# Patient Record
Sex: MALE | Race: WHITE | Employment: OTHER | URBAN - METROPOLITAN AREA
[De-identification: names, ages, dates, MRNs, and addresses within clinical notes are randomized per-mention and may not be internally consistent; named-entity substitution may affect disease eponyms.]

---

## 2017-02-15 ENCOUNTER — ANESTHESIA EVENT (OUTPATIENT)
Dept: ENDOSCOPY | Age: 67
End: 2017-02-15
Payer: COMMERCIAL

## 2017-02-15 RX ORDER — SODIUM CHLORIDE, SODIUM LACTATE, POTASSIUM CHLORIDE, CALCIUM CHLORIDE 600; 310; 30; 20 MG/100ML; MG/100ML; MG/100ML; MG/100ML
100 INJECTION, SOLUTION INTRAVENOUS CONTINUOUS
Status: CANCELLED | OUTPATIENT
Start: 2017-02-15

## 2017-02-15 RX ORDER — SODIUM CHLORIDE 0.9 % (FLUSH) 0.9 %
5-10 SYRINGE (ML) INJECTION AS NEEDED
Status: CANCELLED | OUTPATIENT
Start: 2017-02-15

## 2017-02-16 ENCOUNTER — HOSPITAL ENCOUNTER (OUTPATIENT)
Age: 67
Setting detail: OUTPATIENT SURGERY
Discharge: HOME | End: 2017-02-16
Attending: INTERNAL MEDICINE | Admitting: INTERNAL MEDICINE
Payer: COMMERCIAL

## 2017-02-16 ENCOUNTER — ANESTHESIA (OUTPATIENT)
Dept: ENDOSCOPY | Age: 67
End: 2017-02-16
Payer: COMMERCIAL

## 2017-02-16 ENCOUNTER — SURGERY (OUTPATIENT)
Age: 67
End: 2017-02-16

## 2017-02-16 VITALS
HEART RATE: 68 BPM | WEIGHT: 155 LBS | HEIGHT: 67 IN | SYSTOLIC BLOOD PRESSURE: 140 MMHG | OXYGEN SATURATION: 96 % | RESPIRATION RATE: 14 BRPM | DIASTOLIC BLOOD PRESSURE: 82 MMHG | TEMPERATURE: 97.7 F | BODY MASS INDEX: 24.33 KG/M2

## 2017-02-16 LAB — GLUCOSE BLD STRIP.AUTO-MCNC: 190 MG/DL (ref 65–100)

## 2017-02-16 PROCEDURE — 74011250636 HC RX REV CODE- 250/636: Performed by: ANESTHESIOLOGY

## 2017-02-16 PROCEDURE — 74011000250 HC RX REV CODE- 250: Performed by: ANESTHESIOLOGY

## 2017-02-16 PROCEDURE — 77030014243 HC BND LIG VRCES BSC -D: Performed by: INTERNAL MEDICINE

## 2017-02-16 PROCEDURE — 76040000025: Performed by: INTERNAL MEDICINE

## 2017-02-16 PROCEDURE — 74011000250 HC RX REV CODE- 250

## 2017-02-16 PROCEDURE — 76060000031 HC ANESTHESIA FIRST 0.5 HR: Performed by: INTERNAL MEDICINE

## 2017-02-16 PROCEDURE — 74011250636 HC RX REV CODE- 250/636: Performed by: INTERNAL MEDICINE

## 2017-02-16 PROCEDURE — 82962 GLUCOSE BLOOD TEST: CPT

## 2017-02-16 PROCEDURE — 74011250636 HC RX REV CODE- 250/636

## 2017-02-16 RX ORDER — ONDANSETRON 2 MG/ML
4 INJECTION INTRAMUSCULAR; INTRAVENOUS ONCE
Status: COMPLETED | OUTPATIENT
Start: 2017-02-16 | End: 2017-02-16

## 2017-02-16 RX ORDER — HYDROMORPHONE HYDROCHLORIDE 2 MG/ML
0.5 INJECTION, SOLUTION INTRAMUSCULAR; INTRAVENOUS; SUBCUTANEOUS
Status: CANCELLED | OUTPATIENT
Start: 2017-02-16

## 2017-02-16 RX ORDER — LIDOCAINE HYDROCHLORIDE 20 MG/ML
INJECTION, SOLUTION EPIDURAL; INFILTRATION; INTRACAUDAL; PERINEURAL AS NEEDED
Status: DISCONTINUED | OUTPATIENT
Start: 2017-02-16 | End: 2017-02-16 | Stop reason: HOSPADM

## 2017-02-16 RX ORDER — PROPOFOL 10 MG/ML
INJECTION, EMULSION INTRAVENOUS AS NEEDED
Status: DISCONTINUED | OUTPATIENT
Start: 2017-02-16 | End: 2017-02-16 | Stop reason: HOSPADM

## 2017-02-16 RX ORDER — SODIUM CHLORIDE, SODIUM LACTATE, POTASSIUM CHLORIDE, CALCIUM CHLORIDE 600; 310; 30; 20 MG/100ML; MG/100ML; MG/100ML; MG/100ML
100 INJECTION, SOLUTION INTRAVENOUS CONTINUOUS
Status: DISCONTINUED | OUTPATIENT
Start: 2017-02-16 | End: 2017-02-16 | Stop reason: HOSPADM

## 2017-02-16 RX ORDER — PROPOFOL 10 MG/ML
INJECTION, EMULSION INTRAVENOUS
Status: DISCONTINUED | OUTPATIENT
Start: 2017-02-16 | End: 2017-02-16 | Stop reason: HOSPADM

## 2017-02-16 RX ADMIN — PROPOFOL 200 MCG/KG/MIN: 10 INJECTION, EMULSION INTRAVENOUS at 08:26

## 2017-02-16 RX ADMIN — SODIUM CHLORIDE 6.25 MG: 9 INJECTION INTRAMUSCULAR; INTRAVENOUS; SUBCUTANEOUS at 08:09

## 2017-02-16 RX ADMIN — PROPOFOL 70 MG: 10 INJECTION, EMULSION INTRAVENOUS at 08:26

## 2017-02-16 RX ADMIN — ONDANSETRON 4 MG: 2 INJECTION INTRAMUSCULAR; INTRAVENOUS at 08:54

## 2017-02-16 RX ADMIN — SODIUM CHLORIDE, SODIUM LACTATE, POTASSIUM CHLORIDE, AND CALCIUM CHLORIDE 100 ML/HR: 600; 310; 30; 20 INJECTION, SOLUTION INTRAVENOUS at 07:50

## 2017-02-16 RX ADMIN — LIDOCAINE HYDROCHLORIDE 40 MG: 20 INJECTION, SOLUTION EPIDURAL; INFILTRATION; INTRACAUDAL; PERINEURAL at 08:26

## 2017-02-16 NOTE — IP AVS SNAPSHOT
Malini Kong 
 
 
 [address] [facility] [address] [facility] 
[phone] Patient: Ji Adler 
MRN: [id number] You are allergic to the following Allergen Reactions Metformin Diarrhea Recent Documentation Height Weight BMI Smoking Status 1.702 m 70.3 kg 24.28 kg/m2 Former Smoker Emergency Contacts Name Discharge Info Relation Home Work Mobile [address] CAREGIVER [3] Spouse [3] [phone] About your hospitalization You were admitted on:  February 16, 2017 You last received care in the:  [facility] You were discharged on:  February 16, 2017 Unit phone number:  [phone] Why you were hospitalized Your primary diagnosis was:  Not on File Providers Seen During Your Hospitalizations Provider Role Specialty Primary office phone Sandra Saucedo MD Attending Provider Gastroenterology [phone] Your Primary Care Physician (PCP) Primary Care Physician Office Phone Office Fax Shannon Mosley [phone] Follow-up Information None Your Appointments Tuesday March 28, 2017  8:20 AM EDT  
SAME DAY with Indigo Saavedra MD  
[address] [facility] [address] [place] [id number]  
[phone] Current Discharge Medication List  
  
ASK your doctor about these medications Dose & Instructions Dispensing Information Comments Morning Noon Evening Bedtime  
 aspirin delayed-release 81 mg tablet Your next dose is: Today, Tomorrow Other:  _________ Dose:  81 mg Take 81 mg by mouth nightly. Refills:  0  
     
   
   
   
  
 atorvastatin 80 mg tablet Commonly known as:  LIPITOR Your next dose is: Today, Tomorrow Other:  _________  Dose:  40 mg  
 Take 40 mg by mouth nightly. Indications: HYPERCHOLESTEROLEMIA Refills:  0  
     
   
   
   
  
 ferrous sulfate 325 mg (65 mg iron) EC tablet Commonly known as:  IRON Your next dose is: Today, Tomorrow Other:  _________ Dose:  325 mg Take 1 Tab by mouth two (2) times daily (with meals). Quantity:  60 Tab Refills:  5  
     
   
   
   
  
 isosorbide mononitrate ER 30 mg tablet Commonly known as:  IMDUR Your next dose is: Today, Tomorrow Other:  _________ Dose:  30 mg Take 30 mg by mouth every morning. Refills:  0  
     
   
   
   
  
 LANTUS 100 unit/mL injection Generic drug:  insulin glargine Your next dose is: Today, Tomorrow Other:  _________ Dose:  25 Units 25 Units by SubCUTAneous route nightly. Refills:  0  
     
   
   
   
  
 metoprolol tartrate 25 mg tablet Commonly known as:  LOPRESSOR Your next dose is: Today, Tomorrow Other:  _________ Dose:  25 mg Take 25 mg by mouth two (2) times a day. Indications: HYPERTENSION Refills:  0 NovoLOG Flexpen 100 unit/mL Inpn Generic drug:  insulin aspart Your next dose is: Today, Tomorrow Other:  _________ Dose:  10 Units 10 Units by SubCUTAneous route Daily (before lunch). SS- If 150-199= 1 unit 200-249= 3 units 250-300= 5 units  Indications: TYPE 2 DIABETES MELLITUS, At dinner 10 units Refills:  0 Omeprazole delayed release 20 mg tablet Commonly known as:  PRILOSEC D/R Your next dose is: Today, Tomorrow Other:  _________ Dose:  20 mg Take 20 mg by mouth two (2) times a day. Indications: GASTROESOPHAGEAL REFLUX Refills:  0  
     
   
   
   
  
 ondansetron 4 mg disintegrating tablet Commonly known as:  ZOFRAN ODT Your next dose is: Today, Tomorrow Other:  _________ Dose:  4 mg Take 1 Tab by mouth every eight (8) hours as needed for Nausea. Quantity:  20 Tab Refills:  0  
     
   
   
   
  
 tamsulosin 0.4 mg capsule Commonly known as:  FLOMAX Your next dose is: Today, Tomorrow Other:  _________ Dose:  0.4 mg Take 0.4 mg by mouth every morning. Refills:  0  
     
   
   
   
  
 triamcinolone acetonide 0.1 % ointment Commonly known as:  KENALOG Your next dose is: Today, Tomorrow Other:  _________ Apply  to affected area two (2) times a day. use thin layer Quantity:  30 g Refills:  0 Discharge Instructions Gastrointestinal Esophagogastroduodenoscopy (EGD) - Upper Exam Discharge Instructions 1. Call Dr. Alfonso Nunez for any problems or questions. 2. Contact the doctor's office for follow up appointment as directed. 3. Medication may cause drowsiness for several hours, therefore, do not drive or operate machinery for remainder of the day. 4. No alcohol today. 5. Ordinarily, you may resume regular diet and activity after exam unless otherwise specified by your physician. 6. For mild soreness in your throat you may use Cepacol throat lozenges or warm salt-water gargles as needed. Any additional instructions:  Liquids today; repeat EGD with banding in 6 weeks at hospital 
  
Instructions given to Leonidas and other family members. Instructions given by:  Koko Powell RN Discharge Orders None Introducing [facility]! Dear Brayden Selby: Thank you for requesting a [employer] account. Our records indicate that you already have an active [employer] account. You can access your account anytime at [email or web address] Did you know that you can access your hospital and ER discharge instructions at any time in [employer]? You can also review all of your test results from your hospital stay or ER visit. Additional Information If you have questions, please visit the Frequently Asked Questions section of the [employer]hart website at [email or web address] Remember, MyChart is NOT to be used for urgent needs. For medical emergencies, dial 911. Now available from your iPhone and Android! General Information Please provide this summary of care documentation to your next provider. Patient Signature:  ____________________________________________________________ Date:  ____________________________________________________________  
  
[facility] Provider Signature:  ____________________________________________________________ Date:  ____________________________________________________________

## 2017-02-16 NOTE — ROUTINE PROCESS
Patient discharged via wheelchair. VSS on room air. No complaints of pain/discomfort. Tolerating PO fluids. Spoke with Dr. Anam Bowden at bedside. Discharge instructions given to responsible party, signed copy placed in chart. Escorted to transportation by [employer].

## 2017-02-16 NOTE — H&P
History and Physical      Patient: Joey Lopez II    Physician: Zayra Richardson MD    Referring Physician: No ref. provider found    Chief Complaint: For EGD    History of Present Illness: Pt presents for EGD with possible banding- hx of varices from cirrhosis.      History:  Past Medical History   Diagnosis Date    Adverse effect of anesthesia      delayed awakening-pt denies    Biliary cirrhosis ([address] 8/8/2016    BPH (benign prostatic hyperplasia)     CAD (coronary artery disease) 6/6/16     MI--stent x 1- 1999---- followed by  [facility]--pt is SOB presently due to HGB 7.9; no cath or exac of CAD since 1999    Chronic back pain     Cirrhosis ([address] Dx 7/26/16 6/22/16 CBC and CMP--denies use of alcohol- banding of esophogeal varicies 5/2016- Dr Lesli Woods    Diabetes mellitus type 2, controlled ([address] dx 2007     type2-- avg 200 -- insulin dep since 2014- ([facility] \"They want me to check at least once a week\")- last A1C= 7.4 (6/22/16)-- no hypo    Esophageal varices ([address] 6/6/16     exac 5/23/16- disch 5/26/16- HGB 7.6 on 6/1/16    GERD (gastroesophageal reflux disease)      controlled with med    Hypercholesterolemia     Hyperlipidemia     Hypertension      controlled with med    Iron deficiency anemia      Esophogeal banding    Malignant hyperthermia due to anesthesia      1st cousin- on mom's side--- 30 yrs ago--per pt-- no one was tested in his family-REPORT TO DR Beth Lozano ORDERS    KAREN on CPAP      CPAP used    Varices, esophageal ([address]      banded 5/2016 Dr Lesli Woods     Past Surgical History   Procedure Laterality Date    Hx other surgical Left      gangrene left leg-- [facility]    Pr cardiac surg procedure unlist       stent x1    Hx lap cholecystectomy  ~2010    Hx cervical fusion       ACDF multilevel    Hx lumbar laminectomy      Pr breast surgery procedure unlisted  age 14     muscles removed in chest age 14     Hx shoulder arthroscopy Left      left shoulder    Hx orthopaedic       right ankle screw    Hx heent       esophageal banding    Hx tonsillectomy  as child    Hx heent Left      leg-gangrene in [place]-  I&D      Social History     Social History    Marital status:      Spouse name: N/A    Number of children: N/A    Years of education: N/A     Social History Main Topics    Smoking status: Former Smoker     Packs/day: 1.00     Years: 40.00     Quit date: 11/25/1997    Smokeless tobacco: Never Used    Alcohol use No      Comment: very rarely - 3x per year/ no hx heavy drinking    Drug use: No    Sexual activity: Not Asked     Other Topics Concern    None     Social History Narrative    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 Has 2 sons, 7 grandchildren      Family History   Problem Relation Age of Onset    Other Mother      liver disease secondary to hepatitis- from transfusion    Diabetes Mother     Liver Disease Mother     Heart Disease Father      pace maker    Diabetes Sister     Stroke Sister     No Known Problems Sister     No Known Problems Brother     No Known Problems Brother     No Known Problems Sister     Coronary Artery Disease Other      fam hx       Medications:   Prior to Admission medications    Medication Sig Start Date End Date Taking? Authorizing Provider   ferrous sulfate (IRON) 325 mg (65 mg iron) EC tablet Take 1 Tab by mouth two (2) times daily (with meals). Patient taking differently: Take 325 mg by mouth two (2) times daily (with meals). Stopped 2/7/17 10/31/16  Yes Kiet Vasquez MD   insulin glargine (LANTUS) 100 unit/mL injection 25 Units by SubCUTAneous route nightly. Yes Historical Provider   isosorbide mononitrate ER (IMDUR) 30 mg tablet Take 30 mg by mouth every morning. Yes Historical Provider   metoprolol (LOPRESSOR) 25 mg tablet Take 25 mg by mouth two (2) times a day.  Indications: HYPERTENSION   Yes Historical Provider insulin aspart (NOVOLOG FLEXPEN) 100 unit/mL flexpen 10 Units by SubCUTAneous route Daily (before lunch). SS-  If 150-199= 1 unit  200-249= 3 units  250-300= 5 units  Indications: TYPE 2 DIABETES MELLITUS, At dinner 10 units   Yes Historical Provider   atorvastatin (LIPITOR) 80 mg tablet Take 40 mg by mouth nightly. Indications: HYPERCHOLESTEROLEMIA   Yes Historical Provider   Omeprazole delayed release (PRILOSEC D/R) 20 mg tablet Take 20 mg by mouth two (2) times a day. Indications: GASTROESOPHAGEAL REFLUX   Yes Historical Provider   aspirin delayed-release 81 mg tablet Take 81 mg by mouth nightly. Yes Historical Provider   tamsulosin (FLOMAX) 0.4 mg capsule Take 0.4 mg by mouth every morning. Yes Historical Provider   triamcinolone acetonide (KENALOG) 0.1 % ointment Apply  to affected area two (2) times a day. use thin layer  Patient taking differently: Apply  to affected area two (2) times a day. use thin  PRN 11/8/16   Keara Burnette MD   ondansetron (ZOFRAN ODT) 4 mg disintegrating tablet Take 1 Tab by mouth every eight (8) hours as needed for Nausea. 10/12/16   Nicci Gunter MD       Allergies: Allergies   Allergen Reactions    Metformin Diarrhea       Physical Exam:     Vital Signs:   Visit Vitals    /69    Pulse 75    Temp 98.1 °F (36.7 °C)    Resp 14    Ht 5' 7\" (1.702 m)    Wt 70.3 kg (155 lb)    SpO2 96%    BMI 24.28 kg/m2     . General: no distress      Heart: regular   Lungs: unlabored   Abdominal: soft   Neurological: Grossly normal        Findings/Diagnosis: Esophageal varices    Plan of Care/Planned Procedure: EGD with banding. Pt/designee agree to proceed.       Signed:  Alcira Little MD   2/16/2017

## 2017-02-16 NOTE — ANESTHESIA PREPROCEDURE EVALUATION
Anesthetic History     Malignant hyperthermia (cousin on maternal side)          Review of Systems / Medical History  Patient summary reviewed, nursing notes reviewed and pertinent labs reviewed    Pulmonary        Sleep apnea: CPAP           Neuro/Psych              Cardiovascular    Hypertension: well controlled          CAD and cardiac stents (1999)         GI/Hepatic/Renal     GERD      Liver disease    Comments: Esophageal varicies Endo/Other    Diabetes: well controlled, type 2    Anemia     Other Findings              Physical Exam    Airway  Mallampati: II      Mouth opening: Normal     Cardiovascular  Regular rate and rhythm,  S1 and S2 normal,  no murmur, click, rub, or gallop  Rhythm: regular  Rate: normal         Dental    Dentition: Full upper dentures     Pulmonary  Breath sounds clear to auscultation               Abdominal         Other Findings            Anesthetic Plan    ASA: 3  Anesthesia type: total IV anesthesia          Induction: Intravenous  Anesthetic plan and risks discussed with: Patient

## 2017-02-16 NOTE — PROCEDURES
DATE OF PROCEDURE: 2/16/17    REQUESTING PHYSICIAN: Dr Gillian Briceño, Dr Melissa Manuel: Esophagogastroduodenoscopy. ENDOSCOPIST: Sundar Ribeiro M.D. PREOPERATIVE DIAGNOSIS: Esophageal varices, Cirrhosis    POSTOPERATIVE DIAGNOSIS: Esophageal varices, Portal Hypertensive Gastropathy, GAVE    INSTRUMENTS: GIF H190    SEDATION: MAC    DESCRIPTION: After informed consent was obtained, the patient was taken to the endoscopy suite and placed in the left lateral decubitus position. Intravenous sedation was administered as above and posterior pharynx was anesthetized with local anesthetic spray. After adequate sedation had been achieved the endoscope was inserted over the tongue and through the posterior pharynx under direct visualization down the esophagus to the stomach and into the second portion of the duodenum. The endoscopic was withdrawn from that point, performing a careful survey of the mucosa. Retroflexion was performed in the gastric fundus. FINDINGS:  Esophagus: 2 columns of grade II/III distal varices, one with small red nely sign. 4 bands were placed successfully. Stomach: Portal hypertensive gastropathy. GAVE noted. Several areas of mildly oozing inflammatory lesions. Duodenum: Normal duodenal mucosa.      Estimated blood loss:  0 cc-minimal    IMPRESSION:   Esophageal varices  GAVE  Portal Hypertensive Gastropathy    PLAN:  - Liquids today  - EGD with banding in 6wks    P Emely Thorpe MD

## 2017-02-16 NOTE — PROGRESS NOTES
Pt complains of nausea. Dr. Helena Gleason called; 6.25 mg phenergan ordered for patient. 6.25 mg iv phenergan given.

## 2017-02-16 NOTE — ANESTHESIA POSTPROCEDURE EVALUATION
Post-Anesthesia Evaluation and Assessment    Patient: Bhumika Polanco MRN: [id number]  SSN: [id number]    YOB: 1950  Age: 78 y.o. Sex: male       Cardiovascular Function/Vital Signs  Visit Vitals    /82    Pulse 68    Temp 36.5 °C (97.7 °F)    Resp 14    Ht 5' 7\" (1.702 m)    Wt 70.3 kg (155 lb)    SpO2 96%    BMI 24.28 kg/m2       Patient is status post total IV anesthesia anesthesia for Procedure(s):  ESOPHAGOGASTRODUODENOSCOPY (EGD) 26  ENDOSCOPIC BANDING OR LIGATION. Nausea/Vomiting: None    Postoperative hydration reviewed and adequate. Pain:  Pain Scale 1: Numeric (0 - 10) (02/16/17 0907)  Pain Intensity 1: 0 (02/16/17 8904)   Managed    Neurological Status: At baseline    Mental Status and Level of Consciousness: Arousable    Pulmonary Status:   O2 Device: Room air (02/16/17 0907)   Adequate oxygenation and airway patent    Complications related to anesthesia: None    Post-anesthesia assessment completed.  No concerns    Signed By: Dianne Healy MD     February 16, 2017

## 2017-02-16 NOTE — DISCHARGE INSTRUCTIONS
Gastrointestinal Esophagogastroduodenoscopy (EGD) - Upper Exam Discharge Instructions    1. Call Dr. Netta Traylor for any problems or questions. 2. Contact the doctor's office for follow up appointment as directed. 3. Medication may cause drowsiness for several hours, therefore, do not drive or operate machinery for remainder of the day. 4. No alcohol today. 5. Ordinarily, you may resume regular diet and activity after exam unless otherwise specified by your physician. 6. For mild soreness in your throat you may use Cepacol throat lozenges or warm salt-water gargles as needed. Any additional instructions:  Liquids today; repeat EGD with banding in 6 weeks at hospital     Instructions given to Leonidas and other family members.   Instructions given by:  Cynthia San RN

## 2017-02-20 ENCOUNTER — HOSPITAL ENCOUNTER (EMERGENCY)
Age: 67
Discharge: HOME | End: 2017-02-20
Attending: EMERGENCY MEDICINE
Payer: COMMERCIAL

## 2017-02-20 VITALS
RESPIRATION RATE: 16 BRPM | TEMPERATURE: 98 F | BODY MASS INDEX: 24.91 KG/M2 | SYSTOLIC BLOOD PRESSURE: 103 MMHG | WEIGHT: 155 LBS | HEIGHT: 66 IN | HEART RATE: 62 BPM | DIASTOLIC BLOOD PRESSURE: 65 MMHG | OXYGEN SATURATION: 94 %

## 2017-02-20 DIAGNOSIS — K92.1 BLACK TARRY STOOLS: Primary | ICD-10-CM

## 2017-02-20 LAB
ALBUMIN SERPL BCP-MCNC: 3.6 G/DL (ref 3.2–4.6)
ALBUMIN/GLOB SERPL: 0.9 {RATIO} (ref 1.2–3.5)
ALP SERPL-CCNC: 79 U/L (ref 50–136)
ALT SERPL-CCNC: 33 U/L (ref 12–65)
ANION GAP BLD CALC-SCNC: 9 MMOL/L (ref 7–16)
AST SERPL W P-5'-P-CCNC: 32 U/L (ref 15–37)
BASOPHILS # BLD AUTO: 0 K/UL (ref 0–0.2)
BASOPHILS # BLD: 1 % (ref 0–2)
BILIRUB SERPL-MCNC: 0.5 MG/DL (ref 0.2–1.1)
BUN SERPL-MCNC: 9 MG/DL (ref 8–23)
CALCIUM SERPL-MCNC: 8.9 MG/DL (ref 8.3–10.4)
CHLORIDE SERPL-SCNC: 101 MMOL/L (ref 98–107)
CO2 SERPL-SCNC: 28 MMOL/L (ref 21–32)
CREAT SERPL-MCNC: 0.83 MG/DL (ref 0.8–1.5)
DIFFERENTIAL METHOD BLD: ABNORMAL
EOSINOPHIL # BLD: 0.1 K/UL (ref 0–0.8)
EOSINOPHIL NFR BLD: 1 % (ref 0.5–7.8)
ERYTHROCYTE [DISTWIDTH] IN BLOOD BY AUTOMATED COUNT: 14.3 % (ref 11.9–14.6)
GLOBULIN SER CALC-MCNC: 4 G/DL (ref 2.3–3.5)
GLUCOSE SERPL-MCNC: 186 MG/DL (ref 65–100)
HCT VFR BLD AUTO: 42.6 % (ref 41.1–50.3)
HGB BLD-MCNC: 14 G/DL (ref 13.6–17.2)
IMM GRANULOCYTES # BLD: 0 K/UL (ref 0–0.5)
IMM GRANULOCYTES NFR BLD AUTO: 0.2 % (ref 0–5)
INR PPP: 1.1 (ref 0.9–1.2)
LIPASE SERPL-CCNC: 171 U/L (ref 73–393)
LYMPHOCYTES # BLD AUTO: 13 % (ref 13–44)
LYMPHOCYTES # BLD: 0.7 K/UL (ref 0.5–4.6)
MCH RBC QN AUTO: 29.5 PG (ref 26.1–32.9)
MCHC RBC AUTO-ENTMCNC: 32.9 G/DL (ref 31.4–35)
MCV RBC AUTO: 89.9 FL (ref 79.6–97.8)
MONOCYTES # BLD: 0.7 K/UL (ref 0.1–1.3)
MONOCYTES NFR BLD AUTO: 13 % (ref 4–12)
NEUTS SEG # BLD: 3.6 K/UL (ref 1.7–8.2)
NEUTS SEG NFR BLD AUTO: 72 % (ref 43–78)
PLATELET # BLD AUTO: 99 K/UL (ref 150–450)
PMV BLD AUTO: 11.8 FL (ref 10.8–14.1)
POTASSIUM SERPL-SCNC: 4.2 MMOL/L (ref 3.5–5.1)
PROT SERPL-MCNC: 7.6 G/DL (ref 6.3–8.2)
PROTHROMBIN TIME: 12.3 SEC (ref 9.6–12)
RBC # BLD AUTO: 4.74 M/UL (ref 4.23–5.67)
SODIUM SERPL-SCNC: 138 MMOL/L (ref 136–145)
WBC # BLD AUTO: 5 K/UL (ref 4.3–11.1)

## 2017-02-20 PROCEDURE — 96374 THER/PROPH/DIAG INJ IV PUSH: CPT | Performed by: EMERGENCY MEDICINE

## 2017-02-20 PROCEDURE — 74011250636 HC RX REV CODE- 250/636: Performed by: EMERGENCY MEDICINE

## 2017-02-20 PROCEDURE — 85610 PROTHROMBIN TIME: CPT | Performed by: EMERGENCY MEDICINE

## 2017-02-20 PROCEDURE — 85025 COMPLETE CBC W/AUTO DIFF WBC: CPT | Performed by: EMERGENCY MEDICINE

## 2017-02-20 PROCEDURE — 80053 COMPREHEN METABOLIC PANEL: CPT | Performed by: EMERGENCY MEDICINE

## 2017-02-20 PROCEDURE — 83690 ASSAY OF LIPASE: CPT | Performed by: EMERGENCY MEDICINE

## 2017-02-20 PROCEDURE — 99283 EMERGENCY DEPT VISIT LOW MDM: CPT | Performed by: EMERGENCY MEDICINE

## 2017-02-20 PROCEDURE — 96375 TX/PRO/DX INJ NEW DRUG ADDON: CPT | Performed by: EMERGENCY MEDICINE

## 2017-02-20 PROCEDURE — 74011000250 HC RX REV CODE- 250: Performed by: EMERGENCY MEDICINE

## 2017-02-20 RX ORDER — ONDANSETRON 2 MG/ML
4 INJECTION INTRAMUSCULAR; INTRAVENOUS
Status: COMPLETED | OUTPATIENT
Start: 2017-02-20 | End: 2017-02-20

## 2017-02-20 RX ORDER — FAMOTIDINE 10 MG/ML
20 INJECTION INTRAVENOUS
Status: COMPLETED | OUTPATIENT
Start: 2017-02-20 | End: 2017-02-20

## 2017-02-20 RX ORDER — SODIUM CHLORIDE 0.9 % (FLUSH) 0.9 %
5-10 SYRINGE (ML) INJECTION EVERY 8 HOURS
Status: DISCONTINUED | OUTPATIENT
Start: 2017-02-20 | End: 2017-02-20 | Stop reason: HOSPADM

## 2017-02-20 RX ORDER — SODIUM CHLORIDE 0.9 % (FLUSH) 0.9 %
5-10 SYRINGE (ML) INJECTION AS NEEDED
Status: DISCONTINUED | OUTPATIENT
Start: 2017-02-20 | End: 2017-02-20 | Stop reason: HOSPADM

## 2017-02-20 RX ADMIN — ONDANSETRON 4 MG: 2 INJECTION INTRAMUSCULAR; INTRAVENOUS at 13:42

## 2017-02-20 RX ADMIN — FAMOTIDINE 20 MG: 10 INJECTION, SOLUTION INTRAVENOUS at 13:42

## 2017-02-20 NOTE — ED NOTES
Pt states he had banding done last Thursday and is experiencing blood in the stool. Complains of nausea but no vomiting.

## 2017-02-20 NOTE — ED NOTES
I have reviewed discharge instructions with the patient. The patient verbalized understanding. Pt denies any further needs, questions, or concerns at this time. No adverse reactions to any medications, treatments, or procedures noted. Pt ambulatory with steady gait out of department.

## 2017-02-20 NOTE — ED PROVIDER NOTES
HPI Comments: Patient had an esophageal varices banding procedure done 4 days ago, complains of intermittent nausea and black tarry stools this morning. Patient describes slight burning in his epigastric region, denies any change in medications. Patient is a 78 y.o. male presenting with melena. The history is provided by the patient and the spouse. Melena    The current episode started today. The problem occurs rarely. The problem has been unchanged. The pain is mild. The stool is described as soft. There was no prior successful therapy. There was no prior unsuccessful therapy. Associated symptoms include abdominal pain (mild epigastric burning), hemorrhoids and nausea. Pertinent negatives include no anorexia, no fever, no diarrhea, no hematemesis, no rectal pain, no vomiting, no hematuria, no chest pain, no headaches, no coughing, no difficulty breathing and no rash. There were no sick contacts.         Past Medical History:   Diagnosis Date    Adverse effect of anesthesia      delayed awakening-pt denies    Biliary cirrhosis ([facility] [address] 8/8/2016    BPH (benign prostatic hyperplasia)     CAD (coronary artery disease) 6/6/16     MI--stent x 1- 1999---- [facility] by  [facility]--pt is SOB presently due to HGB 7.9; no cath or exac of CAD since 1999    Chronic back pain     Cirrhosis ([facility] [address] Dx 7/26/16 6/22/16 CBC and CMP--denies use of alcohol- banding of esophogeal varicies 5/2016- Dr Augustus Golden    Diabetes mellitus type 2, controlled ([facility] [address] dx 2007     type2-- avg 200 -- insulin dep since 2014- ([facility] \"They want me to check at least once a week\")- last A1C= 7.4 (6/22/16)-- no hypo    Esophageal varices ([address] 6/6/16     exac 5/23/16- disch 5/26/16- HGB 7.6 on 6/1/16    GERD (gastroesophageal reflux disease)      controlled with med    Hypercholesterolemia     Hyperlipidemia     Hypertension      controlled with med    Iron deficiency anemia      Esophogeal banding    Malignant hyperthermia due to anesthesia      1st cousin- on mom's side--- 30 yrs ago--per pt-- no one was tested in his family-REPORT TO DR Serafin Palencia ORDERS    KAREN on CPAP      CPAP used    Varices, esophageal ([address]      banded 5/2016 Dr Andrea Rogel       Past Surgical History:   Procedure Laterality Date    Hx other surgical Left      gangrene left leg-- [facility] in [facility]    Pr cardiac surg procedure unlist       stent x1    Hx lap cholecystectomy  ~2010    Hx cervical fusion       ACDF multilevel    Hx lumbar laminectomy      Pr breast surgery procedure unlisted  age 14     muscles removed in chest age 14     Hx shoulder arthroscopy Left      left shoulder    Hx orthopaedic       right ankle screw    Hx heent       esophageal banding    Hx tonsillectomy  as child    Hx heent Left      leg-gangrene in [place]-  I&D         Family History:   Problem Relation Age of Onset    Other Mother      liver disease secondary to hepatitis- from transfusion    Diabetes Mother     Liver Disease Mother     Heart Disease Father      pace maker    Diabetes Sister     Stroke Sister     No Known Problems Sister     No Known Problems Brother     No Known Problems Brother     No Known Problems Sister     Coronary Artery Disease Other      fam hx       Social History     Social History    Marital status:      Spouse name: N/A    Number of children: N/A    Years of education: N/A     Occupational History    Not on file. Social History Main Topics    Smoking status: Former Smoker     Packs/day: 1.00     Years: 40.00     Quit date: 11/25/1997    Smokeless tobacco: Never Used    Alcohol use No      Comment: very rarely - 3x per year/ no hx heavy drinking    Drug use: No    Sexual activity: Not on file     Other Topics Concern    Not on file     Social History Narrative    Retired from working in [employer] , 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  Has 2 sons, 7 grandchildren         ALLERGIES: Metformin    Review of Systems   Constitutional: Negative for activity change, appetite change, chills and fever. Respiratory: Negative for cough. Cardiovascular: Negative for chest pain. Gastrointestinal: Positive for abdominal pain (mild epigastric burning), blood in stool, hemorrhoids, melena and nausea. Negative for anorexia, constipation, diarrhea, hematemesis, rectal pain and vomiting. Genitourinary: Negative for hematuria. Skin: Negative for rash. Neurological: Negative for headaches. All other systems reviewed and are negative. Vitals:    02/20/17 1042   BP: 118/73   Pulse: 70   Resp: 16   Temp: 98.3 °F (36.8 °C)   SpO2: 96%   Weight: 70.3 kg (155 lb)   Height: 5' 6\" (1.676 m)            Physical Exam   Constitutional: He is oriented to person, place, and time. He appears well-developed and well-nourished. No distress. HENT:   Head: Normocephalic and atraumatic. Right Ear: Tympanic membrane and external ear normal.   Left Ear: Tympanic membrane and external ear normal.   Mouth/Throat: Oropharynx is clear and moist.   Eyes: Conjunctivae and EOM are normal. Pupils are equal, round, and reactive to light. Neck: Normal range of motion. Neck supple. No tracheal deviation present. Cardiovascular: Normal rate, regular rhythm, normal heart sounds and intact distal pulses. Exam reveals no gallop and no friction rub. No murmur heard. Pulmonary/Chest: Effort normal and breath sounds normal. No respiratory distress. He has no wheezes. Abdominal: Soft. Bowel sounds are normal. He exhibits no shifting dullness, no distension, no pulsatile liver, no fluid wave, no abdominal bruit, no pulsatile midline mass and no mass. There is no hepatosplenomegaly. There is tenderness (mild) in the epigastric area. There is no rigidity, no rebound, no guarding, no CVA tenderness, no tenderness at McBurney's point and negative Fritz's sign. No hernia. Musculoskeletal: Normal range of motion.  He exhibits no edema. Lymphadenopathy:     He has no cervical adenopathy. Neurological: He is alert and oriented to person, place, and time. He has normal strength. He displays normal reflexes. No cranial nerve deficit or sensory deficit. Coordination normal.   Skin: Skin is warm and dry. No rash noted. He is not diaphoretic. No erythema. Psychiatric: He has a normal mood and affect. His speech is normal and behavior is normal. Cognition and memory are normal.   Nursing note and vitals reviewed. MDM  Number of Diagnoses or Management Options  Black tarry stools: new and requires workup     Amount and/or Complexity of Data Reviewed  Clinical lab tests: ordered and reviewed  Decide to obtain previous medical records or to obtain history from someone other than the patient: yes  Review and summarize past medical records: yes    Risk of Complications, Morbidity, and/or Mortality  Presenting problems: high  Diagnostic procedures: low  Management options: moderate    Patient Progress  Patient progress: improved    ED Course       Procedures    The patient was observed in the ED. Normal BUN/creatinine and patient restarted iron supplements 3 days ago. Results Reviewed:      Recent Results (from the past 24 hour(s))   CBC WITH AUTOMATED DIFF    Collection Time: 02/20/17 10:45 AM   Result Value Ref Range    WBC 5.0 4.3 - 11.1 K/uL    RBC 4.74 4.23 - 5.67 M/uL    HGB 14.0 13.6 - 17.2 g/dL    HCT 42.6 41.1 - 50.3 %    MCV 89.9 79.6 - 97.8 FL    MCH 29.5 26.1 - 32.9 PG    MCHC 32.9 31.4 - 35.0 g/dL    RDW 14.3 11.9 - 14.6 %    PLATELET 99 (L) 848 - 450 K/uL    MPV 11.8 10.8 - 14.1 FL    DF AUTOMATED      NEUTROPHILS 72 43 - 78 %    LYMPHOCYTES 13 13 - 44 %    MONOCYTES 13 (H) 4.0 - 12.0 %    EOSINOPHILS 1 0.5 - 7.8 %    BASOPHILS 1 0.0 - 2.0 %    IMMATURE GRANULOCYTES 0.2 0.0 - 5.0 %    ABS. NEUTROPHILS 3.6 1.7 - 8.2 K/UL    ABS. LYMPHOCYTES 0.7 0.5 - 4.6 K/UL    ABS. MONOCYTES 0.7 0.1 - 1.3 K/UL    ABS. EOSINOPHILS 0.1 0.0 - 0.8 K/UL    ABS. BASOPHILS 0.0 0.0 - 0.2 K/UL    ABS. IMM. GRANS. 0.0 0.0 - 0.5 K/UL   METABOLIC PANEL, COMPREHENSIVE    Collection Time: 02/20/17 10:45 AM   Result Value Ref Range    Sodium 138 136 - 145 mmol/L    Potassium 4.2 3.5 - 5.1 mmol/L    Chloride 101 98 - 107 mmol/L    CO2 28 21 - 32 mmol/L    Anion gap 9 7 - 16 mmol/L    Glucose 186 (H) 65 - 100 mg/dL    BUN 9 8 - 23 MG/DL    Creatinine 0.83 0.8 - 1.5 MG/DL    GFR est AA >60 >60 ml/min/1.73m2    GFR est non-AA >60 >60 ml/min/1.73m2    Calcium 8.9 8.3 - 10.4 MG/DL    Bilirubin, total 0.5 0.2 - 1.1 MG/DL    ALT (SGPT) 33 12 - 65 U/L    AST (SGOT) 32 15 - 37 U/L    Alk. phosphatase 79 50 - 136 U/L    Protein, total 7.6 6.3 - 8.2 g/dL    Albumin 3.6 3.2 - 4.6 g/dL    Globulin 4.0 (H) 2.3 - 3.5 g/dL    A-G Ratio 0.9 (L) 1.2 - 3.5     LIPASE    Collection Time: 02/20/17 10:45 AM   Result Value Ref Range    Lipase 171 73 - 393 U/L   PROTHROMBIN TIME + INR    Collection Time: 02/20/17  1:10 PM   Result Value Ref Range    Prothrombin time 12.3 (H) 9.6 - 12.0 sec    INR 1.1 0.9 - 1.2         I discussed the results of all labs, procedures, radiographs, and treatments with the patient and available family. Treatment plan is agreed upon and the patient is ready for discharge. All voiced understanding of the discharge plan and medication instructions or changes as appropriate. Questions about treatment in the ED were answered. All were encouraged to return should symptoms worsen or new problems develop.

## 2017-02-20 NOTE — DISCHARGE INSTRUCTIONS
Rectal Bleeding: Care Instructions  Your Care Instructions  Rectal bleeding in small amounts is common. You may see red spotting on toilet paper or drops of blood in the toilet. Rectal bleeding has many possible causes, from something as minor as hemorrhoids to something as serious as colon cancer. You may need more tests to find the cause of your bleeding. Follow-up care is a key part of your treatment and safety. Be sure to make and go to all appointments, and call your doctor if you are having problems. Its also a good idea to know your test results and keep a list of the medicines you take. How can you care for yourself at home? · Avoid aspirin and other nonsteroidal anti-inflammatory drugs (NSAIDs), such as ibuprofen (Advil, Motrin) and naproxen (Aleve). They can cause you to bleed more. Ask your doctor if you can take acetaminophen (Tylenol). Read and follow all instructions on the label. · Use a stool softener that contains bran or psyllium. You can save money by buying bran or psyllium (available in bulk at most health food stores) and sprinkling it on foods or stirring it into fruit juice. You can also use a product such as Metamucil or Citrucel. · Take your medicines exactly as directed. Call your doctor if you think you are having a problem with your medicine. When should you call for help? Call 911 anytime you think you may need emergency care. For example, call if:  · You passed out (lost consciousness). · You pass maroon or very bloody stools. · You vomit blood or what looks like coffee grounds. Call your doctor now or seek immediate medical care if:  · You have severe belly pain. · You have increased bleeding. · You are dizzy or lightheaded, or you feel like you may faint. · Your stools are black and tarlike. Watch closely for changes in your health, and be sure to contact your doctor if:  · You lose weight and do not know why. · You feel tired all the time.   · Your bleeding does not decrease after 2 days. Where can you learn more? Go to http://harinder-filippo.info/. Enter E969 in the search box to learn more about \"Rectal Bleeding: Care Instructions. \"  Current as of: August 9, 2016  Content Version: 11.1  © 0880-2046 [employer]. Care instructions adapted under license by [employer] (which disclaims liability or warranty for this information). If you have questions about a medical condition or this instruction, always ask your healthcare professional. [address] any warranty or liability for your use of this information. Abdominal Pain: Care Instructions  Your Care Instructions    Abdominal pain has many possible causes. Some aren't serious and get better on their own in a few days. Others need more testing and treatment. If your pain continues or gets worse, you need to be rechecked and may need more tests to find out what is wrong. You may need surgery to correct the problem. Don't ignore new symptoms, such as fever, nausea and vomiting, urination problems, pain that gets worse, and dizziness. These may be signs of a more serious problem. Your doctor may have recommended a follow-up visit in the next 8 to 12 hours. If you are not getting better, you may need more tests or treatment. The doctor has checked you carefully, but problems can develop later. If you notice any problems or new symptoms, get medical treatment right away. Follow-up care is a key part of your treatment and safety. Be sure to make and go to all appointments, and call your doctor if you are having problems. It's also a good idea to know your test results and keep a list of the medicines you take. How can you care for yourself at home? · Rest until you feel better. · To prevent dehydration, drink plenty of fluids, enough so that your urine is light yellow or clear like water.  Choose water and other caffeine-free clear liquids until you feel better. If you have kidney, heart, or liver disease and have to limit fluids, talk with your doctor before you increase the amount of fluids you drink. · If your stomach is upset, eat mild foods, such as rice, dry toast or crackers, bananas, and applesauce. Try eating several small meals instead of two or three large ones. · Wait until 48 hours after all symptoms have gone away before you have spicy foods, alcohol, and drinks that contain caffeine. · Do not eat foods that are high in fat. · Avoid anti-inflammatory medicines such as aspirin, ibuprofen (Advil, Motrin), and naproxen (Aleve). These can cause stomach upset. Talk to your doctor if you take daily aspirin for another health problem. When should you call for help? Call 911 anytime you think you may need emergency care. For example, call if:  · You passed out (lost consciousness). · You pass maroon or very bloody stools. · You vomit blood or what looks like coffee grounds. · You have new, severe belly pain. Call your doctor now or seek immediate medical care if:  · Your pain gets worse, especially if it becomes focused in one area of your belly. · You have a new or higher fever. · Your stools are black and look like tar, or they have streaks of blood. · You have unexpected vaginal bleeding. · You have symptoms of a urinary tract infection. These may include:  ¨ Pain when you urinate. ¨ Urinating more often than usual.  ¨ Blood in your urine. · You are dizzy or lightheaded, or you feel like you may faint. Watch closely for changes in your health, and be sure to contact your doctor if:  · You are not getting better after 1 day (24 hours). Where can you learn more? Go to http://harinder-filippo.info/. Enter I600 in the search box to learn more about \"Abdominal Pain: Care Instructions. \"  Current as of: May 27, 2016  Content Version: 11.1  © 1405-8542 [employer].  Care instructions adapted under license by [employer] (which disclaims liability or warranty for this information). If you have questions about a medical condition or this instruction, always ask your healthcare professional. [address] any warranty or liability for your use of this information.

## 2017-02-20 NOTE — ED TRIAGE NOTES
Pt states having a banding for esophageal varices on last Thursday and states that he has been passing some tarry stools the past couple of days.

## 2017-03-28 PROBLEM — I25.83 CORONARY ARTERY DISEASE DUE TO LIPID RICH PLAQUE: Status: ACTIVE | Noted: 2017-03-28

## 2017-03-28 PROBLEM — I25.10 CORONARY ARTERY DISEASE DUE TO LIPID RICH PLAQUE: Status: ACTIVE | Noted: 2017-03-28

## 2017-04-02 ENCOUNTER — ANESTHESIA EVENT (OUTPATIENT)
Dept: ENDOSCOPY | Age: 67
End: 2017-04-02
Payer: COMMERCIAL

## 2017-04-05 ENCOUNTER — SURGERY (OUTPATIENT)
Age: 67
End: 2017-04-05

## 2017-04-05 ENCOUNTER — HOSPITAL ENCOUNTER (OUTPATIENT)
Age: 67
Setting detail: OUTPATIENT SURGERY
Discharge: HOME | End: 2017-04-05
Attending: INTERNAL MEDICINE | Admitting: INTERNAL MEDICINE
Payer: COMMERCIAL

## 2017-04-05 ENCOUNTER — ANESTHESIA (OUTPATIENT)
Dept: ENDOSCOPY | Age: 67
End: 2017-04-05
Payer: COMMERCIAL

## 2017-04-05 VITALS
WEIGHT: 155 LBS | RESPIRATION RATE: 18 BRPM | BODY MASS INDEX: 24.91 KG/M2 | SYSTOLIC BLOOD PRESSURE: 132 MMHG | TEMPERATURE: 97.9 F | OXYGEN SATURATION: 98 % | HEART RATE: 69 BPM | DIASTOLIC BLOOD PRESSURE: 84 MMHG | HEIGHT: 66 IN

## 2017-04-05 LAB — GLUCOSE BLD STRIP.AUTO-MCNC: 154 MG/DL (ref 65–100)

## 2017-04-05 PROCEDURE — 88312 SPECIAL STAINS GROUP 1: CPT | Performed by: INTERNAL MEDICINE

## 2017-04-05 PROCEDURE — 77030029929: Performed by: INTERNAL MEDICINE

## 2017-04-05 PROCEDURE — 74011250636 HC RX REV CODE- 250/636: Performed by: INTERNAL MEDICINE

## 2017-04-05 PROCEDURE — 88305 TISSUE EXAM BY PATHOLOGIST: CPT | Performed by: INTERNAL MEDICINE

## 2017-04-05 PROCEDURE — 74011250636 HC RX REV CODE- 250/636

## 2017-04-05 PROCEDURE — 76040000025: Performed by: INTERNAL MEDICINE

## 2017-04-05 PROCEDURE — 77030011640 HC PAD GRND REM COVD -A: Performed by: INTERNAL MEDICINE

## 2017-04-05 PROCEDURE — 77030013991 HC SNR POLYP ENDOSC BSC -A: Performed by: INTERNAL MEDICINE

## 2017-04-05 PROCEDURE — 82962 GLUCOSE BLOOD TEST: CPT

## 2017-04-05 PROCEDURE — 76060000031 HC ANESTHESIA FIRST 0.5 HR: Performed by: INTERNAL MEDICINE

## 2017-04-05 RX ORDER — SODIUM CHLORIDE, SODIUM LACTATE, POTASSIUM CHLORIDE, CALCIUM CHLORIDE 600; 310; 30; 20 MG/100ML; MG/100ML; MG/100ML; MG/100ML
1000 INJECTION, SOLUTION INTRAVENOUS CONTINUOUS
Status: DISCONTINUED | OUTPATIENT
Start: 2017-04-05 | End: 2017-04-05 | Stop reason: HOSPADM

## 2017-04-05 RX ORDER — EPINEPHRINE 0.1 MG/ML
INJECTION INTRACARDIAC; INTRAVENOUS
Status: COMPLETED
Start: 2017-04-05 | End: 2017-04-05

## 2017-04-05 RX ORDER — EPINEPHRINE 0.1 MG/ML
1 INJECTION INTRACARDIAC; INTRAVENOUS ONCE
Status: DISCONTINUED | OUTPATIENT
Start: 2017-04-05 | End: 2017-04-05 | Stop reason: HOSPADM

## 2017-04-05 RX ORDER — PROPOFOL 10 MG/ML
INJECTION, EMULSION INTRAVENOUS AS NEEDED
Status: DISCONTINUED | OUTPATIENT
Start: 2017-04-05 | End: 2017-04-05 | Stop reason: HOSPADM

## 2017-04-05 RX ADMIN — PROPOFOL 50 MG: 10 INJECTION, EMULSION INTRAVENOUS at 09:12

## 2017-04-05 RX ADMIN — PROPOFOL 50 MG: 10 INJECTION, EMULSION INTRAVENOUS at 09:20

## 2017-04-05 RX ADMIN — EPINEPHRINE 1 MG: 0.1 INJECTION, SOLUTION ENDOTRACHEAL; INTRACARDIAC; INTRAVENOUS at 09:24

## 2017-04-05 RX ADMIN — PROPOFOL 50 MG: 10 INJECTION, EMULSION INTRAVENOUS at 09:10

## 2017-04-05 RX ADMIN — SODIUM CHLORIDE, SODIUM LACTATE, POTASSIUM CHLORIDE, AND CALCIUM CHLORIDE 1000 ML: 600; 310; 30; 20 INJECTION, SOLUTION INTRAVENOUS at 07:58

## 2017-04-05 NOTE — DISCHARGE INSTRUCTIONS
Gastrointestinal Esophagogastroduodenoscopy (EGD) - Upper Exam Discharge Instructions    1. Call Dr. Haylee Guzmán for any problems or questions. 2. Contact the doctor's office for follow up appointment as directed. 3. Medication may cause drowsiness for several hours, therefore, do not drive or  operate machinery for remainder of the day. 4. No alcohol today. 5. Ordinarily, you may resume regular diet and activity after exam unless otherwise specified by your physician. 6. For mild soreness in your throat you may use Cepacol throat lozenges or warm salt-water gargles as needed. Any additional instructions:  Plan to repeat EGD in 6 months. Office will notify you. No ibuprofen, aleive, motrin or anything that says NSAIDS on the bottle for at least 2 weeks. New prescription for Nadalol. Instructions given to Orlando Johnson and wife.   Instructions given by Hunter Dumont

## 2017-04-05 NOTE — ANESTHESIA PREPROCEDURE EVALUATION
Anesthetic History     Malignant hyperthermia (Family History)          Review of Systems / Medical History  Patient summary reviewed    Pulmonary        Sleep apnea: CPAP           Neuro/Psych              Cardiovascular  Within defined limits  Hypertension          CAD and cardiac stents (1999)  Pertinent negatives: No past MI and angina       GI/Hepatic/Renal     GERD      Liver disease (cirrhosis)     Endo/Other    Diabetes: type 1         Other Findings              Physical Exam    Airway  Mallampati: II  TM Distance: > 6 cm  Neck ROM: normal range of motion   Mouth opening: Normal     Cardiovascular  Regular rate and rhythm,  S1 and S2 normal,  no murmur, click, rub, or gallop             Dental  No notable dental hx       Pulmonary  Breath sounds clear to auscultation               Abdominal         Other Findings            Anesthetic Plan    ASA: 3  Anesthesia type: total IV anesthesia            Anesthetic plan and risks discussed with: Patient

## 2017-04-05 NOTE — ANESTHESIA POSTPROCEDURE EVALUATION
Post-Anesthesia Evaluation and Assessment    Patient: Flori Deluca MRN: [id number]  SSN: [id number]    YOB: 1950  Age: 78 y.o. Sex: male       Cardiovascular Function/Vital Signs  Visit Vitals    /83    Pulse 69    Temp 36.6 °C (97.9 °F)    Resp 18    Ht 5' 6\" (1.676 m)    Wt 70.3 kg (155 lb)    SpO2 96%    BMI 25.02 kg/m2       Patient is status post total IV anesthesia anesthesia for Procedure(s):  ESOPHAGOGASTRODUODENOSCOPY (EGD) 26/ **FAMILY HX - YUMIKO OKD**  ENDOSCOPIC POLYPECTOMY  INJECTION. Nausea/Vomiting: None    Postoperative hydration reviewed and adequate. Pain:  Pain Scale 1: Numeric (0 - 10) (04/05/17 0951)  Pain Intensity 1: 0 (04/05/17 0951)   Managed    Neurological Status: At baseline    Mental Status and Level of Consciousness: Arousable    Pulmonary Status:   O2 Device: Room air (04/05/17 0951)   Adequate oxygenation and airway patent    Complications related to anesthesia: None    Post-anesthesia assessment completed.  No concerns    Signed By: Junior Cornell MD     April 5, 2017

## 2017-04-05 NOTE — IP AVS SNAPSHOT
[address] 
[phone] Patient: Paula Nurse 
MRN: [id number] You are allergic to the following Allergen Reactions Metformin Diarrhea Recent Documentation Height Weight BMI Smoking Status 1.676 m 70.3 kg 25.02 kg/m2 Former Smoker Emergency Contacts Name Discharge Info Relation Home Work Mobile [address] CAREGIVER [3] Spouse [3] [phone] About your hospitalization You were admitted on:  April 5, 2017 You last received care in the:  [facility] You were discharged on:  April 5, 2017 Unit phone number:  [phone] Why you were hospitalized Your primary diagnosis was:  Not on File Providers Seen During Your Hospitalizations Provider Role Specialty Primary office phone Diya Head MD Attending Provider Gastroenterology [phone] Your Primary Care Physician (PCP) Primary Care Physician Office Phone Office Fax Pamula Schlatter [phone] Follow-up Information None Your Appointments Tuesday May 09, 2017  9:00 AM EDT  
LAB with [address]  
[address] [facility] OUTREACH INSURANCE ([address]) [place] [address]  
[phone] Tuesday May 16, 2017  9:00 AM EDT Follow Up with Cassy Vinson MD  
[facility] Hematology and Oncology [facility]) JANIYA/ Rick Cox [address] [place] [id number]  
[phone] Current Discharge Medication List  
  
ASK your doctor about these medications Dose & Instructions Dispensing Information Comments Morning Noon Evening Bedtime  
 aspirin delayed-release 81 mg tablet Your last dose was: Your next dose is:    
   
   
 Dose:  81 mg Take 81 mg by mouth nightly. Refills:  0 atorvastatin 80 mg tablet Commonly known as:  LIPITOR Your last dose was: Your next dose is:    
   
   
 Dose:  40 mg Take 40 mg by mouth nightly. Indications: HYPERCHOLESTEROLEMIA Refills:  0  
     
   
   
   
  
 ferrous sulfate 325 mg (65 mg iron) EC tablet Commonly known as:  IRON Your last dose was: Your next dose is:    
   
   
 Dose:  325 mg Take 1 Tab by mouth two (2) times daily (with meals). Quantity:  60 Tab Refills:  11  
     
   
   
   
  
 isosorbide mononitrate ER 30 mg tablet Commonly known as:  IMDUR Your last dose was: Your next dose is:    
   
   
 Dose:  30 mg Take 30 mg by mouth every morning. Refills:  0  
     
   
   
   
  
 LANTUS 100 unit/mL injection Generic drug:  insulin glargine Your last dose was: Your next dose is:    
   
   
 Dose:  25 Units 25 Units by SubCUTAneous route nightly. Refills:  0  
     
   
   
   
  
 metoprolol tartrate 25 mg tablet Commonly known as:  LOPRESSOR Your last dose was: Your next dose is:    
   
   
 Dose:  25 mg Take 25 mg by mouth two (2) times a day. Indications: HYPERTENSION Refills:  0 NovoLOG Flexpen 100 unit/mL Inpn Generic drug:  insulin aspart Your last dose was: Your next dose is:    
   
   
 Dose:  10 Units 10 Units by SubCUTAneous route Daily (before lunch). SS- If 150-199= 1 unit 200-249= 3 units 250-300= 5 units  Indications: TYPE 2 DIABETES MELLITUS, At dinner 10 units Refills:  0 Omeprazole delayed release 20 mg tablet Commonly known as:  PRILOSEC D/R Your last dose was: Your next dose is:    
   
   
 Dose:  20 mg Take 20 mg by mouth two (2) times a day. Indications: GASTROESOPHAGEAL REFLUX Refills:  0  
     
   
   
   
  
 ondansetron 4 mg disintegrating tablet Commonly known as:  ZOFRAN ODT Your last dose was: Your next dose is:    
   
   
 Dose:  4 mg Take 1 Tab by mouth every eight (8) hours as needed for Nausea. Quantity:  20 Tab Refills:  0  
     
   
   
   
  
 tamsulosin 0.4 mg capsule Commonly known as:  FLOMAX Your last dose was: Your next dose is:    
   
   
 Dose:  0.4 mg Take 0.4 mg by mouth every morning. Refills:  0 Discharge Instructions Gastrointestinal Esophagogastroduodenoscopy (EGD) - Upper Exam Discharge Instructions 1. Call Dr. Mare Peguero for any problems or questions. 2. Contact the doctor's office for follow up appointment as directed. 3. Medication may cause drowsiness for several hours, therefore, do not drive or  operate machinery for remainder of the day. 4. No alcohol today. 5. Ordinarily, you may resume regular diet and activity after exam unless otherwise specified by your physician. 6. For mild soreness in your throat you may use Cepacol throat lozenges or warm salt-water gargles as needed. Any additional instructions:  Plan to repeat EGD in 6 months. Office will notify you. No ibuprofen, aleive, motrin or anything that says NSAIDS on the bottle for at least 2 weeks. New prescription for Nadalol. Instructions given to Kat Zamorano and wife. Instructions given by Lonzie People Discharge Orders None Introducing [facility]! Dear Warren Christopher: Thank you for requesting a [employer] account. Our records indicate that you already have an active [employer] account. You can access your account anytime at [email or web address] "[email or web address] Did you know that you can access your hospital and ER discharge instructions at any time in [employer]? You can also review all of your test results from your hospital stay or ER visit. Additional Information If you have questions, please visit the Frequently Asked Questions section of the [employer] website at [email or web address] "[email or web address]/. Remember, MyChart is NOT to be used for urgent needs. For medical emergencies, dial 911. Now available from your iPhone and Android! General Information Please provide this summary of care documentation to your next provider. Patient Signature:  ____________________________________________________________ Date:  ____________________________________________________________  
  
Gatha Cyphers Provider Signature:  ____________________________________________________________ Date:  ____________________________________________________________

## 2017-04-05 NOTE — PROGRESS NOTES
Patient passing flatus, tolerating po fluids well. Patient escorted to car via wheelchair  by Mariel Oh  for discharge home with wife. Discharge instructions reviewed. Dr. Orlin Rodríguez spoke with pt and family.

## 2017-04-05 NOTE — H&P
Gastroenterology Outpatient History and Physical    Patient: Jose Guadalupela Darian VÁSQUEZ    Physician: Akash Red MD    Vital Signs: Blood pressure 103/71, pulse 68, temperature 97.9 °F (36.6 °C), resp. rate 18, height 5' 6\" (1.676 m), weight 70.3 kg (155 lb), SpO2 95 %. Allergies:    Allergies   Allergen Reactions    Metformin Diarrhea       Chief Complaint: Esoph varices    History of Present Illness: Cirrhosis    Justification for Procedure: As above    History:  Past Medical History:   Diagnosis Date    Biliary cirrhosis ([address] 8/8/2016    BPH (benign prostatic hyperplasia)     CAD (coronary artery disease) 06/06/2016    MI--stent x 1- 1999---- [facility]    Chronic back pain     Cirrhosis ([facility] Dx 7/26/16 6/22/16 CBC and CMP--denies use of alcohol- banding of esophogeal varicies 5/2016- Dr Patsy Grullon    Diabetes mellitus type 2, controlled ([facility] dx 2007    type2-- avg 200 ------ no hypo    GERD (gastroesophageal reflux disease)     controlled with med    Hypercholesterolemia     Hyperlipidemia     Hypertension     controlled with med    Iron deficiency anemia     Esophogeal banding    Malignant hyperthermia due to anesthesia     1st cousin- on mom's side--- 30 yrs ago--per pt-- no one was tested in his family-REPORT TO DR Khadijah Isabel ORDERS    KAREN on CPAP     CPAP used    Varices, esophageal ([facility]     banded 5/2016 Dr Patsy Grullon      Past Surgical History:   Procedure Laterality Date    BREAST SURGERY PROCEDURE UNLISTED  age 14    muscles removed in chest age 14    [place]    stent x1    HX CERVICAL FUSION      ACDF multilevel    HX HEENT      esophageal banding    HX HEENT Left     [place] in [place]  I&D    HX LAP CHOLECYSTECTOMY  ~2010    HX LUMBAR LAMINECTOMY      HX ORTHOPAEDIC      right ankle screw    HX OTHER SURGICAL Left     gangrene left leg-- [facility] in [facility]    HX OTHER SURGICAL      egd    HX SHOULDER ARTHROSCOPY Left     left shoulder    HX TONSILLECTOMY  as child      Social History     Social History    Marital status:      Spouse name: N/A    Number of children: N/A    Years of education: N/A     Social History Main Topics    Smoking status: Former Smoker     Packs/day: 1.00     Years: 40.00     Quit date: 11/25/1997    Smokeless tobacco: Never Used    Alcohol use No      Comment: none x 1 yr-- only occ x 10 yrs    Drug use: No    Sexual activity: Not Asked     Other Topics Concern    None     Social History Narrative    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 Has 2 sons, 7 grandchildren      Family History   Problem Relation Age of Onset    Other Mother      liver disease secondary to hepatitis- from transfusion    Diabetes Mother     Liver Disease Mother     Heart Disease Father      pace maker    Diabetes Sister     Stroke Sister     No Known Problems Sister     No Known Problems Brother     No Known Problems Brother     No Known Problems Sister     Coronary Artery Disease Other      fam hx       Medications:   Prior to Admission medications    Medication Sig Start Date End Date Taking? Authorizing Provider   ferrous sulfate (IRON) 325 mg (65 mg iron) EC tablet Take 1 Tab by mouth two (2) times daily (with meals). 3/28/17  Yes Koby Bowser MD   insulin glargine (LANTUS) 100 unit/mL injection 25 Units by SubCUTAneous route nightly. Yes Historical Provider   isosorbide mononitrate ER (IMDUR) 30 mg tablet Take 30 mg by mouth every morning. Yes Historical Provider   metoprolol (LOPRESSOR) 25 mg tablet Take 25 mg by mouth two (2) times a day. Indications: HYPERTENSION   Yes Historical Provider   atorvastatin (LIPITOR) 80 mg tablet Take 40 mg by mouth nightly. Indications: HYPERCHOLESTEROLEMIA   Yes Historical Provider   Omeprazole delayed release (PRILOSEC D/R) 20 mg tablet Take 20 mg by mouth two (2) times a day.  Indications: GASTROESOPHAGEAL REFLUX   Yes Historical Provider   aspirin delayed-release 81 mg tablet Take 81 mg by mouth nightly. Yes Historical Provider   tamsulosin (FLOMAX) 0.4 mg capsule Take 0.4 mg by mouth every morning. Yes Historical Provider   ondansetron (ZOFRAN ODT) 4 mg disintegrating tablet Take 1 Tab by mouth every eight (8) hours as needed for Nausea. 10/12/16   Nan Turner MD   insulin aspart (NOVOLOG FLEXPEN) 100 unit/mL flexpen 10 Units by SubCUTAneous route Daily (before lunch).  SS-  If 150-199= 1 unit  200-249= 3 units  250-300= 5 units  Indications: TYPE 2 DIABETES MELLITUS, At dinner 10 units    Historical Provider       Physical Exam:   General: alert      Heart: regular rate and rhythm   Lungs: no tachypnea, retractions or cyanosis, Heart exam - S1, S2 normal, no murmur, no gallop, rate regular   Abdominal: Bowel sounds are normal, soft, non distended             Plan of Care/Planned Procedure: EGD    Signed:  Ascencion Romero MD 4/5/2017

## 2017-04-05 NOTE — PROCEDURES
EGD Procedure Note    PreOp Diagnosis:   Esophageal varices  Cirrhosis    PostOp Diagnosis:  Esophageal varices- no intervention  Gastric polyps  Portal hypertensive gastropathy    Medications:  Monitored Anesthesia    Procedure:  EGD   Instrument:   After informed consent was obtained, the patient was sedated and the endoscope was inserted  in the mouth and advanced into the duodenum without difficulty. The scope was slowly withdrawn while the mucosa was carefully inspected, including a retroflexed view of the cardia and fundus. Findings:   Esophagus: The proximal and mid esophagus appeared normal.  In the distal esophagus, There was extensive scarring from previous variceal banding. There were 3 columns of grade 1 esophageal varices. There were no bleeding stigmata. No intervention was required. Stomach: Moderate portal hypertensive gastropathy in the proximal stomach. Mild GAVE in the gastric antrum. At least 4 polyps R. 10 mm or greater and have mild erosions or ulcerations present. One polyp along the incisura has active oozing. These have a hyperplastic appearance, but with ulceration there is a risk of bleeding. The largest polyp with active bleeding was injected with 5 cc of epinephrine at the base. Polypectomy with hot snare. Duodenum:   Mild duodenitis    Recommendations:   Follow up pathology results  Avoid NSAIDs  Consider B-B  Rep EGD 6 months at hosp- Any MD  RV Dr. Albert Gosselin, MD

## 2017-05-09 ENCOUNTER — HOSPITAL ENCOUNTER (OUTPATIENT)
Dept: LAB | Age: 67
Discharge: HOME | End: 2017-05-09

## 2017-05-09 DIAGNOSIS — D50.0 IRON DEFICIENCY ANEMIA DUE TO CHRONIC BLOOD LOSS: Chronic | ICD-10-CM

## 2017-11-04 ENCOUNTER — HOSPITAL ENCOUNTER (EMERGENCY)
Age: 67
Discharge: HOME | End: 2017-11-04
Attending: EMERGENCY MEDICINE
Payer: COMMERCIAL

## 2017-11-04 ENCOUNTER — APPOINTMENT (OUTPATIENT)
Dept: CT IMAGING | Age: 67
End: 2017-11-04
Attending: EMERGENCY MEDICINE
Payer: COMMERCIAL

## 2017-11-04 VITALS
RESPIRATION RATE: 22 BRPM | WEIGHT: 161 LBS | BODY MASS INDEX: 25.88 KG/M2 | TEMPERATURE: 97.5 F | DIASTOLIC BLOOD PRESSURE: 78 MMHG | SYSTOLIC BLOOD PRESSURE: 128 MMHG | HEIGHT: 66 IN | OXYGEN SATURATION: 96 % | HEART RATE: 73 BPM

## 2017-11-04 DIAGNOSIS — K92.2 ACUTE LOWER GI BLEEDING: ICD-10-CM

## 2017-11-04 DIAGNOSIS — R42 DIZZINESS: Primary | ICD-10-CM

## 2017-11-04 LAB
ALBUMIN SERPL-MCNC: 3.5 G/DL (ref 3.2–4.6)
ALBUMIN/GLOB SERPL: 0.9 {RATIO} (ref 1.2–3.5)
ALP SERPL-CCNC: 89 U/L (ref 50–136)
ALT SERPL-CCNC: 32 U/L (ref 12–65)
ANION GAP SERPL CALC-SCNC: 7 MMOL/L (ref 7–16)
AST SERPL-CCNC: 28 U/L (ref 15–37)
ATRIAL RATE: 73 BPM
BASOPHILS # BLD: 0 K/UL (ref 0–0.2)
BASOPHILS NFR BLD: 1 % (ref 0–2)
BILIRUB SERPL-MCNC: 0.4 MG/DL (ref 0.2–1.1)
BUN SERPL-MCNC: 14 MG/DL (ref 8–23)
CALCIUM SERPL-MCNC: 9.2 MG/DL (ref 8.3–10.4)
CALCULATED P AXIS, ECG09: 16 DEGREES
CALCULATED R AXIS, ECG10: 6 DEGREES
CALCULATED T AXIS, ECG11: 12 DEGREES
CHLORIDE SERPL-SCNC: 105 MMOL/L (ref 98–107)
CO2 SERPL-SCNC: 28 MMOL/L (ref 21–32)
CREAT SERPL-MCNC: 0.75 MG/DL (ref 0.8–1.5)
DIAGNOSIS, 93000: NORMAL
DIFFERENTIAL METHOD BLD: ABNORMAL
EOSINOPHIL # BLD: 0.1 K/UL (ref 0–0.8)
EOSINOPHIL NFR BLD: 2 % (ref 0.5–7.8)
ERYTHROCYTE [DISTWIDTH] IN BLOOD BY AUTOMATED COUNT: 14.9 % (ref 11.9–14.6)
GLOBULIN SER CALC-MCNC: 4 G/DL (ref 2.3–3.5)
GLUCOSE SERPL-MCNC: 173 MG/DL (ref 65–100)
HCT VFR BLD AUTO: 39.8 % (ref 41.1–50.3)
HGB BLD-MCNC: 13.5 G/DL (ref 13.6–17.2)
IMM GRANULOCYTES # BLD: 0 K/UL (ref 0–0.5)
IMM GRANULOCYTES NFR BLD: 0 % (ref 0–5)
LIPASE SERPL-CCNC: 206 U/L (ref 73–393)
LYMPHOCYTES # BLD: 0.7 K/UL (ref 0.5–4.6)
LYMPHOCYTES NFR BLD: 16 % (ref 13–44)
MCH RBC QN AUTO: 29.8 PG (ref 26.1–32.9)
MCHC RBC AUTO-ENTMCNC: 33.9 G/DL (ref 31.4–35)
MCV RBC AUTO: 87.9 FL (ref 79.6–97.8)
MONOCYTES # BLD: 0.5 K/UL (ref 0.1–1.3)
MONOCYTES NFR BLD: 12 % (ref 4–12)
NEUTS SEG # BLD: 2.8 K/UL (ref 1.7–8.2)
NEUTS SEG NFR BLD: 69 % (ref 43–78)
P-R INTERVAL, ECG05: 136 MS
PLATELET # BLD AUTO: 96 K/UL (ref 150–450)
PMV BLD AUTO: 11.3 FL (ref 10.8–14.1)
POTASSIUM SERPL-SCNC: 4.2 MMOL/L (ref 3.5–5.1)
PROT SERPL-MCNC: 7.5 G/DL (ref 6.3–8.2)
Q-T INTERVAL, ECG07: 368 MS
QRS DURATION, ECG06: 80 MS
QTC CALCULATION (BEZET), ECG08: 405 MS
RBC # BLD AUTO: 4.53 M/UL (ref 4.23–5.67)
SODIUM SERPL-SCNC: 140 MMOL/L (ref 136–145)
VENTRICULAR RATE, ECG03: 73 BPM
WBC # BLD AUTO: 4.1 K/UL (ref 4.3–11.1)

## 2017-11-04 PROCEDURE — 99284 EMERGENCY DEPT VISIT MOD MDM: CPT | Performed by: EMERGENCY MEDICINE

## 2017-11-04 PROCEDURE — 85025 COMPLETE CBC W/AUTO DIFF WBC: CPT | Performed by: EMERGENCY MEDICINE

## 2017-11-04 PROCEDURE — 80053 COMPREHEN METABOLIC PANEL: CPT | Performed by: EMERGENCY MEDICINE

## 2017-11-04 PROCEDURE — 70450 CT HEAD/BRAIN W/O DYE: CPT

## 2017-11-04 PROCEDURE — 83690 ASSAY OF LIPASE: CPT | Performed by: EMERGENCY MEDICINE

## 2017-11-04 PROCEDURE — 93005 ELECTROCARDIOGRAM TRACING: CPT | Performed by: EMERGENCY MEDICINE

## 2017-11-04 RX ORDER — MECLIZINE HYDROCHLORIDE 25 MG/1
25 TABLET ORAL
Qty: 20 TAB | Refills: 0 | Status: SHIPPED | OUTPATIENT
Start: 2017-11-04 | End: 2017-11-14

## 2017-11-04 RX ORDER — HYDROCORTISONE ACETATE 25 MG/1
25 SUPPOSITORY RECTAL EVERY 12 HOURS
Qty: 24 EACH | Refills: 0 | Status: SHIPPED | OUTPATIENT
Start: 2017-11-04 | End: 2017-11-29

## 2017-11-04 RX ORDER — ONDANSETRON 8 MG/1
8 TABLET, ORALLY DISINTEGRATING ORAL
Status: DISCONTINUED | OUTPATIENT
Start: 2017-11-04 | End: 2017-11-04

## 2017-11-04 RX ORDER — HYDROMORPHONE HYDROCHLORIDE 1 MG/ML
1 INJECTION, SOLUTION INTRAMUSCULAR; INTRAVENOUS; SUBCUTANEOUS
Status: DISCONTINUED | OUTPATIENT
Start: 2017-11-04 | End: 2017-11-04

## 2017-11-04 NOTE — ED NOTES
I have reviewed discharge instructions with the patient and spouse. The patient and spouse verbalized understanding. Patient left ED via Discharge Method: ambulatory to Home  Opportunity for questions and clarification provided. Patient given 2 scripts.

## 2017-11-04 NOTE — ED PROVIDER NOTES
HPI Comments: Presents with bright red blood per rectum this morning. Patient has been having dizziness/lightheadedness in the morning for the past week. He reports nausea with it. It goes away after an hour of being awake. He took a medication he received from a primary care provider for the nausea and that is now gone. He reports a history of esophageal varices and denies vomiting or melena. Patient is a 75 y.o. male presenting with dizziness. The history is provided by the patient. Dizziness   This is a new problem. The current episode started more than 1 week ago. The problem has been gradually worsening. There was no focality noted. Pertinent negatives include no focal weakness, no loss of balance and no mental status change. There has been no fever. Pertinent negatives include no vomiting, no confusion, no headaches and no nausea.         Past Medical History:   Diagnosis Date    Biliary cirrhosis ([address] 8/8/2016    BPH (benign prostatic hyperplasia)     CAD (coronary artery disease) 06/06/2016    MI--stent x 1- 1999---- [facility] by [facility]    Chronic back pain     Cirrhosis ([facility] Dx 7/26/16 6/22/16 CBC and CMP--denies use of alcohol- banding of esophogeal varicies 5/2016- Dr David Mulligan    Diabetes mellitus type 2, controlled ([address] dx 2007    type2-- avg 200 ------ no hypo    GERD (gastroesophageal reflux disease)     controlled with med    Hearing reduced     Hypercholesterolemia     Hyperlipidemia     Hypertension     controlled with med    Iron deficiency anemia     Esophogeal banding    Malignant hyperthermia due to anesthesia     1st cousin- on mom's side--- 30 yrs ago--per pt-- no one was tested in his family-REPORT TO DR Kenia Cramer ORDERS    KAREN on CPAP     CPAP used    Tinnitus     Varices, esophageal ([facility]     banded 5/2016 Dr David Mulligan       Past Surgical History:   Procedure Laterality Date    BREAST SURGERY PROCEDURE UNLISTED  age 14    muscles removed in chest age 14    [place]    stent x1    HX CERVICAL FUSION      ACDF multilevel    HX HEENT      esophageal banding    HX HEENT Left     leg[place]gangrene in [place]-  I&D    HX LAP CHOLECYSTECTOMY  ~2010    HX LUMBAR LAMINECTOMY      HX ORTHOPAEDIC      right ankle screw    HX OTHER SURGICAL Left     gangrene left leg-- wounded in [facility]    HX OTHER SURGICAL      egd    HX SHOULDER ARTHROSCOPY Left     left shoulder    HX TONSILLECTOMY  as child         Family History:   Problem Relation Age of Onset    Other Mother      liver disease secondary to hepatitis- from transfusion    Diabetes Mother     Liver Disease Mother     Heart Disease Father      pace maker    Diabetes Sister     Stroke Sister     No Known Problems Sister     No Known Problems Brother     No Known Problems Brother     No Known Problems Sister     Coronary Artery Disease Other      fam hx       Social History     Social History    Marital status:      Spouse name: N/A    Number of children: N/A    Years of education: N/A     Occupational History    Not on file. 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 Has 2 sons, 7 grandchildren         ALLERGIES: Metformin    Review of Systems   Constitutional: Negative for chills and fever. Gastrointestinal: Negative for nausea and vomiting. Skin: Negative for rash and wound. Neurological: Positive for dizziness. Negative for focal weakness, headaches and loss of balance. Psychiatric/Behavioral: Negative for confusion.    All other systems reviewed and are negative. Vitals:    11/04/17 1109   BP: 120/61   Pulse: 80   Resp: 18   Temp: 97.6 °F (36.4 °C)   SpO2: 97%   Weight: 73 kg (161 lb)   Height: 5' 6\" (1.676 m)            Physical Exam   Constitutional: He is oriented to person, place, and time. He appears well-developed and well-nourished. No distress. HENT:   Head: Normocephalic and atraumatic. Eyes:   Nystagmus to the left   Neck: Normal range of motion. Neck supple. Cardiovascular: Normal rate and regular rhythm. Pulmonary/Chest: Effort normal and breath sounds normal. No respiratory distress. He has no wheezes. He has no rales. Abdominal: Soft. He exhibits no distension. There is no tenderness. There is no rebound and no guarding. Genitourinary: Rectal exam shows guaiac positive stool (bright red blood, active bleeding). Musculoskeletal: Normal range of motion. He exhibits no edema or tenderness. Neurological: He is alert and oriented to person, place, and time. No cranial nerve deficit. Skin: Skin is warm and dry. No rash noted. He is not diaphoretic. No erythema. Psychiatric: He has a normal mood and affect. His behavior is normal.   Nursing note and vitals reviewed. MDM  Number of Diagnoses or Management Options  Diagnosis management comments: D/w GI and will see next week. Will call pt on Monday. Hgb stable. No melena. Has had this dizziness for some time. It resolves so not c/w cva. Will attempt antivert and anusol suppositories.          Amount and/or Complexity of Data Reviewed  Clinical lab tests: ordered and reviewed  Review and summarize past medical records: yes  Discuss the patient with other providers: yes  Independent visualization of images, tracings, or specimens: yes    Risk of Complications, Morbidity, and/or Mortality  Presenting problems: high  Diagnostic procedures: moderate  Management options: high    Patient Progress  Patient progress: improved    ED Course       Procedures

## 2017-11-04 NOTE — ED NOTES
Patient returned from CT and orthostatics have been completed. The patient tolerated procedure well and is in no acute distress. Cardiac monitoring and cycling vitals in place. Provider notified.

## 2017-11-04 NOTE — DISCHARGE INSTRUCTIONS
Dizziness: Care Instructions  Your Care Instructions  Dizziness is the feeling of unsteadiness or fuzziness in your head. It is different than having vertigo, which is a feeling that the room is spinning or that you are moving or falling. It is also different from lightheadedness, which is the feeling that you are about to faint. It can be hard to know what causes dizziness. Some people feel dizzy when they have migraine headaches. Sometimes bouts of flu can make you feel dizzy. Some medical conditions, such as heart problems or high blood pressure, can make you feel dizzy. Many medicines can cause dizziness, including medicines for high blood pressure, pain, or anxiety. If a medicine causes your symptoms, your doctor may recommend that you stop or change the medicine. If it is a problem with your heart, you may need medicine to help your heart work better. If there is no clear reason for your symptoms, your doctor may suggest watching and waiting for a while to see if the dizziness goes away on its own. Follow-up care is a key part of your treatment and safety. Be sure to make and go to all appointments, and call your doctor if you are having problems. It's also a good idea to know your test results and keep a list of the medicines you take. How can you care for yourself at home? · If your doctor recommends or prescribes medicine, take it exactly as directed. Call your doctor if you think you are having a problem with your medicine. · Do not drive while you feel dizzy. · Try to prevent falls. Steps you can take include:  ¨ Using nonskid mats, adding grab bars near the tub, and using night-lights. ¨ Clearing your home so that walkways are free of anything you might trip on. ¨ Letting family and friends know that you have been feeling dizzy. This will help them know how to help you. When should you call for help? Call 911 anytime you think you may need emergency care.  For example, call if:  ? · You passed out (lost consciousness). ? · You have dizziness along with symptoms of a heart attack. These may include:  ¨ Chest pain or pressure, or a strange feeling in the chest.  ¨ Sweating. ¨ Shortness of breath. ¨ Nausea or vomiting. ¨ Pain, pressure, or a strange feeling in the back, neck, jaw, or upper belly or in one or both shoulders or arms. ¨ Lightheadedness or sudden weakness. ¨ A fast or irregular heartbeat. ? · You have symptoms of a stroke. These may include:  ¨ Sudden numbness, tingling, weakness, or loss of movement in your face, arm, or leg, especially on only one side of your body. ¨ Sudden vision changes. ¨ Sudden trouble speaking. ¨ Sudden confusion or trouble understanding simple statements. ¨ Sudden problems with walking or balance. ¨ A sudden, severe headache that is different from past headaches. ?Call your doctor now or seek immediate medical care if:  ? · You feel dizzy and have a fever, headache, or ringing in your ears. ? · You have new or increased nausea and vomiting. ? · Your dizziness does not go away or comes back. ? Watch closely for changes in your health, and be sure to contact your doctor if:  ? · You do not get better as expected. Where can you learn more? Go to http://harinder-filippo.info/. Enter B752 in the search box to learn more about \"Dizziness: Care Instructions. \"  Current as of: March 20, 2017  Content Version: 11.4  © 1316-8309 [employer]. Care instructions adapted under license by [employer] (which disclaims liability or warranty for this information). If you have questions about a medical condition or this instruction, always ask your healthcare professional. [place] [address] any warranty or liability for your use of this information. Vertigo: Exercises  Your Care Instructions  Here are some examples of typical rehabilitation exercises for your condition.  Start each exercise slowly. Ease off the exercise if you start to have pain. Your doctor or physical therapist will tell you when you can start these exercises and which ones will work best for you. How to do the exercises  Exercise 1    1. Stand with a chair in front of you and a wall behind you. If you begin to fall, you may use them for support. 2. Stand with your feet together and your arms at your sides. 3. Move your head up and down 10 times. Exercise 2    1. Move your head side to side 10 times. Exercise 3    1. Move your head diagonally up and down 10 times. Exercise 4    1. Move your head diagonally up and down 10 times on the other side. Follow-up care is a key part of your treatment and safety. Be sure to make and go to all appointments, and call your doctor if you are having problems. It's also a good idea to know your test results and keep a list of the medicines you take. Where can you learn more? Go to http://harinder-filippo.info/. Enter F349 in the search box to learn more about \"Vertigo: Exercises. \"  Current as of: May 4, 2017  Content Version: 11.4  © 4561-6363 [employer], PO[employer]. Care instructions adapted under license by [employer] (which disclaims liability or warranty for this information). If you have questions about a medical condition or this instruction, always ask your healthcare professional. [address] any warranty or liability for your use of this information.

## 2017-11-04 NOTE — ED TRIAGE NOTES
Pt reports being dizzy in the morning for the past week, nausea, and problems with hemorrhoids for the past week. Pt reports after being up for an hour or so the dizziness goes away.

## 2017-11-13 ENCOUNTER — HOSPITAL ENCOUNTER (OUTPATIENT)
Dept: LAB | Age: 67
Discharge: HOME | End: 2017-11-13

## 2017-11-13 DIAGNOSIS — D50.0 IRON DEFICIENCY ANEMIA DUE TO CHRONIC BLOOD LOSS: Chronic | ICD-10-CM

## 2017-11-29 ENCOUNTER — ANESTHESIA EVENT (OUTPATIENT)
Dept: ENDOSCOPY | Age: 67
End: 2017-11-29
Payer: COMMERCIAL

## 2017-11-29 RX ORDER — SODIUM CHLORIDE, SODIUM LACTATE, POTASSIUM CHLORIDE, CALCIUM CHLORIDE 600; 310; 30; 20 MG/100ML; MG/100ML; MG/100ML; MG/100ML
100 INJECTION, SOLUTION INTRAVENOUS CONTINUOUS
Status: CANCELLED | OUTPATIENT
Start: 2017-11-29

## 2017-11-29 RX ORDER — SODIUM CHLORIDE 0.9 % (FLUSH) 0.9 %
5-10 SYRINGE (ML) INJECTION AS NEEDED
Status: CANCELLED | OUTPATIENT
Start: 2017-11-29

## 2017-11-30 ENCOUNTER — HOSPITAL ENCOUNTER (OUTPATIENT)
Age: 67
Setting detail: OUTPATIENT SURGERY
Discharge: HOME | End: 2017-11-30
Attending: INTERNAL MEDICINE | Admitting: INTERNAL MEDICINE
Payer: COMMERCIAL

## 2017-11-30 ENCOUNTER — ANESTHESIA (OUTPATIENT)
Dept: ENDOSCOPY | Age: 67
End: 2017-11-30
Payer: COMMERCIAL

## 2017-11-30 VITALS
DIASTOLIC BLOOD PRESSURE: 77 MMHG | TEMPERATURE: 98.6 F | SYSTOLIC BLOOD PRESSURE: 127 MMHG | WEIGHT: 159 LBS | HEART RATE: 67 BPM | BODY MASS INDEX: 25.55 KG/M2 | HEIGHT: 66 IN | RESPIRATION RATE: 18 BRPM | OXYGEN SATURATION: 98 %

## 2017-11-30 LAB — GLUCOSE BLD STRIP.AUTO-MCNC: 141 MG/DL (ref 65–100)

## 2017-11-30 PROCEDURE — 82962 GLUCOSE BLOOD TEST: CPT

## 2017-11-30 PROCEDURE — 74011250636 HC RX REV CODE- 250/636

## 2017-11-30 PROCEDURE — 74011000250 HC RX REV CODE- 250: Performed by: ANESTHESIOLOGY

## 2017-11-30 PROCEDURE — 74011250636 HC RX REV CODE- 250/636: Performed by: ANESTHESIOLOGY

## 2017-11-30 PROCEDURE — 76060000031 HC ANESTHESIA FIRST 0.5 HR: Performed by: INTERNAL MEDICINE

## 2017-11-30 PROCEDURE — 76040000025: Performed by: INTERNAL MEDICINE

## 2017-11-30 RX ORDER — PROPOFOL 10 MG/ML
INJECTION, EMULSION INTRAVENOUS AS NEEDED
Status: DISCONTINUED | OUTPATIENT
Start: 2017-11-30 | End: 2017-11-30 | Stop reason: HOSPADM

## 2017-11-30 RX ORDER — SODIUM CHLORIDE, SODIUM LACTATE, POTASSIUM CHLORIDE, CALCIUM CHLORIDE 600; 310; 30; 20 MG/100ML; MG/100ML; MG/100ML; MG/100ML
100 INJECTION, SOLUTION INTRAVENOUS CONTINUOUS
Status: DISCONTINUED | OUTPATIENT
Start: 2017-11-30 | End: 2017-11-30 | Stop reason: HOSPADM

## 2017-11-30 RX ORDER — PROPOFOL 10 MG/ML
INJECTION, EMULSION INTRAVENOUS
Status: DISCONTINUED | OUTPATIENT
Start: 2017-11-30 | End: 2017-11-30 | Stop reason: HOSPADM

## 2017-11-30 RX ADMIN — SODIUM CHLORIDE, SODIUM LACTATE, POTASSIUM CHLORIDE, AND CALCIUM CHLORIDE: 600; 310; 30; 20 INJECTION, SOLUTION INTRAVENOUS at 08:45

## 2017-11-30 RX ADMIN — FAMOTIDINE 20 MG: 10 INJECTION, SOLUTION INTRAVENOUS at 07:57

## 2017-11-30 RX ADMIN — SODIUM CHLORIDE, SODIUM LACTATE, POTASSIUM CHLORIDE, AND CALCIUM CHLORIDE 100 ML/HR: 600; 310; 30; 20 INJECTION, SOLUTION INTRAVENOUS at 07:57

## 2017-11-30 RX ADMIN — PROPOFOL 70 MG: 10 INJECTION, EMULSION INTRAVENOUS at 08:49

## 2017-11-30 RX ADMIN — PROPOFOL 160 MCG/KG/MIN: 10 INJECTION, EMULSION INTRAVENOUS at 08:49

## 2017-11-30 NOTE — PROCEDURES
Esophagogastroduodenoscopy    DATE of PROCEDURE: 11/30/2017    MEDICATIONS ADMINISTERED: MAC    INSTRUMENT: GIF    PROCEDURE:  After obtaining informed consent, the patient was placed in the left lateral position and sedated. The endoscope was advanced under direct vision without difficulty. The esophagus, stomach (including retroflexed views) and duodenum were evaluated. The patient was taken to the recovery area in stable condition. FINDINGS:  ESOPHAGUS:   The proximal and mid esophagus are normal.  The distal esophagus has evidence of scarring from prior banding and close observation did not reveal any chains of varices at present. STOMACH:  Immediately upon entry into the stomach there was evidence of moderately severe PHG but without active bleeding/oozing. 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 DUODENUM:  Normal    Estimated blood loss: 0-minimal     PLAN:  1. Continue beta blocker - Nadolol  2. Continue PPI 40mg daily  3. Need to see labs from [facility] - he could have chronically low iron as a result of GAVE and intermittent bleeding. He appears well today without signs of being anemic.   4. Needs OV with MD - 2-3 months    Manan Yeh MD  [facility], [place]

## 2017-11-30 NOTE — DISCHARGE INSTRUCTIONS
Gastrointestinal Esophagogastroduodenoscopy (EGD) - Upper Exam Discharge Instructions    1. Call Dr. Ramu Spencer at [phone] for any problems or questions. 2. Contact the doctor's office for follow up appointment as directed. 3. Medication may cause drowsiness for several hours, therefore, do not drive or                  operate machinery for remainder of the day. 4. No alcohol today. 5. Ordinarily, you may resume regular diet and activity after exam unless otherwise              specified by your physician. 6. For mild soreness in your throat you may use Cepacol throat lozenges or warm               salt-water gargles as needed. Any additional instructions: Follow up with the [facility] and the GI doctors. Your GI appointment with Dr. Karsten Caballero is Thursday Feb 22nd at 9:30 at the [facility] office. Continue current medications. Continue to try to get your [facility] records sent to [facility]. Instructions given to Regine Solorzano and wife.   Instructions given by:  Tosha Barcenas RN

## 2017-11-30 NOTE — IP AVS SNAPSHOT
Stanley Coon 
 
 
 [address] [facility] [address] [facility] 
[phone] Patient: Raquel Mcleod 
MRN: [id number]:[id number] About your hospitalization You were admitted on:  November 30, 2017 You last received care in the:  [facility] You were discharged on:  November 30, 2017 Why you were hospitalized Your primary diagnosis was:  Not on File Things You Need To Do (next 8 weeks) Tuesday Dec 05, 2017 Office Visit with Toi Moore MD at  9:00 AM  
Where:  [facility] ([address] [place] [address]) Discharge Orders None A check francia indicates which time of day the medication should be taken. My Medications ASK your physician about these medications Instructions Each Dose to Equal  
 Morning Noon Evening Bedtime  
 aspirin delayed-release 81 mg tablet Your last dose was: Your next dose is: Take 81 mg by mouth nightly. 81 mg  
    
   
   
   
  
 atorvastatin 80 mg tablet Commonly known as:  LIPITOR Your last dose was: Your next dose is: Take 40 mg by mouth nightly. Indications: HYPERCHOLESTEROLEMIA 40 mg  
    
   
   
   
  
 cholecalciferol (VITAMIN D3) 5,000 unit Tab tablet Commonly known as:  VITAMIN D3 Your last dose was: Your next dose is: Take 5,000 Units by mouth daily. 5000 Units  
    
   
   
   
  
 ferrous sulfate 325 mg (65 mg iron) EC tablet Commonly known as:  IRON Your last dose was: Your next dose is: Take 1 Tab by mouth two (2) times daily (with meals). 325 mg  
    
   
   
   
  
 insulin glargine 100 unit/mL injection Commonly known as:  LANTUS Your last dose was: Your next dose is:    
   
   
 25 Units by SubCUTAneous route nightly. 25 Units  
    
   
   
   
  
 isosorbide mononitrate ER 30 mg tablet Commonly known as:  IMDUR Your last dose was: Your next dose is: Take 60 mg by mouth daily. 60 mg  
    
   
   
   
  
 meclizine 25 mg tablet Commonly known as:  ANTIVERT Your last dose was: Your next dose is: Take 25 mg by mouth as needed. 25 mg NovoLOG Flexpen 100 unit/mL Inpn Generic drug:  insulin aspart Your last dose was: Your next dose is:    
   
   
 10 Units by SubCUTAneous route Daily (before lunch). SS- If 150-199= 1 unit 200-249= 3 units 250-300= 5 units  Indications: TYPE 2 DIABETES MELLITUS, At dinner 10 units 10 Units Omeprazole delayed release 20 mg tablet Commonly known as:  PRILOSEC D/R Your last dose was: Your next dose is: Take 20 mg by mouth two (2) times a day. Indications: GASTROESOPHAGEAL REFLUX  
 20 mg  
    
   
   
   
  
 tamsulosin 0.4 mg capsule Commonly known as:  FLOMAX Your last dose was: Your next dose is: Take 0.4 mg by mouth daily. 0.4 mg  
    
   
   
   
  
 VITAMIN C 250 mg tablet Generic drug:  ascorbic acid (vitamin C) Your last dose was: Your next dose is: Take 250 mg by mouth daily. 250 mg Discharge Instructions Gastrointestinal Esophagogastroduodenoscopy (EGD) - Upper Exam Discharge Instructions 1. Call Dr. Candy Aburto at [phone] for any problems or questions. 2. Contact the doctor's office for follow up appointment as directed. 3. Medication may cause drowsiness for several hours, therefore, do not drive or                  operate machinery for remainder of the day. 4. No alcohol today. 5. Ordinarily, you may resume regular diet and activity after exam unless otherwise              specified by your physician.  
6. For mild soreness in your throat you may use Cepacol throat lozenges or warm               salt-water gargles as needed. Any additional instructions: Follow up with the [place] [facility] and the GI doctors. Your GI appointment with Dr. Santana Alonzo is Thursday Feb 22nd at 9:30 at the [facility] office. Continue current medications. Continue to try to get your [facility] records sent to [place] [address]. Instructions given to Gonzalo Iverson and wife. Instructions given by:  Torey Rodriguez RN Introducing [facility]! Dear Marni Ray: Thank you for requesting a [employer] account. Our records indicate that you already have an active [employer] account. You can access your account anytime at [email or web address] Did you know that you can access your hospital and ER discharge instructions at any time in [employer]? You can also review all of your test results from your hospital stay or ER visit. Additional Information If you have questions, please visit the Frequently Asked Questions section of the [employer] website at [email or web address] Remember, [employer] is NOT to be used for urgent needs. For medical emergencies, dial 911. Now available from your iPhone and Android! Providers Seen During Your Hospitalization Provider Specialty Primary office phone Philippe Dakins, MD Gastroenterology [phone] Your Primary Care Physician (PCP) Primary Care Physician Office Phone Office Fax Celsa Nearing [phone] You are allergic to the following Allergen Reactions Metformin Diarrhea Recent Documentation Height Weight BMI Smoking Status 1.676 m 72.1 kg 25.66 kg/m2 Former Smoker Emergency Contacts Name Discharge Info Relation Home Work Mobile [address] CAREGIVER [3] Spouse [3] [phone] Patient Belongings The following personal items are in your possession at time of discharge: Dental Appliances: Uppers  Visual Aid: Glasses Please provide this summary of care documentation to your next provider. Signatures-by signing, you are acknowledging that this After Visit Summary has been reviewed with you and you have received a copy. Patient Signature:  ____________________________________________________________ Date:  ____________________________________________________________  
  
Citrus Rajani Provider Signature:  ____________________________________________________________ Date:  ____________________________________________________________

## 2017-11-30 NOTE — PROGRESS NOTES
Patient passing flatus, tolerating po fluids well. Patient escorted to car via wheelchair  by Jordyn Smith  for discharge home with wife. Discharge instructions reviewed. Dr. Robert Carbajal spoke with pt and family.

## 2017-11-30 NOTE — ANESTHESIA PREPROCEDURE EVALUATION
Anesthetic History     Malignant hyperthermia (Family History)          Review of Systems / Medical History  Patient summary reviewed    Pulmonary        Sleep apnea: CPAP           Neuro/Psych              Cardiovascular    Hypertension          CAD and cardiac stents (1999)  Pertinent negatives: No past MI and angina       GI/Hepatic/Renal     GERD      Liver disease (cirrhosis)     Endo/Other    Diabetes: type 1         Other Findings              Physical Exam    Airway  Mallampati: II  TM Distance: > 6 cm  Neck ROM: normal range of motion   Mouth opening: Normal     Cardiovascular  Regular rate and rhythm,  S1 and S2 normal,  no murmur, click, rub, or gallop             Dental  No notable dental hx       Pulmonary  Breath sounds clear to auscultation               Abdominal         Other Findings            Anesthetic Plan    ASA: 3  Anesthesia type: total IV anesthesia            Anesthetic plan and risks discussed with: Patient

## 2017-11-30 NOTE — H&P
Gastroenterology Outpatient History and Physical    Patient: Aurelio Vogt II    Physician: Boston Torres MD    Vital Signs: Blood pressure 118/75, pulse 75, temperature 98 °F (36.7 °C), resp. rate 16, height 5' 6\" (1.676 m), weight 72.1 kg (159 lb), SpO2 95 %. Allergies: Allergies   Allergen Reactions    Metformin Diarrhea       Chief Complaint: hx of cryptogenic cirrhosis here for esophageal varices surveillance    History of Present Illness: 75 yr old male patient on Dr Yvonne Leiva with cryptogenic cirrhosis who last had EGD on 4/17 here for surveillance EGD.   He is on nadolol for primary prophylaxis of bleeding    Justification for Procedure: eval varices and gastric polyps    History:  Past Medical History:   Diagnosis Date    Biliary cirrhosis ([facility] 8/8/2016    BPH (benign prostatic hyperplasia)     on the med flomax    CAD (coronary artery disease) 06/06/2016    MI--stent x 1- 1999---- [facility]    Chronic back pain     Cirrhosis ([facility] Dx 7/26/16 6/22/16 CBC and CMP--denies use of alcohol- banding of esophogeal varicies 5/2016- Dr David Mulligan    Diabetes mellitus type 2, controlled ([facility] dx 2007    type2-- avg 200 ------ no hypo---last hgbalc 7.1    GERD (gastroesophageal reflux disease)     controlled with med    Hearing reduced     Hypercholesterolemia     Hyperlipidemia     managed by  meds    Hypertension     controlled with med    Iron deficiency anemia     Esophogeal banding    Malignant hyperthermia due to anesthesia     1st cousin- on mom's side--- 30 yrs ago--per pt-- no one was tested in his family-REPORT TO DR Kenia Cramer ORDERS    KAREN on CPAP     CPAP used    Tinnitus     Varices, esophageal ([facility]     banded 5/2016 Dr David Mulligan      Past Surgical History:   Procedure Laterality Date    BREAST SURGERY PROCEDURE UNLISTED  age 14    muscles removed in chest age 14    [place]    stent x1    HX ANKLE FRACTURE TX Right     pt has screws in r ankle    HX CERVICAL FUSION      ACDF multilevel    HX ENDOSCOPY      x2    HX HEENT      esophageal banding    HX LAP CHOLECYSTECTOMY  ~2010    HX LUMBAR LAMINECTOMY      HX OTHER SURGICAL Left     gangrene left leg-- [facility] in [facility] ARTHROSCOPY Left     HX TONSILLECTOMY  as child      Social History     Social History    Marital status:      Spouse name: N/A    Number of children: N/A    Years of education: N/A     Social History Main Topics    Smoking status: Former Smoker     Packs/day: 1.00     Years: 40.00     Quit date: 11/25/1997    Smokeless tobacco: Never Used    Alcohol use No      Comment: none    Drug use: No    Sexual activity: Not Asked     Other Topics Concern    None     Social History Narrative    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 Has 2 sons, 7 grandchildren      Family History   Problem Relation Age of Onset    Other Mother      liver disease secondary to hepatitis- from transfusion    Diabetes Mother     Liver Disease Mother     Heart Disease Father      pace maker    Diabetes Sister     Stroke Sister     No Known Problems Sister     No Known Problems Brother     No Known Problems Brother     No Known Problems Sister     Coronary Artery Disease Other      fam hx       Medications:   Prior to Admission medications    Medication Sig Start Date End Date Taking? Authorizing Provider   ascorbic acid, vitamin C, (VITAMIN C) 250 mg tablet Take 250 mg by mouth daily. Yes Historical Provider   meclizine (ANTIVERT) 25 mg tablet Take 25 mg by mouth as needed. Yes Historical Provider   insulin glargine (LANTUS) 100 unit/mL injection 25 Units by SubCUTAneous route nightly. Patient taking differently: 35 Units by SubCUTAneous route nightly.  7/28/17  Yes General Aubrey MD   ferrous sulfate (IRON) 325 mg (65 mg iron) EC tablet Take 1 Tab by mouth two (2) times daily (with meals). 7/28/17  Yes Tonya Boyle MD   cholecalciferol, VITAMIN D3, (VITAMIN D3) 5,000 unit tab tablet Take 5,000 Units by mouth daily. Yes Historical Provider   isosorbide mononitrate ER (IMDUR) 30 mg tablet Take 60 mg by mouth daily. Yes Historical Provider   insulin aspart (NOVOLOG FLEXPEN) 100 unit/mL flexpen 10 Units by SubCUTAneous route Daily (before lunch). SS-  If 150-199= 1 unit  200-249= 3 units  250-300= 5 units  Indications: TYPE 2 DIABETES MELLITUS, At dinner 10 units   Yes Historical Provider   atorvastatin (LIPITOR) 80 mg tablet Take 40 mg by mouth nightly. Indications: HYPERCHOLESTEROLEMIA   Yes Historical Provider   Omeprazole delayed release (PRILOSEC D/R) 20 mg tablet Take 20 mg by mouth two (2) times a day. Indications: GASTROESOPHAGEAL REFLUX   Yes Historical Provider   aspirin delayed-release 81 mg tablet Take 81 mg by mouth nightly. Yes Historical Provider   tamsulosin (FLOMAX) 0.4 mg capsule Take 0.4 mg by mouth daily. Yes Historical Provider       Physical Exam:   General: no distress   HEENT: Head: Normocephalic, no lesions, without obvious abnormality. Heart: regular rate and rhythm, S1, S2 normal, no murmur, click, rub or gallop   Lungs: chest clear, no wheezing, rales, normal symmetric air entry   Abdominal: Bowel sounds are normal, liver is not enlarged, spleen is not enlarged. Soft ascites   Neurological: Alert and oriented X 3, normal strength and tone. Normal symmetric reflexes.  Normal coordination and gait   Extremities: extremities normal, atraumatic, no cyanosis or edema     Findings/Diagnosis: cryptogenic cirrhosis and EV as well as PHG and bleeding gastric polyps in the past here for f/up egd    Plan of Care/Planned Procedure: egd    Signed By: Troy Bowden MD     November 30, 2017

## 2017-11-30 NOTE — ANESTHESIA POSTPROCEDURE EVALUATION
Post-Anesthesia Evaluation and Assessment    Patient: Raquel Mcleod MRN: [id number]  SSN: [id number]    YOB: 1950  Age: 75 y.o. Sex: male       Cardiovascular Function/Vital Signs  Visit Vitals    /71    Pulse 67    Temp 37 °C (98.6 °F)    Resp 16    Ht 5' 6\" (1.676 m)    Wt 72.1 kg (159 lb)    SpO2 97%    BMI 25.66 kg/m2       Patient is status post total IV anesthesia anesthesia for Procedure(s):  ESOPHAGOGASTRODUODENOSCOPY (EGD)   BMI 25. Nausea/Vomiting: None    Postoperative hydration reviewed and adequate. Pain:  Pain Scale 1: Numeric (0 - 10) (11/30/17 0920)  Pain Intensity 1: 0 (11/30/17 0920)   Managed    Neurological Status: At baseline    Mental Status and Level of Consciousness: Arousable    Pulmonary Status:   O2 Device: Room air (11/30/17 0920)   Adequate oxygenation and airway patent    Complications related to anesthesia: None    Post-anesthesia assessment completed.  No concerns    Signed By: Diaz Mccarty MD     November 30, 2017

## 2018-05-16 ENCOUNTER — HOSPITAL ENCOUNTER (OUTPATIENT)
Dept: LAB | Age: 68
Discharge: HOME | End: 2018-05-16

## 2018-05-16 DIAGNOSIS — D50.0 IRON DEFICIENCY ANEMIA DUE TO CHRONIC BLOOD LOSS: Chronic | ICD-10-CM

## 2018-05-21 ENCOUNTER — ANESTHESIA (OUTPATIENT)
Dept: ENDOSCOPY | Age: 68
End: 2018-05-21
Payer: COMMERCIAL

## 2018-05-21 ENCOUNTER — HOSPITAL ENCOUNTER (OUTPATIENT)
Age: 68
Setting detail: OUTPATIENT SURGERY
Discharge: HOME | End: 2018-05-21
Attending: INTERNAL MEDICINE | Admitting: INTERNAL MEDICINE
Payer: COMMERCIAL

## 2018-05-21 ENCOUNTER — ANESTHESIA EVENT (OUTPATIENT)
Dept: ENDOSCOPY | Age: 68
End: 2018-05-21
Payer: COMMERCIAL

## 2018-05-21 VITALS
TEMPERATURE: 97.9 F | OXYGEN SATURATION: 98 % | RESPIRATION RATE: 16 BRPM | DIASTOLIC BLOOD PRESSURE: 70 MMHG | WEIGHT: 154 LBS | HEIGHT: 66 IN | SYSTOLIC BLOOD PRESSURE: 105 MMHG | HEART RATE: 57 BPM | BODY MASS INDEX: 24.75 KG/M2

## 2018-05-21 LAB — GLUCOSE BLD STRIP.AUTO-MCNC: 153 MG/DL (ref 65–100)

## 2018-05-21 PROCEDURE — 74011250636 HC RX REV CODE- 250/636: Performed by: ANESTHESIOLOGY

## 2018-05-21 PROCEDURE — 76060000031 HC ANESTHESIA FIRST 0.5 HR: Performed by: INTERNAL MEDICINE

## 2018-05-21 PROCEDURE — 82962 GLUCOSE BLOOD TEST: CPT

## 2018-05-21 PROCEDURE — 74011250636 HC RX REV CODE- 250/636

## 2018-05-21 PROCEDURE — 74011000250 HC RX REV CODE- 250

## 2018-05-21 PROCEDURE — 76040000025: Performed by: INTERNAL MEDICINE

## 2018-05-21 RX ORDER — PROPOFOL 10 MG/ML
INJECTION, EMULSION INTRAVENOUS AS NEEDED
Status: DISCONTINUED | OUTPATIENT
Start: 2018-05-21 | End: 2018-05-21 | Stop reason: HOSPADM

## 2018-05-21 RX ORDER — SODIUM CHLORIDE, SODIUM LACTATE, POTASSIUM CHLORIDE, CALCIUM CHLORIDE 600; 310; 30; 20 MG/100ML; MG/100ML; MG/100ML; MG/100ML
100 INJECTION, SOLUTION INTRAVENOUS CONTINUOUS
Status: DISCONTINUED | OUTPATIENT
Start: 2018-05-21 | End: 2018-05-21 | Stop reason: HOSPADM

## 2018-05-21 RX ORDER — EPHEDRINE SULFATE 50 MG/ML
INJECTION, SOLUTION INTRAVENOUS AS NEEDED
Status: DISCONTINUED | OUTPATIENT
Start: 2018-05-21 | End: 2018-05-21 | Stop reason: HOSPADM

## 2018-05-21 RX ORDER — SODIUM CHLORIDE 0.9 % (FLUSH) 0.9 %
5-10 SYRINGE (ML) INJECTION AS NEEDED
Status: DISCONTINUED | OUTPATIENT
Start: 2018-05-21 | End: 2018-05-21 | Stop reason: HOSPADM

## 2018-05-21 RX ADMIN — PROPOFOL 30 MG: 10 INJECTION, EMULSION INTRAVENOUS at 09:20

## 2018-05-21 RX ADMIN — EPHEDRINE SULFATE 5 MG: 50 INJECTION, SOLUTION INTRAVENOUS at 09:24

## 2018-05-21 RX ADMIN — SODIUM CHLORIDE, SODIUM LACTATE, POTASSIUM CHLORIDE, AND CALCIUM CHLORIDE 100 ML/HR: 600; 310; 30; 20 INJECTION, SOLUTION INTRAVENOUS at 08:23

## 2018-05-21 RX ADMIN — PROPOFOL 50 MG: 10 INJECTION, EMULSION INTRAVENOUS at 09:19

## 2018-05-21 NOTE — PROCEDURES
Esophagogastroduodenoscopy    DATE of PROCEDURE: 5/21/2018    MEDICATIONS ADMINISTERED: MAC    INSTRUMENT: GIF    PROCEDURE:  After obtaining informed consent, the patient was placed in the left lateral position and sedated. The endoscope was advanced under direct vision without difficulty. The esophagus, stomach (including retroflexed views) and duodenum were evaluated. The patient was taken to the recovery area in stable condition. FINDINGS:  ESOPHAGUS:   The proximal and mid esophagus are normal.  The distal esophagus has evidence of scarring from prior banding and close observation did not reveal any chains of varices at present. STOMACH:  Immediately upon entry into the stomach there was evidence of moderately severe PHG but without active bleeding/oozing. 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 DUODENUM:  Normal       Estimated blood loss: 0-minimal     PLAN:  1. Resume Nadolol 40mg once daily  2. Continue iron therapy  3. CBC in 4 weeks and OV in 2-3 months with me  4. No NSAIDs, evaluate aspirin use need  5. Omeprazole 40mg twice daily.       Marni Barrios MD  [facility] [place]

## 2018-05-21 NOTE — ROUTINE PROCESS
Pt's IV discontinued and discharge instructions reviewed with pt and pt's wife. Pt. Discharged home with wife.

## 2018-05-21 NOTE — ANESTHESIA PREPROCEDURE EVALUATION
Anesthetic History     Malignant hyperthermia (Family History, cousin)          Review of Systems / Medical History  Patient summary reviewed    Pulmonary        Sleep apnea: CPAP           Neuro/Psych              Cardiovascular    Hypertension          CAD and cardiac stents (1999)  Pertinent negatives: No past MI and angina       GI/Hepatic/Renal     GERD      Liver disease (cirrhosis)     Endo/Other    Diabetes: type 1         Other Findings              Physical Exam    Airway  Mallampati: II  TM Distance: > 6 cm  Neck ROM: normal range of motion   Mouth opening: Normal     Cardiovascular    Rhythm: regular  Rate: normal         Dental    Dentition: Full upper dentures and Lower dentition intact     Pulmonary  Breath sounds clear to auscultation               Abdominal         Other Findings            Anesthetic Plan    ASA: 3  Anesthesia type: total IV anesthesia          Induction: Intravenous  Anesthetic plan and risks discussed with: Patient

## 2018-05-21 NOTE — IP AVS SNAPSHOT
[address] 
[phone] Patient: Dejuan Coto MRN: [id number] VDY:[id number] About your hospitalization You were admitted on:  May 21, 2018 You last received care in the:  [facility] You were discharged on:  May 21, 2018 Why you were hospitalized Your primary diagnosis was:  Not on File Follow-up Information None Your Scheduled Appointments Wednesday June 27, 2018  8:00 AM EDT Follow Up with Moni Calhoun MD  
[address] [facility] [facility] [address] [place] [id number]  
[phone] Discharge Orders None A check francia indicates which time of day the medication should be taken. My Medications ASK your doctor about these medications Instructions Each Dose to Equal  
 Morning Noon Evening Bedtime  
 aspirin delayed-release 81 mg tablet Your last dose was: Your next dose is: Take 81 mg by mouth nightly. 81 mg  
    
   
   
   
  
 atorvastatin 80 mg tablet Commonly known as:  LIPITOR Your last dose was: Your next dose is: Take 40 mg by mouth nightly. Indications: HYPERCHOLESTEROLEMIA 40 mg  
    
   
   
   
  
 cholecalciferol (VITAMIN D3) 5,000 unit Tab tablet Commonly known as:  VITAMIN D3 Your last dose was: Your next dose is: Take 5,000 Units by mouth daily. 5000 Units  
    
   
   
   
  
 ferrous sulfate 325 mg (65 mg iron) EC tablet Commonly known as:  IRON Your last dose was: Your next dose is: Take 1 Tab by mouth two (2) times daily (with meals). 325 mg  
    
   
   
   
  
 glucose 4 gram chewable tablet Your last dose was: Your next dose is: Take 15 g by mouth as needed. 15 g insulin glargine 100 unit/mL injection Commonly known as:  LANTUS U-100 INSULIN Your last dose was: Your next dose is:    
   
   
 25 Units by SubCUTAneous route nightly. 25 Units  
    
   
   
   
  
 isosorbide mononitrate ER 30 mg tablet Commonly known as:  IMDUR Your last dose was: Your next dose is: Take 60 mg by mouth daily. 60 mg  
    
   
   
   
  
 nadolol 20 mg tablet Commonly known as:  CORGARD Your last dose was: Your next dose is: Take  by mouth daily. NITROSTAT 0.4 mg SL tablet Generic drug:  nitroglycerin Your last dose was: Your next dose is:    
   
   
 by SubLINGual route every five (5) minutes as needed for Chest Pain. NovoLOG Flexpen U-100 Insulin 100 unit/mL Inpn Generic drug:  insulin aspart U-100 Your last dose was: Your next dose is:    
   
   
 10 Units by SubCUTAneous route Daily (before dinner). SS- If 150-199= 1 unit 200-249= 3 units 250-300= 5 units  Indications: type 2 diabetes mellitus, At dinner 13 units 10 Units NUTRITIONAL SUPPLEMENT - FIBER PO Your last dose was: Your next dose is: Take  by mouth. Omeprazole delayed release 20 mg tablet Commonly known as:  PRILOSEC D/R Your last dose was: Your next dose is: Take 20 mg by mouth two (2) times a day. Indications: GASTROESOPHAGEAL REFLUX  
 20 mg  
    
   
   
   
  
 ondansetron 4 mg disintegrating tablet Commonly known as:  ZOFRAN ODT Your last dose was: Your next dose is: Take 4 mg by mouth every eight (8) hours as needed for Nausea. 4 mg  
    
   
   
   
  
 tamsulosin 0.4 mg capsule Commonly known as:  FLOMAX Your last dose was: Your next dose is: Take 0.4 mg by mouth nightly.   
 0.4 mg  
    
   
 VITAMIN C 500 mg tablet Generic drug:  ascorbic acid (vitamin C) Your last dose was: Your next dose is: Take  by mouth. Discharge Instructions Gastrointestinal Esophagogastroduodenoscopy (EGD) - Upper Exam Discharge Instructions 1. Call Analilia Ramirez for any problems or questions. 2. Contact the doctor's office for follow up appointment as directed. 3. Medication may cause drowsiness for several hours, therefore, do not drive or                  operate machinery for remainder of the day. 4. No alcohol today. 5. Ordinarily, you may resume regular diet and activity after exam unless otherwise              specified by your physician. 6. For mild soreness in your throat you may use Cepacol throat lozenges or warm               salt-water gargles as needed. Any additional instructions: resume Nadol 40mg once daily. Continue Iron therapy. CBC in 4 weeks and office visit with Dr. Brady Egan in 2-3 months. No NSAIDS; evaluate use of Aspirin. Omeperazole 40mg twice daily. Instructions given to Prince Gold and other family members. Instructions given by:  Dr. Brady Egan Introducing [facility]! Dear Lee Alaniz: Thank you for requesting a RADEUM account. Our records indicate that you already have an active RADEUM account. You can access your account anytime at [email or web address] Did you know that you can access your hospital and ER discharge instructions at any time in RADEUM? You can also review all of your test results from your hospital stay or ER visit. Additional Information If you have questions, please visit the Frequently Asked Questions section of the RADEUM website at [email or web address] Remember, RADEUM is NOT to be used for urgent needs. For medical emergencies, dial 911. Now available from your iPhone and Android! Introducing Sagar Tena As a Tejinder Ulloa patient, I wanted to make you aware of our electronic visit tool called Sagar Tena. Blue Security 24/7 allows you to connect within minutes with a medical provider 24 hours a day, seven days a week via a mobile device or tablet or logging into a secure website from your computer. You can access Sagar Mckenziefin from anywhere in the [place]. A virtual visit might be right for you when you have a simple condition and feel like you just dont want to get out of bed, or cant get away from work for an appointment, when your regular Marrnegrita Ulloa provider is not available (evenings, weekends or holidays), or when youre out of town and need minor care. Electronic visits cost only $49 and if the Tejinder Ulloa 24/7 provider determines a prescription is needed to treat your condition, one can be electronically transmitted to a nearby pharmacy*. Please take a moment to enroll today if you have not already done so. The enrollment process is free and takes just a few minutes. To enroll, please download the Blue Security 24/7 moisés to your tablet or phone, or visit [email or web address] to enroll on your computer. And, as an [address] [place] [address] patient with a Amuso account, the results of your visits will be scanned into your electronic medical record and your primary care provider will be able to view the scanned results. We urge you to continue to see your regular Marrnegrita Ulloa provider for your ongoing medical care. And while your primary care provider may not be the one available when you seek a Sagar Tena virtual visit, the peace of mind you get from getting a real diagnosis real time can be priceless. For more information on Sagar Da Silvadomingafin, view our Frequently Asked Questions (FAQs) at [email or web address]. Sincerely, 
 
Delio Shaikh MD 
Chief Medical Officer [employer] Siomara Perez *:  certain medications cannot be prescribed via Sagar Tena Providers Seen During Your Hospitalization Provider Specialty Primary office phone Alan Ponce MD Gastroenterology [phone] Your Primary Care Physician (PCP) Primary Care Physician Office Phone Office Fax Tess Jansen [phone] You are allergic to the following Allergen Reactions Metformin Diarrhea Recent Documentation Height Weight BMI Smoking Status 1.676 m 69.9 kg 24.86 kg/m2 Former Smoker Emergency Contacts Name Discharge Info Relation Home Work Mobile [address] CAREGIVER [3] Spouse [3] [phone] Patient Belongings The following personal items are in your possession at time of discharge: 
  Dental Appliances: Uppers  Visual Aid: Glasses   Hearing Aids/Status:  (hearing aids at home) Please provide this summary of care documentation to your next provider. Signatures-by signing, you are acknowledging that this After Visit Summary has been reviewed with you and you have received a copy. Patient Signature:  ____________________________________________________________ Date:  ____________________________________________________________  
  
[facility] Signature:  ____________________________________________________________ Date:  ____________________________________________________________

## 2018-05-21 NOTE — DISCHARGE INSTRUCTIONS
Gastrointestinal Esophagogastroduodenoscopy (EGD) - Upper Exam Discharge Instructions    1. Call Michael Torres for any problems or questions. 2. Contact the doctor's office for follow up appointment as directed. 3. Medication may cause drowsiness for several hours, therefore, do not drive or                  operate machinery for remainder of the day. 4. No alcohol today. 5. Ordinarily, you may resume regular diet and activity after exam unless otherwise              specified by your physician. 6. For mild soreness in your throat you may use Cepacol throat lozenges or warm               salt-water gargles as needed. Any additional instructions: resume Nadol 40mg once daily. Continue Iron therapy. CBC in 4 weeks and office visit with Dr. Stan Arndt in 2-3 months. No NSAIDS; evaluate use of Aspirin. Omeperazole 40mg twice daily. Instructions given to Zo Shen and other family members.   Instructions given by:  Dr. Stan Arndt

## 2018-05-21 NOTE — H&P
Gastroenterology Outpatient History and Physical    Patient: Maryuri Evangelista    Physician: Andrzej Lam MD    Vital Signs: Blood pressure 121/71, pulse 68, temperature 97.9 °F (36.6 °C), resp. rate 16, height 5' 6\" (1.676 m), weight 69.9 kg (154 lb), SpO2 98 %. Allergies:    Allergies   Allergen Reactions    Metformin Diarrhea       Chief Complaint: cirrhosis, hx of PHG and varices     History of Present Illness: 70 yr old male with prior endoscopy on 11/17 showing scarring from prior varices and PHG and GAVE with polyps here for follow-up    Justification for Procedure: eval polyps, GAVE, GI bleeding    History:  Past Medical History:   Diagnosis Date    Biliary cirrhosis ([facility] 8/8/2016    BPH (benign prostatic hyperplasia)     on the med flomax    CAD (coronary artery disease) 06/06/2016    MI--stent x 1- 1999---- [facility]- clean    Chronic back pain     Cirrhosis ([facility] Dx 7/26/16 6/22/16 CBC and CMP--denies use of alcohol- banding of esophogeal varicies 5/2016- Dr Devendra Head    Diabetes mellitus type 2, controlled ([facility] dx 2007    type2-- avg fasting 138--- hypo @ 100-last A1C= 6.7 - on 2/27/18-     GERD (gastroesophageal reflux disease)     controlled with med    Hearing reduced     Hypercholesterolemia     Hyperlipidemia     managed by  meds    Hypertension     controlled with med    Iron deficiency anemia     Esophogeal banding    Malignant hyperthermia due to anesthesia     1st cousin- on mom's side--- 30 yrs ago--per pt-- no one was tested in his family-REPORT TO DR Lisbeth Burch oldest sister has never had it with several surgeries\"    KAREN on CPAP     CPAP used    Tinnitus     Varices, esophageal ([facility]     banded 5/2016 Dr Devendra Head      Past Surgical History:   Procedure Laterality Date    BREAST SURGERY PROCEDURE UNLISTED  age 14    muscles [place] in chest age 14    [place]    st[place]    HX ANKLE FRACTURE TX Right     pt has screws in r ankle    HX CERVICAL FUSION      ACDF multilevel    HX ENDOSCOPY      x2    HX HEENT      esophageal banding    HX LAP CHOLECYSTECTOMY  ~2010    HX LUMBAR LAMINECTOMY      HX OTHER SURGICAL Left     gangrene left leg-- [facility] in [facility] ARTHROSCOPY Left     HX TONSILLECTOMY  as child      Social History     Social History    Marital status:      Spouse name: N/A    Number of children: N/A    Years of education: N/A     Social History Main Topics    Smoking status: Former Smoker     Packs/day: 1.00     Years: 40.00     Quit date: 11/25/1997    Smokeless tobacco: Never Used    Alcohol use No      Comment: none    Drug use: No    Sexual activity: Not Asked     Other Topics Concern    None     Social History Narrative    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 Has 2 sons, 7 grandchildren      Family History   Problem Relation Age of Onset    Other Mother      liver disease secondary to hepatitis- from transfusion    Diabetes Mother     Liver Disease Mother     Heart Disease Father      pace maker    Diabetes Sister     Stroke Sister     No Known Problems Sister     No Known Problems Brother     No Known Problems Brother     No Known Problems Sister     Coronary Artery Disease Other      fam hx       Medications:   Prior to Admission medications    Medication Sig Start Date End Date Taking? Authorizing Provider   NUTRITIONAL SUPPLEMENT - FIBER PO Take  by mouth. Yes Historical Provider   nadolol (CORGARD) 20 mg tablet Take  by mouth daily. Yes Historical Provider   ascorbic acid, vitamin C, (VITAMIN C) 500 mg tablet Take  by mouth. Yes Historical Provider   insulin glargine (LANTUS) 100 unit/mL injection 25 Units by SubCUTAneous route nightly. Patient taking differently: 40 Units by SubCUTAneous route nightly.  7/28/17  Yes Shanta Seymour MD   ferrous sulfate (IRON) 325 mg (65 mg iron) EC tablet Take 1 Tab by mouth two (2) times daily (with meals). Patient taking differently: Take 325 mg by mouth Daily (before breakfast). Indications: Iron Deficiency Anemia 7/28/17  Yes Herbert Arevalo MD   cholecalciferol, VITAMIN D3, (VITAMIN D3) 5,000 unit tab tablet Take 5,000 Units by mouth daily. Yes Historical Provider   isosorbide mononitrate ER (IMDUR) 30 mg tablet Take 60 mg by mouth daily. Yes Historical Provider   insulin aspart (NOVOLOG FLEXPEN) 100 unit/mL flexpen 10 Units by SubCUTAneous route Daily (before dinner). SS-  If 150-199= 1 unit  200-249= 3 units  250-300= 5 units  Indications: type 2 diabetes mellitus, At dinner 13 units   Yes Historical Provider   atorvastatin (LIPITOR) 80 mg tablet Take 40 mg by mouth nightly. Indications: HYPERCHOLESTEROLEMIA   Yes Historical Provider   Omeprazole delayed release (PRILOSEC D/R) 20 mg tablet Take 20 mg by mouth two (2) times a day. Indications: GASTROESOPHAGEAL REFLUX   Yes Historical Provider   aspirin delayed-release 81 mg tablet Take 81 mg by mouth nightly. Yes Historical Provider   tamsulosin (FLOMAX) 0.4 mg capsule Take 0.4 mg by mouth nightly. Yes Historical Provider   glucose 4 gram chewable tablet Take 15 g by mouth as needed. Historical Provider   nitroglycerin (NITROSTAT) 0.4 mg SL tablet by SubLINGual route every five (5) minutes as needed for Chest Pain. Historical Provider   ondansetron (ZOFRAN ODT) 4 mg disintegrating tablet Take 4 mg by mouth every eight (8) hours as needed for Nausea. Historical Provider       Physical Exam:   General: no distress   HEENT: Head: Normocephalic, no lesions, without obvious abnormality.    Heart: regular rate and rhythm, S1, S2 normal, no murmur, click, rub or gallop   Lungs: chest clear, no wheezing, rales, normal symmetric air entry   Abdominal: Bowel sounds are normal, liver is not enlarged, spleen is not enlarged   Neurological: Grossly normal   Extremities: extremities normal, atraumatic, no cyanosis or edema     Findings/Diagnosis: hx of cirrhosis, GAVE, and PHG    Plan of Care/Planned Procedure: egd    Signed By: Alberto Cosme MD     May 21, 2018

## 2018-05-21 NOTE — ANESTHESIA POSTPROCEDURE EVALUATION
Post-Anesthesia Evaluation and Assessment    Patient: Forest Merlin MRN: [id number]  SSN: [id number]    YOB: 1950  Age: 70 y.o. Sex: male       Cardiovascular Function/Vital Signs  Visit Vitals    BP (!) 86/50    Pulse (!) 58    Temp 36.6 °C (97.9 °F)    Resp 16    Ht 5' 6\" (1.676 m)    Wt 69.9 kg (154 lb)    SpO2 98%    BMI 24.86 kg/m2       Patient is status post total IV anesthesia anesthesia for Procedure(s):  ESOPHAGOGASTRODUODENOSCOPY (EGD)  BMI 26. Nausea/Vomiting: None    Postoperative hydration reviewed and adequate. Pain:  Pain Scale 1: Numeric (0 - 10) (05/21/18 0933)  Pain Intensity 1: 0 (05/21/18 0933)   Managed    Neurological Status: At baseline    Mental Status and Level of Consciousness: Alert and oriented     Pulmonary Status:   O2 Device: Nasal cannula (05/21/18 0934)   Adequate oxygenation and airway patent    Complications related to anesthesia: None    Post-anesthesia assessment completed.  No concerns    Signed By: Fatoumata Mahajan MD     May 21, 2018

## 2018-11-09 ENCOUNTER — HOSPITAL ENCOUNTER (OUTPATIENT)
Dept: LAB | Age: 68
Discharge: HOME | End: 2018-11-09

## 2018-11-09 DIAGNOSIS — D50.0 IRON DEFICIENCY ANEMIA DUE TO CHRONIC BLOOD LOSS: Chronic | ICD-10-CM

## 2019-07-17 ENCOUNTER — HOSPITAL ENCOUNTER (OUTPATIENT)
Dept: GENERAL RADIOLOGY | Age: 69
Discharge: HOME | End: 2019-07-17
Payer: COMMERCIAL

## 2019-07-17 ENCOUNTER — HOSPITAL ENCOUNTER (OUTPATIENT)
Dept: LAB | Age: 69
Discharge: HOME | End: 2019-07-17
Payer: COMMERCIAL

## 2019-07-17 DIAGNOSIS — R05.9 COUGH: ICD-10-CM

## 2019-07-17 DIAGNOSIS — Z79.4 CONTROLLED TYPE 2 DIABETES MELLITUS WITHOUT COMPLICATION, WITH LONG-TERM CURRENT USE OF INSULIN (HCC): ICD-10-CM

## 2019-07-17 DIAGNOSIS — E11.9 CONTROLLED TYPE 2 DIABETES MELLITUS WITHOUT COMPLICATION, WITH LONG-TERM CURRENT USE OF INSULIN (HCC): ICD-10-CM

## 2019-07-17 DIAGNOSIS — I10 ESSENTIAL HYPERTENSION: ICD-10-CM

## 2019-07-17 DIAGNOSIS — J20.9 ACUTE BRONCHITIS, UNSPECIFIED ORGANISM: ICD-10-CM

## 2019-07-17 LAB
ALBUMIN SERPL-MCNC: 3.6 G/DL (ref 3.2–4.6)
ALBUMIN/GLOB SERPL: 0.9 {RATIO} (ref 1.2–3.5)
ALP SERPL-CCNC: 88 U/L (ref 50–136)
ALT SERPL-CCNC: 31 U/L (ref 12–65)
ANION GAP SERPL CALC-SCNC: 2 MMOL/L (ref 7–16)
AST SERPL-CCNC: 33 U/L (ref 15–37)
BASOPHILS # BLD: 0.1 K/UL (ref 0–0.2)
BASOPHILS NFR BLD: 1 % (ref 0–2)
BILIRUB SERPL-MCNC: 0.4 MG/DL (ref 0.2–1.1)
BUN SERPL-MCNC: 10 MG/DL (ref 8–23)
CALCIUM SERPL-MCNC: 9 MG/DL (ref 8.3–10.4)
CHLORIDE SERPL-SCNC: 101 MMOL/L (ref 98–107)
CO2 SERPL-SCNC: 34 MMOL/L (ref 21–32)
CREAT SERPL-MCNC: 0.84 MG/DL (ref 0.8–1.5)
DIFFERENTIAL METHOD BLD: ABNORMAL
EOSINOPHIL # BLD: 0.2 K/UL (ref 0–0.8)
EOSINOPHIL NFR BLD: 4 % (ref 0.5–7.8)
ERYTHROCYTE [DISTWIDTH] IN BLOOD BY AUTOMATED COUNT: 14.6 % (ref 11.9–14.6)
GLOBULIN SER CALC-MCNC: 3.9 G/DL (ref 2.3–3.5)
GLUCOSE SERPL-MCNC: 117 MG/DL (ref 65–100)
HCT VFR BLD AUTO: 42.9 % (ref 41.1–50.3)
HGB BLD-MCNC: 14.2 G/DL (ref 13.6–17.2)
IMM GRANULOCYTES # BLD AUTO: 0 K/UL (ref 0–0.5)
IMM GRANULOCYTES NFR BLD AUTO: 0 % (ref 0–5)
LYMPHOCYTES # BLD: 0.5 K/UL (ref 0.5–4.6)
LYMPHOCYTES NFR BLD: 10 % (ref 13–44)
MCH RBC QN AUTO: 29.9 PG (ref 26.1–32.9)
MCHC RBC AUTO-ENTMCNC: 33.1 G/DL (ref 31.4–35)
MCV RBC AUTO: 90.3 FL (ref 79.6–97.8)
MONOCYTES # BLD: 0.8 K/UL (ref 0.1–1.3)
MONOCYTES NFR BLD: 16 % (ref 4–12)
NEUTS SEG # BLD: 3.5 K/UL (ref 1.7–8.2)
NEUTS SEG NFR BLD: 69 % (ref 43–78)
NRBC # BLD: 0 K/UL (ref 0–0.2)
PLATELET # BLD AUTO: 91 K/UL (ref 150–450)
PMV BLD AUTO: 11.7 FL (ref 9.4–12.3)
POTASSIUM SERPL-SCNC: 3.9 MMOL/L (ref 3.5–5.1)
PROT SERPL-MCNC: 7.5 G/DL (ref 6.3–8.2)
RBC # BLD AUTO: 4.75 M/UL (ref 4.23–5.6)
SODIUM SERPL-SCNC: 137 MMOL/L (ref 136–145)
WBC # BLD AUTO: 5.1 K/UL (ref 4.3–11.1)

## 2019-07-17 PROCEDURE — 36415 COLL VENOUS BLD VENIPUNCTURE: CPT

## 2019-07-17 PROCEDURE — 71046 X-RAY EXAM CHEST 2 VIEWS: CPT

## 2019-07-17 PROCEDURE — 80053 COMPREHEN METABOLIC PANEL: CPT

## 2019-07-17 PROCEDURE — 85025 COMPLETE CBC W/AUTO DIFF WBC: CPT

## 2019-07-17 NOTE — PROGRESS NOTES
Spoke with patient today regarding his low platelet count; also conveyed the message about his chest x-ray results from the radiologist; I need to know if the patient's cough is persisting or worsening this morning on the antibiotic; if so, I do recommend he go to the nearest emergency department for further evaluation at this time; thank you, JF

## 2019-07-18 NOTE — PROGRESS NOTES
See message/result note below; please send this result note back to me; I have generated a referral to oncology;

## 2019-08-02 ENCOUNTER — HOSPITAL ENCOUNTER (OUTPATIENT)
Dept: LAB | Age: 69
Discharge: HOME | End: 2019-08-02
Payer: COMMERCIAL

## 2019-08-02 LAB
BASOPHILS # BLD: 0.1 K/UL (ref 0–0.2)
BASOPHILS NFR BLD: 1 % (ref 0–2)
CRP SERPL-MCNC: 2 MG/DL (ref 0–0.9)
DIFFERENTIAL METHOD BLD: ABNORMAL
EOSINOPHIL # BLD: 0.2 K/UL (ref 0–0.8)
EOSINOPHIL NFR BLD: 3 % (ref 0.5–7.8)
ERYTHROCYTE [DISTWIDTH] IN BLOOD BY AUTOMATED COUNT: 14.5 % (ref 11.9–14.6)
ERYTHROCYTE [SEDIMENTATION RATE] IN BLOOD: 15 MM/HR (ref 0–20)
HCT VFR BLD AUTO: 44 % (ref 41.1–50.3)
HGB BLD-MCNC: 14.4 G/DL (ref 13.6–17.2)
IMM GRANULOCYTES # BLD AUTO: 0 K/UL (ref 0–0.5)
IMM GRANULOCYTES NFR BLD AUTO: 0 % (ref 0–5)
LYMPHOCYTES # BLD: 0.7 K/UL (ref 0.5–4.6)
LYMPHOCYTES NFR BLD: 11 % (ref 13–44)
MCH RBC QN AUTO: 29.5 PG (ref 26.1–32.9)
MCHC RBC AUTO-ENTMCNC: 32.7 G/DL (ref 31.4–35)
MCV RBC AUTO: 90.2 FL (ref 79.6–97.8)
MONOCYTES # BLD: 0.8 K/UL (ref 0.1–1.3)
MONOCYTES NFR BLD: 13 % (ref 4–12)
NEUTS SEG # BLD: 4.2 K/UL (ref 1.7–8.2)
NEUTS SEG NFR BLD: 72 % (ref 43–78)
NRBC # BLD: 0 K/UL (ref 0–0.2)
PLATELET # BLD AUTO: 125 K/UL (ref 150–450)
PMV BLD AUTO: 10.8 FL (ref 9.4–12.3)
RBC # BLD AUTO: 4.88 M/UL (ref 4.23–5.6)
URATE SERPL-MCNC: 4 MG/DL (ref 2.6–6)
WBC # BLD AUTO: 5.9 K/UL (ref 4.3–11.1)

## 2019-08-02 PROCEDURE — 84550 ASSAY OF BLOOD/URIC ACID: CPT

## 2019-08-02 PROCEDURE — 85652 RBC SED RATE AUTOMATED: CPT

## 2019-08-02 PROCEDURE — 85025 COMPLETE CBC W/AUTO DIFF WBC: CPT

## 2019-08-02 PROCEDURE — 86140 C-REACTIVE PROTEIN: CPT

## 2019-08-02 PROCEDURE — 86200 CCP ANTIBODY: CPT

## 2019-08-02 PROCEDURE — 86430 RHEUMATOID FACTOR TEST QUAL: CPT

## 2019-08-02 PROCEDURE — 36415 COLL VENOUS BLD VENIPUNCTURE: CPT

## 2019-08-02 PROCEDURE — 86038 ANTINUCLEAR ANTIBODIES: CPT

## 2019-08-04 LAB
ANA TITR SER IF: NEGATIVE {TITER}
CCP IGA+IGG SERPL IA-ACNC: 6 UNITS (ref 0–19)

## 2019-08-05 LAB — RHEUMATOID FACT SER QL LA: NEGATIVE

## 2019-08-08 LAB — HLA-B27 QL NAA+PROBE: NEGATIVE

## 2019-11-07 ENCOUNTER — ANESTHESIA (OUTPATIENT)
Dept: ENDOSCOPY | Age: 69
End: 2019-11-07
Payer: COMMERCIAL

## 2019-11-07 ENCOUNTER — HOSPITAL ENCOUNTER (OUTPATIENT)
Age: 69
Setting detail: OUTPATIENT SURGERY
Discharge: HOME | End: 2019-11-07
Attending: INTERNAL MEDICINE | Admitting: INTERNAL MEDICINE
Payer: COMMERCIAL

## 2019-11-07 ENCOUNTER — ANESTHESIA EVENT (OUTPATIENT)
Dept: ENDOSCOPY | Age: 69
End: 2019-11-07
Payer: COMMERCIAL

## 2019-11-07 VITALS
HEIGHT: 66 IN | RESPIRATION RATE: 14 BRPM | BODY MASS INDEX: 24.59 KG/M2 | SYSTOLIC BLOOD PRESSURE: 155 MMHG | HEART RATE: 64 BPM | OXYGEN SATURATION: 95 % | WEIGHT: 153 LBS | DIASTOLIC BLOOD PRESSURE: 90 MMHG | TEMPERATURE: 98 F

## 2019-11-07 LAB
GLUCOSE BLD STRIP.AUTO-MCNC: 107 MG/DL (ref 65–100)
GLUCOSE BLD STRIP.AUTO-MCNC: 98 MG/DL (ref 65–100)

## 2019-11-07 PROCEDURE — 77030021593 HC FCPS BIOP ENDOSC BSC -A: Performed by: INTERNAL MEDICINE

## 2019-11-07 PROCEDURE — 88312 SPECIAL STAINS GROUP 1: CPT

## 2019-11-07 PROCEDURE — 77030029929: Performed by: INTERNAL MEDICINE

## 2019-11-07 PROCEDURE — 74011250636 HC RX REV CODE- 250/636

## 2019-11-07 PROCEDURE — 88305 TISSUE EXAM BY PATHOLOGIST: CPT

## 2019-11-07 PROCEDURE — 74011250636 HC RX REV CODE- 250/636: Performed by: NURSE ANESTHETIST, CERTIFIED REGISTERED

## 2019-11-07 PROCEDURE — 76040000027: Performed by: INTERNAL MEDICINE

## 2019-11-07 PROCEDURE — 76060000033 HC ANESTHESIA 1 TO 1.5 HR: Performed by: INTERNAL MEDICINE

## 2019-11-07 PROCEDURE — 74011000250 HC RX REV CODE- 250: Performed by: NURSE ANESTHETIST, CERTIFIED REGISTERED

## 2019-11-07 PROCEDURE — 74011250636 HC RX REV CODE- 250/636: Performed by: ANESTHESIOLOGY

## 2019-11-07 PROCEDURE — 77030010936 HC CLP LIG BSC -C: Performed by: INTERNAL MEDICINE

## 2019-11-07 PROCEDURE — 77030013991 HC SNR POLYP ENDOSC BSC -A: Performed by: INTERNAL MEDICINE

## 2019-11-07 PROCEDURE — 82962 GLUCOSE BLOOD TEST: CPT

## 2019-11-07 RX ORDER — EPINEPHRINE 0.1 MG/ML
1 INJECTION INTRACARDIAC; INTRAVENOUS
Status: COMPLETED | OUTPATIENT
Start: 2019-11-07 | End: 2019-11-07

## 2019-11-07 RX ORDER — SODIUM CHLORIDE, SODIUM LACTATE, POTASSIUM CHLORIDE, CALCIUM CHLORIDE 600; 310; 30; 20 MG/100ML; MG/100ML; MG/100ML; MG/100ML
100 INJECTION, SOLUTION INTRAVENOUS CONTINUOUS
Status: DISCONTINUED | OUTPATIENT
Start: 2019-11-07 | End: 2019-11-07 | Stop reason: HOSPADM

## 2019-11-07 RX ORDER — EPINEPHRINE 0.1 MG/ML
INJECTION INTRACARDIAC; INTRAVENOUS
Status: COMPLETED
Start: 2019-11-07 | End: 2019-11-07

## 2019-11-07 RX ORDER — PROPOFOL 10 MG/ML
INJECTION, EMULSION INTRAVENOUS
Status: DISCONTINUED | OUTPATIENT
Start: 2019-11-07 | End: 2019-11-07 | Stop reason: HOSPADM

## 2019-11-07 RX ORDER — SODIUM CHLORIDE, SODIUM LACTATE, POTASSIUM CHLORIDE, CALCIUM CHLORIDE 600; 310; 30; 20 MG/100ML; MG/100ML; MG/100ML; MG/100ML
100 INJECTION, SOLUTION INTRAVENOUS CONTINUOUS
Status: CANCELLED | OUTPATIENT
Start: 2019-11-07

## 2019-11-07 RX ORDER — PROPOFOL 10 MG/ML
INJECTION, EMULSION INTRAVENOUS AS NEEDED
Status: DISCONTINUED | OUTPATIENT
Start: 2019-11-07 | End: 2019-11-07 | Stop reason: HOSPADM

## 2019-11-07 RX ORDER — SODIUM CHLORIDE 0.9 % (FLUSH) 0.9 %
5-40 SYRINGE (ML) INJECTION EVERY 8 HOURS
Status: CANCELLED | OUTPATIENT
Start: 2019-11-07

## 2019-11-07 RX ORDER — EPHEDRINE SULFATE/0.9% NACL/PF 50 MG/5 ML
SYRINGE (ML) INTRAVENOUS AS NEEDED
Status: DISCONTINUED | OUTPATIENT
Start: 2019-11-07 | End: 2019-11-07 | Stop reason: HOSPADM

## 2019-11-07 RX ORDER — SODIUM CHLORIDE 0.9 % (FLUSH) 0.9 %
5-40 SYRINGE (ML) INJECTION AS NEEDED
Status: CANCELLED | OUTPATIENT
Start: 2019-11-07

## 2019-11-07 RX ADMIN — SODIUM CHLORIDE, SODIUM LACTATE, POTASSIUM CHLORIDE, AND CALCIUM CHLORIDE: 600; 310; 30; 20 INJECTION, SOLUTION INTRAVENOUS at 12:57

## 2019-11-07 RX ADMIN — Medication 0.7 MG: at 13:44

## 2019-11-07 RX ADMIN — Medication 10 MG: at 13:32

## 2019-11-07 RX ADMIN — PROPOFOL 50 MG: 10 INJECTION, EMULSION INTRAVENOUS at 13:03

## 2019-11-07 RX ADMIN — Medication 10 MG: at 13:09

## 2019-11-07 RX ADMIN — PROPOFOL 200 MCG/KG/MIN: 10 INJECTION, EMULSION INTRAVENOUS at 13:03

## 2019-11-07 RX ADMIN — EPINEPHRINE 0.7 MG: 0.1 INJECTION INTRACARDIAC; INTRAVENOUS at 13:44

## 2019-11-07 NOTE — ROUTINE PROCESS
VSS. No complaints noted. Education given and reviewed with wife who voiced understanding. Pt wheeled out via wheelchair by Allyssa Crum RN.

## 2019-11-07 NOTE — PROGRESS NOTES
11/07/19 1338   Consult Information   Reason for Consult Initial/Spiritual assessment, patient floor; Initial visit; Other (comment); Request by patient;Pre surgery consult  (Pre-surgery Prayer)   Time In 1300   Time Out 1315   Total Time (in minutes) 15   Pastoral Interventions   Patient Interventions Iconic (affirming the presence of God/Higher Power); Prayer (actual); Initial/Spiritual assessment, patient floor; Initial visit   Follow up Date 11/07/19

## 2019-11-07 NOTE — PROCEDURES
Endoscopic Gastroduodenoscopy and Colonoscopy Procedure Note      Anesthesia/Sedation: MAC IV       Procedure Details:  Informed consent was obtained for the procedure, including sedation risk. Risks of pancreatitis, infection, perforation, hemorrhage, adverse drug reaction and aspiration were discussed. The EGD gastroscope was inserted into the mouth and advanced under direct vision to the second portion of the duodenum. A careful inspection was made as the gastroscope was withdrawn, including a retroflexed view of the proximal stomach; findings and interventions are described below. The patient was placed in the left lateral decubitus position. A rectal examination was performed. The adult colonoscope was inserted into the rectum and advanced under direct vision to the cecum. The quality of the colonic preparation was good. A careful inspection was made as the colonoscope was withdrawn, including a retroflexed view of the rectum; findings and interventions are described below. Findings:       EGD:  OROPHARYNX: Cords and pyriform recesses normal.  ESOPHAGUS: Varices. These were not banded due to the stomach findings as detailed below. Otherwise unremarkable. STOMACH:  Multiple actively bleeding gastric polyps were noted. These were large (all >5mm) These were all removed with snare cautery with injection of 1cc of epinephrine into the base of the polyp prior to removal.  A total of 4 resolution clips were placed to close mucosal defects as well as to prevent post polypectomy bleed  DUODENUM: The bulb and second portions are normal.       Colonoscopy:  ANUS:  Anal exam reveals no masses or hemorrhoids, sphincter tone is normal.  RECTUM: Rectal exam reveals no masses or hemorrhoids. Single ~2mm polyp removed with cold biopsy forceps. SIGMOID COLON: The mucosa is normal with good vascular pattern and without ulcers, diverticula or polyps.   DESCENDING COLON: The mucosa is normal with good vascular pattern and without ulcers or diverticula. ~6 mm polyp removed with hot snare. Resection and retrieval were complete. SPLENIC FLEXURE: The splenic flexure is normal.  TRANSVERSE COLON: The mucosa is normal with good vascular pattern and without ulcers, polyps  or diverticula. HEPATIC FLEXURE: The hepatic flexure is normal.  ASCENDING COLON: The mucosa is normal with good vascular pattern and without ulcers, diverticula or polyps. CECUM: The appendiceal orifice appears normal. The ileocecal valve appears normal.  TERMINAL ILEUM: Not examined. Specimens:   1. Gastric polyps  2. Colon polyps    Estimated Blood Loss: 0-min cc           Complications:   None; patient tolerated the procedure well. Attending Attestation:  I performed the procedure. Impression:    1. Actively bleeding gastric polyp  2. Varices  3. Colon polyps    Recommendations:  1. Repeat in 2-4 weeks for banding and removal of additional gastric polyps  2. No NSAIDS  3. Follow up in the office.       Leandra Monk MD  [facility] [place]

## 2019-11-07 NOTE — DISCHARGE INSTRUCTIONS
Gastrointestinal Esophagogastroduodenoscopy (EGD) - Upper Exam Discharge Instructions    1. Call Dr. Javid Hernandez at [phone] for any problems or questions. 2. Contact the doctor's office for follow up appointment as directed. 3. Medication may cause drowsiness for several hours, therefore:  · Do not drive or operate machinery for remainder of the day. · No alcohol today. · Do not make any important or legal decisions for 24 hours. · Do not sign any legal documents for 24 hours. 5. Ordinarily, you may resume regular diet and activity after exam unless otherwise specified by your physician. 6. For mild soreness in your throat you may use Cepacol throat lozenges or warm salt-water gargles as needed. Any additional instructions: repeat EGD in 2 weeks; office will be in contact. Gastrointestinal Colonoscopy/Flexible Sigmoidoscopy - Lower Exam Discharge Instructions  1. Because of air put into your colon during exam, you may experience some abdominal distension, relieved by the passage of gas, for several hours. 2. Contact your physician if you have any of the following:  · Excessive amount of bleeding - large amount when having a bowel movement. Occasional specks of blood with bowel movement would not be unusual.  · Severe abdominal pain  · Fever or Chills  3. Polyp Removal - follow these additional instructions  · Take Metamucil - 1 tablespoon in juice every morning for 3 days if needed; avoid constipation. · No Aspirin, Advil, Aleve, Nuprin, Ibuprofen, or medications that contain these drugs for 2 weeks. Any additional instructions: follow up pathology      Instructions given to Pj Sanchez and other family members.

## 2019-11-07 NOTE — H&P
History and Physical for Outpatient Procedures             Date: 11/7/2019     History of Present Illness:  Patient presents to undergo EGD and colonoscopy. Patient has cirrhosis with portal hypertension as well as a history of colon polyps.      Past Medical History:   Diagnosis Date    Biliary cirrhosis ([facility] 8/8/2016    BPH (benign prostatic hyperplasia)     on the med flomax    CAD (coronary artery disease) 06/06/2016    MI--stent x 1- 1999---- followed by [facility]-last cath 2004- clean    Chronic back pain     Cirrhosis ([facility] Dx 7/26/16 6/22/16 CBC and CMP--denies use of alcohol- banding of esophogeal varicies 5/2016- Dr Veronia Moritz    Diabetes mellitus type 2, controlled ([facility] [address] [facility] dx 2007    type2-- avg fasting 138--- hypo @ 100-last A1C= 7.1- on 10/2019    GERD (gastroesophageal reflux disease)     controlled with med    Hearing reduced     Hypercholesterolemia     Hyperlipidemia     managed by  meds    Hypertension     controlled with med    Iron deficiency anemia     Esophogeal banding    Malignant hyperthermia due to anesthesia     1st cousin- on mom's side--- 30 yrs ago--per pt-- no one was tested in his family-REPORT TO DR Tobias Umana oldest sister has never had it with several surgeries\"    KAREN on CPAP     CPAP used    Tinnitus     Varices, esophageal ([facility] [address] [facility]     banded 5/2016 Dr Veronia Moritz     Past Surgical History:   Procedure Laterality Date    BREAST SURGERY PROCEDURE UNLISTED  age 14    muscles removed in chest age 14    [place]    stent x1    HX ANKLE FRACTURE TX Right     pt has screws in r ankle    HX CERVICAL FUSION      ACDF multilevel    HX ENDOSCOPY      x2    HX HEENT      esophageal banding    HX LAP CHOLECYSTECTOMY  ~2010    HX LUMBAR LAMINECTOMY      HX OTHER SURGICAL Left     gangrene left leg-- wounded in [place] SHOULDER ARTHROSCOPY Left     HX TONSILLECTOMY  as child      Family History   Problem Relation Age of Onset    Other Mother         liver disease secondary to hepatitis- from transfusion    Diabetes Mother     Liver Disease Mother     Heart Disease Father         pace maker    Diabetes Sister     Stroke Sister     No Known Problems Sister     No Known Problems Brother     No Known Problems Brother     No Known Problems Sister     Coronary Artery Disease Other         fam hx     Social History     Tobacco Use    Smoking status: Former Smoker     Packs/day: 1.00     Years: 40.00     Pack years: 40.00     Last attempt to quit: 1997     Years since quittin.9    Smokeless tobacco: Never Used   Substance Use Topics    Alcohol use: No     Alcohol/week: 0.0 standard drinks     Comment: none        Allergies   Allergen Reactions    Metformin Diarrhea     No current facility-administered medications for this encounter. Review of Systems:  A detailed 10 organ review of systems is obtained with pertinent positives as listed in the History of Present Illness. All others are negative. Objective:     Physical Exam:  There were no vitals taken for this visit. General:  Alert and oriented. Heart: Regular rate and rhythm  Lungs:  Clear to auscultation bilaterally  Abdomen: Soft, nontender, nondistended    Impression/Plan:     Proceed with EGD and colonoscopy as planned. I have discussed with the patient the technique, benefits, alternatives, and risks of these procedures, including medication reaction, immediate or delayed bleeding, or perforation of the gastrointestinal tract.      Signed By: Edward Deng MD     2019

## 2019-11-07 NOTE — ANESTHESIA POSTPROCEDURE EVALUATION
Procedure(s):  ESOPHAGOGASTRODUODENOSCOPY (EGD)  COLONOSCOPY/BMI 25  ESOPHAGOGASTRODUODENAL (EGD) BIOPSY  INJECTION  ENDOSCOPIC BANDING OR LIGATION  ENDOSCOPIC POLYPECTOMY. total IV anesthesia    Anesthesia Post Evaluation        Patient location during evaluation: PACU  Patient participation: complete - patient participated  Level of consciousness: awake  Pain management: adequate  Airway patency: patent  Anesthetic complications: no  Cardiovascular status: acceptable  Respiratory status: acceptable  Hydration status: acceptable  Post anesthesia nausea and vomiting:  none      Vitals Value Taken Time   /85 11/7/2019  2:34 PM   Temp 36.7 °C (98 °F) 11/7/2019  2:11 PM   Pulse 57 11/7/2019  2:40 PM   Resp 14 11/7/2019  2:34 PM   SpO2 95 % 11/7/2019  2:40 PM   Vitals shown include unvalidated device data.

## 2019-12-12 ENCOUNTER — ANESTHESIA EVENT (OUTPATIENT)
Dept: ENDOSCOPY | Age: 69
End: 2019-12-12
Payer: COMMERCIAL

## 2019-12-12 ENCOUNTER — ANESTHESIA (OUTPATIENT)
Dept: ENDOSCOPY | Age: 69
End: 2019-12-12
Payer: COMMERCIAL

## 2019-12-12 ENCOUNTER — HOSPITAL ENCOUNTER (OUTPATIENT)
Age: 69
Setting detail: OUTPATIENT SURGERY
Discharge: HOME | End: 2019-12-12
Attending: INTERNAL MEDICINE | Admitting: INTERNAL MEDICINE
Payer: COMMERCIAL

## 2019-12-12 VITALS
DIASTOLIC BLOOD PRESSURE: 97 MMHG | TEMPERATURE: 98.2 F | SYSTOLIC BLOOD PRESSURE: 141 MMHG | RESPIRATION RATE: 18 BRPM | OXYGEN SATURATION: 98 % | HEART RATE: 76 BPM

## 2019-12-12 LAB — GLUCOSE BLD STRIP.AUTO-MCNC: 127 MG/DL (ref 65–100)

## 2019-12-12 PROCEDURE — 76060000032 HC ANESTHESIA 0.5 TO 1 HR: Performed by: INTERNAL MEDICINE

## 2019-12-12 PROCEDURE — 76040000025: Performed by: INTERNAL MEDICINE

## 2019-12-12 PROCEDURE — 74011000250 HC RX REV CODE- 250: Performed by: REGISTERED NURSE

## 2019-12-12 PROCEDURE — 77030013991 HC SNR POLYP ENDOSC BSC -A: Performed by: INTERNAL MEDICINE

## 2019-12-12 PROCEDURE — 74011250636 HC RX REV CODE- 250/636: Performed by: REGISTERED NURSE

## 2019-12-12 PROCEDURE — 74011250636 HC RX REV CODE- 250/636: Performed by: INTERNAL MEDICINE

## 2019-12-12 PROCEDURE — 77030004927 HC CATH ELECHEMSTAS BSC -C: Performed by: INTERNAL MEDICINE

## 2019-12-12 PROCEDURE — 82962 GLUCOSE BLOOD TEST: CPT

## 2019-12-12 PROCEDURE — 77030010937 HC CLP LIG BSC -I: Performed by: INTERNAL MEDICINE

## 2019-12-12 PROCEDURE — 88312 SPECIAL STAINS GROUP 1: CPT

## 2019-12-12 PROCEDURE — 77030029929: Performed by: INTERNAL MEDICINE

## 2019-12-12 PROCEDURE — 88305 TISSUE EXAM BY PATHOLOGIST: CPT

## 2019-12-12 RX ORDER — PROPOFOL 10 MG/ML
INJECTION, EMULSION INTRAVENOUS
Status: DISCONTINUED | OUTPATIENT
Start: 2019-12-12 | End: 2019-12-12 | Stop reason: HOSPADM

## 2019-12-12 RX ORDER — EPINEPHRINE 1 MG/ML
1 INJECTION, SOLUTION, CONCENTRATE INTRAVENOUS ONCE
Status: COMPLETED | OUTPATIENT
Start: 2019-12-12 | End: 2019-12-12

## 2019-12-12 RX ORDER — LIDOCAINE HYDROCHLORIDE 20 MG/ML
INJECTION, SOLUTION EPIDURAL; INFILTRATION; INTRACAUDAL; PERINEURAL AS NEEDED
Status: DISCONTINUED | OUTPATIENT
Start: 2019-12-12 | End: 2019-12-12 | Stop reason: HOSPADM

## 2019-12-12 RX ORDER — SODIUM CHLORIDE, SODIUM LACTATE, POTASSIUM CHLORIDE, CALCIUM CHLORIDE 600; 310; 30; 20 MG/100ML; MG/100ML; MG/100ML; MG/100ML
1000 INJECTION, SOLUTION INTRAVENOUS CONTINUOUS
Status: DISCONTINUED | OUTPATIENT
Start: 2019-12-12 | End: 2019-12-12 | Stop reason: HOSPADM

## 2019-12-12 RX ADMIN — EPINEPHRINE 1 MG: 1 INJECTION, SOLUTION INTRAMUSCULAR; SUBCUTANEOUS at 09:00

## 2019-12-12 RX ADMIN — LIDOCAINE HYDROCHLORIDE 60 MG: 20 INJECTION, SOLUTION EPIDURAL; INFILTRATION; INTRACAUDAL; PERINEURAL at 08:41

## 2019-12-12 RX ADMIN — SODIUM CHLORIDE, SODIUM LACTATE, POTASSIUM CHLORIDE, AND CALCIUM CHLORIDE 1000 ML: 600; 310; 30; 20 INJECTION, SOLUTION INTRAVENOUS at 07:56

## 2019-12-12 RX ADMIN — SODIUM CHLORIDE, SODIUM LACTATE, POTASSIUM CHLORIDE, AND CALCIUM CHLORIDE: 600; 310; 30; 20 INJECTION, SOLUTION INTRAVENOUS at 08:37

## 2019-12-12 RX ADMIN — PROPOFOL 100 MCG/KG/MIN: 10 INJECTION, EMULSION INTRAVENOUS at 08:41

## 2019-12-12 NOTE — PROCEDURES
Esophagogastroduodenoscopy    DATE of PROCEDURE: 12/12/2019    MEDICATIONS ADMINISTERED: MAC    INSTRUMENT: GIF    PROCEDURE:  After obtaining informed consent, the patient was placed in the left lateral position and sedated. The endoscope was advanced under direct vision without difficulty. The esophagus, stomach (including retroflexed views) and duodenum were evaluated. The patient was taken to the recovery area in stable condition. FINDINGS:    OROPHARYNX: Cords and pyriform recesses normal.  ESOPHAGUS: The esophagus is normal. The proxima and mid portions are normal. The Z-Line is unremarkable. Small esophageal varices noted. STOMACH: Diffuse portal hypertensive gastropathy. Multiple clips from last endoscopy were noted. They were again 3 ulcerated and oozing polyps noted. These were removed with hot snare. The largest polyp removed had some bleeding afterwards. This required epinephrine injection, clip placement, and Gold probe to arrest the bleeding. Hemostasis was achieved. DUODENUM: The bulb and second portions are normal.    Estimated blood loss: 0-minimal     PLAN:  Patient to return in 8 weeks for reevaluation and possible banding. No NSAIDs for 2 weeks. Patient to follow up in the office with primary GI.      Edward Deng MD  [facility] [place]

## 2019-12-12 NOTE — ANESTHESIA PREPROCEDURE EVALUATION
Anesthetic History     Malignant hyperthermia (Family History, cousin)          Review of Systems / Medical History  Patient summary reviewed    Pulmonary        Sleep apnea: CPAP           Neuro/Psych              Cardiovascular    Hypertension          CAD and cardiac stents (1999)  Pertinent negatives: No past MI and angina    Comments: Stress test 2017 low risk scan   GI/Hepatic/Renal     GERD      Liver disease (cirrhosis)     Endo/Other    Diabetes: type 1         Other Findings              Physical Exam    Airway  Mallampati: II  TM Distance: > 6 cm  Neck ROM: normal range of motion   Mouth opening: Normal     Cardiovascular    Rhythm: regular  Rate: normal         Dental    Dentition: Full upper dentures and Lower dentition intact     Pulmonary  Breath sounds clear to auscultation               Abdominal         Other Findings            Anesthetic Plan    ASA: 3  Anesthesia type: total IV anesthesia          Induction: Intravenous  Anesthetic plan and risks discussed with: Patient

## 2019-12-12 NOTE — ANESTHESIA POSTPROCEDURE EVALUATION
Procedure(s):  ESOPHAGOGASTRODUODENOSCOPY (EGD)  ENDOSCOPIC POLYPECTOMY  BICAP. total IV anesthesia    Anesthesia Post Evaluation      Multimodal analgesia: multimodal analgesia used between 6 hours prior to anesthesia start to PACU discharge  Patient location during evaluation: PACU  Patient participation: complete - patient participated  Level of consciousness: awake and alert  Pain management: adequate  Airway patency: patent  Anesthetic complications: no  Cardiovascular status: acceptable  Respiratory status: acceptable  Hydration status: acceptable  Post anesthesia nausea and vomiting:  none      Vitals Value Taken Time   /97 12/12/2019  9:37 AM   Temp     Pulse 72 12/12/2019  9:35 AM   Resp 16 12/12/2019  9:27 AM   SpO2 98 % 12/12/2019  9:37 AM   Vitals shown include unvalidated device data.

## 2019-12-12 NOTE — ROUTINE PROCESS
VSS. No complaints noted at this time. Education given and reviewed with wife.  Pt discharged via wheelchair by [facility].

## 2019-12-12 NOTE — DISCHARGE INSTRUCTIONS
Gastrointestinal Esophagogastroduodenoscopy (EGD) - Upper Exam Discharge Instructions    1. Call Dr. Jocelynn Brooks at [phone] for any problems or questions. 2. Contact the doctor's office for follow up appointment as directed. 3. Medication may cause drowsiness for several hours, therefore:  · Do not drive or operate machinery for remainder of the day. · No alcohol today. · Do not make any important or legal decisions for 24 hours. · Do not sign any legal documents for 24 hours. 5. Ordinarily, you may resume regular diet and activity after exam unless otherwise specified by your physician. 6. For mild soreness in your throat you may use Cepacol throat lozenges or warm  salt-water gargles as needed. Any additional instructions: Patient to return in 8 weeks for reevaluation and possible banding. No NSAIDs for 2 weeks. Patient to follow up in the office with primary GI. Instructions given to Sunny Saxena and other family members.

## 2019-12-12 NOTE — H&P
History and Physical for Outpatient Procedure             Date: 12/12/2019     History of Present Illness:  Patient has presents for EGD due to complaint of large bleeding gastric polyps as well as varices.        Past Medical History:   Diagnosis Date    Biliary cirrhosis ([facility] [address] 8/8/2016    BPH (benign prostatic hyperplasia)     on the med flomax    CAD (coronary artery disease) 06/06/2016    MI--stent x 1- 1999---- followed by [facility]-last cath 2004- clean    Chronic back pain     Cirrhosis ([facility] [address] Dx 7/26/16 6/22/16 CBC and CMP--denies use of alcohol- banding of esophogeal varicies 5/2016- Dr Daniel Mcgrath    Diabetes mellitus type 2, controlled ([facility] [address] dx 2007    type2-- avg fasting 138--- hypo @ 100-last A1C= 7.1- on 10/2019    GERD (gastroesophageal reflux disease)     controlled with med    Hearing reduced     Hypercholesterolemia     Hyperlipidemia     managed by  meds    Hypertension     controlled with med    Iron deficiency anemia     Esophogeal banding    Malignant hyperthermia due to anesthesia     1st cousin- on mom's side--- 30 yrs ago--per pt-- no one was tested in his family-REPORT TO DR Hutchison Shallow oldest sister has never had it with several surgeries\"    KAREN on CPAP     CPAP used    Tinnitus     Varices, esophageal ([facility] [address]     banded 5/2016 Dr Daniel Mcgrath     Past Surgical History:   Procedure Laterality Date    BREAST SURGERY PROCEDURE UNLISTED  age 14    muscles removed in chest age 14    [place]    stent x1    COLONOSCOPY N/A 11/7/2019    COLONOSCOPY/BMI 25 performed by Lisa Becker MD at [address] [place] [address] Right     pt has screws in r ankle    HX CERVICAL FUSION      ACDF multilevel    HX ENDOSCOPY      x2    HX HEENT      esophageal banding    HX LAP CHOLECYSTECTOMY  ~2010    HX LUMBAR LAMINECTOMY      HX OTHER SURGICAL Left     gangrene left leg-- [facility] [place]    HX SHOULDER ARTHROSCOPY Left     HX TONSILLECTOMY  as child     In and  Family History   Problem Relation Age of Onset    Other Mother         liver disease secondary to hepatitis- from transfusion    Diabetes Mother     Liver Disease Mother     Heart Disease Father         pace maker    Diabetes Sister     Stroke Sister     No Known Problems Sister     No Known Problems Brother     No Known Problems Brother     No Known Problems Sister     Coronary Artery Disease Other         fam hx     Social History     Tobacco Use    Smoking status: Former Smoker     Packs/day: 1.00     Years: 40.00     Pack years: 40.00     Last attempt to quit: 1997     Years since quittin.0    Smokeless tobacco: Never Used   Substance Use Topics    Alcohol use: No     Alcohol/week: 0.0 standard drinks     Comment: none        Allergies   Allergen Reactions    Metformin Diarrhea     No current facility-administered medications for this encounter. Review of Systems:  A detailed 10 organ review of systems is obtained with pertinent positives as listed in the History of Present Illness. All others are negative. Objective:     Physical Exam:  Visit Vitals  /82   Pulse 76   Temp 98.2 °F (36.8 °C)   Resp 16   SpO2 98%      General:  Alert and oriented. Heart: Regular rate and rhythm  Lungs:  Clear to auscultation bilaterally  Abdomen: Soft, nontender, nondistended    Impression/Plan:     Proceed with EGD as planned. I have discussed with the patient the technique, benefits, alternatives, and risks of these procedures, including medication reaction, immediate or delayed bleeding, or perforation of the gastrointestinal tract.      Signed By: Lacy Hernandes MD     2019

## 2020-03-19 ENCOUNTER — ANESTHESIA EVENT (OUTPATIENT)
Dept: ENDOSCOPY | Age: 70
End: 2020-03-19
Payer: COMMERCIAL

## 2020-03-23 NOTE — PROGRESS NOTES
Confirmed appointment time with patient and told to arrive two hours early prior to procedure. Understands to arrive at the outpatient center on [address] to be screened with a temperature check before entering the hospital. Leeroy Hunt is confirmed.

## 2020-03-24 ENCOUNTER — HOSPITAL ENCOUNTER (OUTPATIENT)
Age: 70
Setting detail: OUTPATIENT SURGERY
Discharge: HOME | End: 2020-03-24
Attending: INTERNAL MEDICINE | Admitting: INTERNAL MEDICINE
Payer: COMMERCIAL

## 2020-03-24 ENCOUNTER — ANESTHESIA (OUTPATIENT)
Dept: ENDOSCOPY | Age: 70
End: 2020-03-24
Payer: COMMERCIAL

## 2020-03-24 VITALS
BODY MASS INDEX: 25.71 KG/M2 | HEART RATE: 57 BPM | OXYGEN SATURATION: 97 % | DIASTOLIC BLOOD PRESSURE: 72 MMHG | WEIGHT: 160 LBS | TEMPERATURE: 98.6 F | SYSTOLIC BLOOD PRESSURE: 114 MMHG | HEIGHT: 66 IN | RESPIRATION RATE: 16 BRPM

## 2020-03-24 LAB
ERYTHROCYTE [DISTWIDTH] IN BLOOD BY AUTOMATED COUNT: 15.4 % (ref 11.9–14.6)
FERRITIN SERPL-MCNC: 40 NG/ML (ref 8–388)
GLUCOSE BLD STRIP.AUTO-MCNC: 133 MG/DL (ref 65–100)
HCT VFR BLD AUTO: 37.9 % (ref 41.1–50.3)
HGB BLD-MCNC: 12.5 G/DL (ref 13.6–17.2)
IRON SERPL-MCNC: 53 UG/DL (ref 35–150)
MCH RBC QN AUTO: 30 PG (ref 26.1–32.9)
MCHC RBC AUTO-ENTMCNC: 33 G/DL (ref 31.4–35)
MCV RBC AUTO: 90.9 FL (ref 79.6–97.8)
NRBC # BLD: 0 K/UL (ref 0–0.2)
PLATELET # BLD AUTO: 78 K/UL (ref 150–450)
PMV BLD AUTO: 11.4 FL (ref 9.4–12.3)
RBC # BLD AUTO: 4.17 M/UL (ref 4.23–5.6)
WBC # BLD AUTO: 4.2 K/UL (ref 4.3–11.1)

## 2020-03-24 PROCEDURE — 85027 COMPLETE CBC AUTOMATED: CPT

## 2020-03-24 PROCEDURE — 83540 ASSAY OF IRON: CPT

## 2020-03-24 PROCEDURE — 74011250636 HC RX REV CODE- 250/636: Performed by: NURSE ANESTHETIST, CERTIFIED REGISTERED

## 2020-03-24 PROCEDURE — 82962 GLUCOSE BLOOD TEST: CPT

## 2020-03-24 PROCEDURE — 36415 COLL VENOUS BLD VENIPUNCTURE: CPT

## 2020-03-24 PROCEDURE — 76040000025: Performed by: INTERNAL MEDICINE

## 2020-03-24 PROCEDURE — 77030014179 HC APPL CLP RESOL BSC -C: Performed by: INTERNAL MEDICINE

## 2020-03-24 PROCEDURE — 88305 TISSUE EXAM BY PATHOLOGIST: CPT

## 2020-03-24 PROCEDURE — 77030013991 HC SNR POLYP ENDOSC BSC -A: Performed by: INTERNAL MEDICINE

## 2020-03-24 PROCEDURE — 88312 SPECIAL STAINS GROUP 1: CPT

## 2020-03-24 PROCEDURE — 74011000250 HC RX REV CODE- 250: Performed by: NURSE ANESTHETIST, CERTIFIED REGISTERED

## 2020-03-24 PROCEDURE — 82728 ASSAY OF FERRITIN: CPT

## 2020-03-24 PROCEDURE — 76060000031 HC ANESTHESIA FIRST 0.5 HR: Performed by: INTERNAL MEDICINE

## 2020-03-24 PROCEDURE — 74011250636 HC RX REV CODE- 250/636: Performed by: ANESTHESIOLOGY

## 2020-03-24 RX ORDER — LIDOCAINE HYDROCHLORIDE 20 MG/ML
INJECTION, SOLUTION EPIDURAL; INFILTRATION; INTRACAUDAL; PERINEURAL AS NEEDED
Status: DISCONTINUED | OUTPATIENT
Start: 2020-03-24 | End: 2020-03-24 | Stop reason: HOSPADM

## 2020-03-24 RX ORDER — EPINEPHRINE 0.1 MG/ML
INJECTION INTRACARDIAC; INTRAVENOUS
Status: DISCONTINUED
Start: 2020-03-24 | End: 2020-03-24 | Stop reason: HOSPADM

## 2020-03-24 RX ORDER — EPHEDRINE SULFATE/0.9% NACL/PF 50 MG/5 ML
SYRINGE (ML) INTRAVENOUS AS NEEDED
Status: DISCONTINUED | OUTPATIENT
Start: 2020-03-24 | End: 2020-03-24 | Stop reason: HOSPADM

## 2020-03-24 RX ORDER — PROPOFOL 10 MG/ML
INJECTION, EMULSION INTRAVENOUS
Status: DISCONTINUED | OUTPATIENT
Start: 2020-03-24 | End: 2020-03-24 | Stop reason: HOSPADM

## 2020-03-24 RX ORDER — PROPOFOL 10 MG/ML
INJECTION, EMULSION INTRAVENOUS AS NEEDED
Status: DISCONTINUED | OUTPATIENT
Start: 2020-03-24 | End: 2020-03-24 | Stop reason: HOSPADM

## 2020-03-24 RX ORDER — SODIUM CHLORIDE, SODIUM LACTATE, POTASSIUM CHLORIDE, CALCIUM CHLORIDE 600; 310; 30; 20 MG/100ML; MG/100ML; MG/100ML; MG/100ML
1000 INJECTION, SOLUTION INTRAVENOUS CONTINUOUS
Status: DISCONTINUED | OUTPATIENT
Start: 2020-03-24 | End: 2020-03-24 | Stop reason: HOSPADM

## 2020-03-24 RX ADMIN — SODIUM CHLORIDE, SODIUM LACTATE, POTASSIUM CHLORIDE, AND CALCIUM CHLORIDE 1000 ML: 600; 310; 30; 20 INJECTION, SOLUTION INTRAVENOUS at 07:25

## 2020-03-24 RX ADMIN — PROPOFOL 140 MCG/KG/MIN: 10 INJECTION, EMULSION INTRAVENOUS at 08:27

## 2020-03-24 RX ADMIN — Medication 10 MG: at 08:32

## 2020-03-24 RX ADMIN — PROPOFOL 50 MG: 10 INJECTION, EMULSION INTRAVENOUS at 08:27

## 2020-03-24 RX ADMIN — LIDOCAINE HYDROCHLORIDE 60 MG: 20 INJECTION, SOLUTION EPIDURAL; INFILTRATION; INTRACAUDAL; PERINEURAL at 08:27

## 2020-03-24 RX ADMIN — Medication 10 MG: at 08:36

## 2020-03-24 NOTE — PROCEDURES
Esophagogastroduodenoscopy    DATE of PROCEDURE: 3/24/2020    MEDICATIONS ADMINISTERED: MAC    INSTRUMENT: GIF    PROCEDURE:  After obtaining informed consent, the patient was placed in the left lateral position and sedated. The endoscope was advanced under direct vision without difficulty. The esophagus, stomach (including retroflexed views) and duodenum were evaluated. The patient was taken to the recovery area in stable condition. FINDINGS:    OROPHARYNX: Cords and pyriform recesses normal.    ESOPHAGUS: The esophagus is normal. The proximal and mid portions are normal. The distal portion revealed 3 columns of small (<5mm varices) These were too small to band. The Z-Line is unremarkable. STOMACH: The fundus on antegrade and retroflex views is normal. The antrum and pylorus are normal.  4 large polyps were seen in the body. These were removed with hot snare. 1 clip was placed to stop some mild postpolypectomy bleeding. DUODENUM: The bulb and second portions are normal.    Estimated blood loss: 0-minimal     PLAN:  1. Repeat exam in 2 years or sooner if decompensates  2. Continue NSBB (nadolol)  3.  Follow up in office with primary GI      Juan He MD  [facility], [place]

## 2020-03-24 NOTE — DISCHARGE INSTRUCTIONS
Gastrointestinal Esophagogastroduodenoscopy (EGD) - Upper Exam Discharge Instructions    1. Call Dr. Jazz Baltazar at [phone] for any problems or questions. 2. Contact the doctor's office for follow up appointment as directed. 3. Medication may cause drowsiness for several hours, therefore:  · Do not drive or operate machinery for remainder of the day. · No alcohol today. · Do not make any important or legal decisions for 24 hours. · Do not sign any legal documents for 24 hours. 5. Ordinarily, you may resume regular diet and activity after exam unless otherwise specified by your physician. 6. For mild soreness in your throat you may use Cepacol throat lozenges or warm salt-water gargles as needed. Any additional instructions:    1. Repeat exam in 2 years or sooner if symptoms worsen  2. Continue nadolol  3. Follow up in office with primary GI     Instructions given to Jc Spencer and other family members.

## 2020-03-24 NOTE — H&P
History and Physical for Outpatient Procedure             Date: 3/24/2020     History of Present Illness:  Patient has presents for EGD due to complaint of portal hypertension as well as numerous gastric polyps causing bleeding and possible EV banding.       Past Medical History:   Diagnosis Date    BPH (benign prostatic hyperplasia)     on the med flomax    CAD (coronary artery disease)     MI--stent x 1- 1999---- no problems since per pt-- followed by dr. Amador Mcdaniel Chronic back pain     Cirrhosis ([address] Dx 7/26/16    Diabetes mellitus type 2, controlled ([address] dx 2007    type 2-- sqbs am avg- 9-140-- hypo at 52's    GERD (gastroesophageal reflux disease)     controlled with med    Hearing reduced     Hypercholesterolemia     Hyperlipidemia     managed by  meds    Hypertension     controlled with med    Iron deficiency anemia     Esophogeal banding    Malignant hyperthermia due to anesthesia     1st cousin- on mom's side--- 30 yrs ago--per pt-- no one was tested in his family-REPORT TO DR Khai Corea oldest sister has never had it with several surgeries\"    KAREN on CPAP     wears cpap at hs    Tinnitus     bilat    Varices, esophageal ([address]     several bandings per pt     Past Surgical History:   Procedure Laterality Date    BREAST SURGERY PROCEDURE UNLISTED  age 14    muscles removed in chest age 14    [place]    stent x1    COLONOSCOPY N/A 11/7/2019    COLONOSCOPY/BMI 25 performed by Derek Curtis MD at [address] Right     pt has screws in r ankle    HX CERVICAL FUSION      ACDF multilevel    HX ENDOSCOPY      multi    HX HEENT      esophageal banding    HX LAP CHOLECYSTECTOMY  ~2010    HX LUMBAR LAMINECTOMY      HX OTHER SURGICAL Left     gangrene left leg-- wounded in [place] SHOULDER ARTHROSCOPY Left     HX TONSILLECTOMY  as child     In and  Family History   Problem Relation Age of Onset    Other Mother liver disease secondary to hepatitis- from transfusion    Diabetes Mother     Liver Disease Mother     Heart Disease Father         pace maker    Diabetes Sister     Stroke Sister     No Known Problems Sister     No Known Problems Brother     No Known Problems Brother     No Known Problems Sister     Coronary Artery Disease Other         fam hx     Social History     Tobacco Use    Smoking status: Former Smoker     Packs/day: 1.00     Years: 40.00     Pack years: 40.00     Last attempt to quit: 1997     Years since quittin.3    Smokeless tobacco: Never Used   Substance Use Topics    Alcohol use: Not Currently     Alcohol/week: 0.0 standard drinks     Comment: none        Allergies   Allergen Reactions    Metformin Diarrhea     Current Facility-Administered Medications   Medication Dose Route Frequency    lactated Ringers infusion 1,000 mL  1,000 mL IntraVENous CONTINUOUS        Review of Systems:  A detailed 10 organ review of systems is obtained with pertinent positives as listed in the History of Present Illness. All others are negative. Objective:     Physical Exam:  Visit Vitals  /70   Pulse 63   Temp 97.7 °F (36.5 °C)   Resp 13   Ht 5' 6\" (1.676 m)   Wt 72.6 kg (160 lb)   SpO2 95%   BMI 25.82 kg/m²      General:  Alert and oriented. Heart: Regular rate and rhythm  Lungs:  Clear to auscultation bilaterally  Abdomen: Soft, nontender, nondistended    Impression/Plan:     Proceed with EGD as planned. I have discussed with the patient the technique, benefits, alternatives, and risks of these procedures, including medication reaction, immediate or delayed bleeding, or perforation of the gastrointestinal tract.      Signed By: Renee Manuel MD     2020

## 2020-03-24 NOTE — ANESTHESIA PREPROCEDURE EVALUATION
Anesthetic History     Malignant hyperthermia (Family History, cousin)          Review of Systems / Medical History  Patient summary reviewed    Pulmonary        Sleep apnea: CPAP           Neuro/Psych              Cardiovascular    Hypertension          CAD and cardiac stents (1999)  Pertinent negatives: No past MI and angina  Exercise tolerance: >4 METS  Comments: Stress test 2017 low risk scan   GI/Hepatic/Renal     GERD      Liver disease (cirrhosis)     Endo/Other    Diabetes: type 1, using insulin         Other Findings              Physical Exam    Airway  Mallampati: II  TM Distance: > 6 cm  Neck ROM: normal range of motion   Mouth opening: Normal     Cardiovascular    Rhythm: regular  Rate: normal         Dental    Dentition: Full upper dentures and Lower dentition intact     Pulmonary  Breath sounds clear to auscultation               Abdominal         Other Findings            Anesthetic Plan    ASA: 3  Anesthesia type: total IV anesthesia          Induction: Intravenous  Anesthetic plan and risks discussed with: Patient and Spouse

## 2020-03-27 NOTE — ANESTHESIA POSTPROCEDURE EVALUATION
Procedure(s):  ESOPHAGOGASTRODUODENOSCOPY (EGD)/ BMI 25  ENDOSCOPIC BANDING OR LIGATION  ENDOSCOPIC POLYPECTOMY.     total IV anesthesia    Anesthesia Post Evaluation        Patient location during evaluation: PACU  Patient participation: complete - patient participated  Level of consciousness: awake and alert  Pain management: adequate  Airway patency: patent  Anesthetic complications: no  Cardiovascular status: acceptable  Respiratory status: acceptable  Hydration status: acceptable  Post anesthesia nausea and vomiting:  none      Vitals Value Taken Time   /72 3/24/2020  9:21 AM   Temp     Pulse 57 3/24/2020  9:21 AM   Resp 16 3/24/2020  9:21 AM   SpO2 97 % 3/24/2020  9:21 AM

## 2020-04-21 ENCOUNTER — HOSPITAL ENCOUNTER (OUTPATIENT)
Dept: LAB | Age: 70
Discharge: HOME | End: 2020-04-21
Payer: COMMERCIAL

## 2020-04-21 DIAGNOSIS — D50.0 IRON DEFICIENCY ANEMIA DUE TO CHRONIC BLOOD LOSS: ICD-10-CM

## 2020-04-21 LAB
ALBUMIN SERPL-MCNC: 3.5 G/DL (ref 3.2–4.6)
ALBUMIN/GLOB SERPL: 0.9 {RATIO} (ref 1.2–3.5)
ALP SERPL-CCNC: 64 U/L (ref 50–136)
ALT SERPL-CCNC: 33 U/L (ref 12–65)
ANION GAP SERPL CALC-SCNC: 4 MMOL/L (ref 7–16)
AST SERPL-CCNC: 33 U/L (ref 15–37)
BASOPHILS # BLD: 0.1 K/UL (ref 0–0.2)
BASOPHILS NFR BLD: 1 % (ref 0–2)
BILIRUB SERPL-MCNC: 0.5 MG/DL (ref 0.2–1.1)
BUN SERPL-MCNC: 10 MG/DL (ref 8–23)
CALCIUM SERPL-MCNC: 8.9 MG/DL (ref 8.3–10.4)
CHLORIDE SERPL-SCNC: 106 MMOL/L (ref 98–107)
CO2 SERPL-SCNC: 27 MMOL/L (ref 21–32)
CREAT SERPL-MCNC: 0.92 MG/DL (ref 0.8–1.5)
DIFFERENTIAL METHOD BLD: ABNORMAL
EOSINOPHIL # BLD: 0.1 K/UL (ref 0–0.8)
EOSINOPHIL NFR BLD: 3 % (ref 0.5–7.8)
ERYTHROCYTE [DISTWIDTH] IN BLOOD BY AUTOMATED COUNT: 14.9 % (ref 11.9–14.6)
FERRITIN SERPL-MCNC: 42 NG/ML (ref 8–388)
GLOBULIN SER CALC-MCNC: 3.8 G/DL (ref 2.3–3.5)
GLUCOSE SERPL-MCNC: 193 MG/DL (ref 65–100)
HCT VFR BLD AUTO: 42.5 % (ref 41.1–50.3)
HGB BLD-MCNC: 14.2 G/DL (ref 13.6–17.2)
IMM GRANULOCYTES # BLD AUTO: 0 K/UL (ref 0–0.5)
IMM GRANULOCYTES NFR BLD AUTO: 0 % (ref 0–5)
IRON SATN MFR SERPL: 24 %
IRON SERPL-MCNC: 94 UG/DL (ref 35–150)
LYMPHOCYTES # BLD: 0.7 K/UL (ref 0.5–4.6)
LYMPHOCYTES NFR BLD: 15 % (ref 13–44)
MCH RBC QN AUTO: 30.3 PG (ref 26.1–32.9)
MCHC RBC AUTO-ENTMCNC: 33.4 G/DL (ref 31.4–35)
MCV RBC AUTO: 90.6 FL (ref 79.6–97.8)
MONOCYTES # BLD: 0.6 K/UL (ref 0.1–1.3)
MONOCYTES NFR BLD: 13 % (ref 4–12)
NEUTS SEG # BLD: 3 K/UL (ref 1.7–8.2)
NEUTS SEG NFR BLD: 68 % (ref 43–78)
NRBC # BLD: 0 K/UL (ref 0–0.2)
PLATELET # BLD AUTO: 97 K/UL (ref 150–450)
PMV BLD AUTO: 11.8 FL (ref 9.4–12.3)
POTASSIUM SERPL-SCNC: 4 MMOL/L (ref 3.5–5.1)
PROT SERPL-MCNC: 7.3 G/DL (ref 6.3–8.2)
RBC # BLD AUTO: 4.69 M/UL (ref 4.23–5.67)
SODIUM SERPL-SCNC: 137 MMOL/L (ref 136–145)
TIBC SERPL-MCNC: 388 UG/DL (ref 250–450)
WBC # BLD AUTO: 4.5 K/UL (ref 4.3–11.1)

## 2020-04-21 PROCEDURE — 85025 COMPLETE CBC W/AUTO DIFF WBC: CPT

## 2020-04-21 PROCEDURE — 83540 ASSAY OF IRON: CPT

## 2020-04-21 PROCEDURE — 36415 COLL VENOUS BLD VENIPUNCTURE: CPT

## 2020-04-21 PROCEDURE — 82728 ASSAY OF FERRITIN: CPT

## 2020-04-21 PROCEDURE — 80053 COMPREHEN METABOLIC PANEL: CPT

## 2020-06-05 ENCOUNTER — HOSPITAL ENCOUNTER (OUTPATIENT)
Dept: CARDIAC CATH/INVASIVE PROCEDURES | Age: 70
Discharge: HOME | End: 2020-06-05
Attending: INTERNAL MEDICINE | Admitting: INTERNAL MEDICINE
Payer: COMMERCIAL

## 2020-06-05 VITALS
HEIGHT: 66 IN | BODY MASS INDEX: 25.71 KG/M2 | WEIGHT: 160 LBS | DIASTOLIC BLOOD PRESSURE: 71 MMHG | RESPIRATION RATE: 12 BRPM | OXYGEN SATURATION: 99 % | HEART RATE: 77 BPM | SYSTOLIC BLOOD PRESSURE: 100 MMHG

## 2020-06-05 LAB
ALBUMIN SERPL-MCNC: 3.4 G/DL (ref 3.2–4.6)
ALBUMIN/GLOB SERPL: 0.8 {RATIO} (ref 1.2–3.5)
ALP SERPL-CCNC: 75 U/L (ref 50–136)
ALT SERPL-CCNC: 33 U/L (ref 12–65)
ANION GAP SERPL CALC-SCNC: 6 MMOL/L (ref 7–16)
AST SERPL-CCNC: 31 U/L (ref 15–37)
ATRIAL RATE: 64 BPM
BILIRUB SERPL-MCNC: 0.4 MG/DL (ref 0.2–1.1)
BUN SERPL-MCNC: 13 MG/DL (ref 8–23)
CALCIUM SERPL-MCNC: 9.3 MG/DL (ref 8.3–10.4)
CALCULATED P AXIS, ECG09: -28 DEGREES
CALCULATED R AXIS, ECG10: -11 DEGREES
CALCULATED T AXIS, ECG11: 70 DEGREES
CHLORIDE SERPL-SCNC: 107 MMOL/L (ref 98–107)
CO2 SERPL-SCNC: 26 MMOL/L (ref 21–32)
CREAT SERPL-MCNC: 0.87 MG/DL (ref 0.8–1.5)
DIAGNOSIS, 93000: NORMAL
ERYTHROCYTE [DISTWIDTH] IN BLOOD BY AUTOMATED COUNT: 14.4 % (ref 11.9–14.6)
GLOBULIN SER CALC-MCNC: 4.1 G/DL (ref 2.3–3.5)
GLUCOSE SERPL-MCNC: 159 MG/DL (ref 65–100)
HCT VFR BLD AUTO: 43 % (ref 41.1–50.3)
HGB BLD-MCNC: 14 G/DL (ref 13.6–17.2)
INR PPP: 1.1
MAGNESIUM SERPL-MCNC: 2 MG/DL (ref 1.8–2.4)
MCH RBC QN AUTO: 30.1 PG (ref 26.1–32.9)
MCHC RBC AUTO-ENTMCNC: 32.6 G/DL (ref 31.4–35)
MCV RBC AUTO: 92.5 FL (ref 79.6–97.8)
NRBC # BLD: 0 K/UL (ref 0–0.2)
P-R INTERVAL, ECG05: 136 MS
PLATELET # BLD AUTO: 96 K/UL (ref 150–450)
PMV BLD AUTO: 11.2 FL (ref 9.4–12.3)
POTASSIUM SERPL-SCNC: 3.8 MMOL/L (ref 3.5–5.1)
PROT SERPL-MCNC: 7.5 G/DL (ref 6.3–8.2)
PROTHROMBIN TIME: 14.5 SEC (ref 12–14.7)
Q-T INTERVAL, ECG07: 418 MS
QRS DURATION, ECG06: 84 MS
QTC CALCULATION (BEZET), ECG08: 431 MS
RBC # BLD AUTO: 4.65 M/UL (ref 4.23–5.6)
SODIUM SERPL-SCNC: 139 MMOL/L (ref 136–145)
VENTRICULAR RATE, ECG03: 64 BPM
WBC # BLD AUTO: 5.1 K/UL (ref 4.3–11.1)

## 2020-06-05 PROCEDURE — 85027 COMPLETE CBC AUTOMATED: CPT

## 2020-06-05 PROCEDURE — C1769 GUIDE WIRE: HCPCS

## 2020-06-05 PROCEDURE — C1894 INTRO/SHEATH, NON-LASER: HCPCS

## 2020-06-05 PROCEDURE — 74011636320 HC RX REV CODE- 636/320: Performed by: INTERNAL MEDICINE

## 2020-06-05 PROCEDURE — 93454 CORONARY ARTERY ANGIO S&I: CPT

## 2020-06-05 PROCEDURE — 77030004559 HC CATH ANGI DX SUPT CARD -B

## 2020-06-05 PROCEDURE — 93306 TTE W/DOPPLER COMPLETE: CPT

## 2020-06-05 PROCEDURE — 85610 PROTHROMBIN TIME: CPT

## 2020-06-05 PROCEDURE — 93005 ELECTROCARDIOGRAM TRACING: CPT | Performed by: INTERNAL MEDICINE

## 2020-06-05 PROCEDURE — 77030004558 HC CATH ANGI DX SUPR TORQ CARD -A

## 2020-06-05 PROCEDURE — 80053 COMPREHEN METABOLIC PANEL: CPT

## 2020-06-05 PROCEDURE — 83735 ASSAY OF MAGNESIUM: CPT

## 2020-06-05 PROCEDURE — 77030016699 HC CATH ANGI DX INFN1 CARD -A

## 2020-06-05 PROCEDURE — 77030015766

## 2020-06-05 PROCEDURE — 99153 MOD SED SAME PHYS/QHP EA: CPT

## 2020-06-05 PROCEDURE — 74011250636 HC RX REV CODE- 250/636: Performed by: INTERNAL MEDICINE

## 2020-06-05 PROCEDURE — 77030029997 HC DEV COM RDL R BND TELE -B

## 2020-06-05 PROCEDURE — 99152 MOD SED SAME PHYS/QHP 5/>YRS: CPT

## 2020-06-05 RX ORDER — FENTANYL CITRATE 50 UG/ML
25-50 INJECTION, SOLUTION INTRAMUSCULAR; INTRAVENOUS
Status: DISCONTINUED | OUTPATIENT
Start: 2020-06-05 | End: 2020-06-05 | Stop reason: HOSPADM

## 2020-06-05 RX ORDER — HEPARIN SODIUM 10000 [USP'U]/ML
1000-10000 INJECTION, SOLUTION INTRAVENOUS; SUBCUTANEOUS
Status: DISCONTINUED | OUTPATIENT
Start: 2020-06-05 | End: 2020-06-05 | Stop reason: HOSPADM

## 2020-06-05 RX ORDER — LIDOCAINE HYDROCHLORIDE 10 MG/ML
10-30 INJECTION, SOLUTION EPIDURAL; INFILTRATION; INTRACAUDAL; PERINEURAL ONCE
Status: DISCONTINUED | OUTPATIENT
Start: 2020-06-05 | End: 2020-06-05 | Stop reason: HOSPADM

## 2020-06-05 RX ORDER — SODIUM CHLORIDE 9 MG/ML
250 INJECTION, SOLUTION INTRAVENOUS CONTINUOUS
Status: ACTIVE | OUTPATIENT
Start: 2020-06-05 | End: 2020-06-05

## 2020-06-05 RX ORDER — SODIUM CHLORIDE 0.9 % (FLUSH) 0.9 %
5-40 SYRINGE (ML) INJECTION AS NEEDED
Status: DISCONTINUED | OUTPATIENT
Start: 2020-06-05 | End: 2020-06-05 | Stop reason: HOSPADM

## 2020-06-05 RX ORDER — SODIUM CHLORIDE 0.9 % (FLUSH) 0.9 %
5-40 SYRINGE (ML) INJECTION EVERY 8 HOURS
Status: DISCONTINUED | OUTPATIENT
Start: 2020-06-05 | End: 2020-06-05 | Stop reason: HOSPADM

## 2020-06-05 RX ORDER — GUAIFENESIN 100 MG/5ML
81-324 LIQUID (ML) ORAL ONCE
Status: DISCONTINUED | OUTPATIENT
Start: 2020-06-05 | End: 2020-06-05 | Stop reason: HOSPADM

## 2020-06-05 RX ORDER — HEPARIN SODIUM 200 [USP'U]/100ML
3 INJECTION, SOLUTION INTRAVENOUS CONTINUOUS
Status: DISCONTINUED | OUTPATIENT
Start: 2020-06-05 | End: 2020-06-05 | Stop reason: HOSPADM

## 2020-06-05 RX ORDER — MIDAZOLAM HYDROCHLORIDE 1 MG/ML
.5-2 INJECTION, SOLUTION INTRAMUSCULAR; INTRAVENOUS
Status: DISCONTINUED | OUTPATIENT
Start: 2020-06-05 | End: 2020-06-05 | Stop reason: HOSPADM

## 2020-06-05 RX ADMIN — MIDAZOLAM 2 MG: 1 INJECTION INTRAMUSCULAR; INTRAVENOUS at 10:53

## 2020-06-05 RX ADMIN — FENTANYL CITRATE 50 MCG: 0.05 INJECTION, SOLUTION INTRAMUSCULAR; INTRAVENOUS at 09:25

## 2020-06-05 RX ADMIN — IOPAMIDOL 60 ML: 755 INJECTION, SOLUTION INTRAVENOUS at 10:53

## 2020-06-05 NOTE — DISCHARGE INSTRUCTIONS
HEART CATHETERIZATION/ANGIOGRAPHY DISCHARGE INSTRUCTIONS    1. Check puncture site frequently for swelling or bleeding. If there is any bleeding, lie down and apply pressure over the area with a clean towel or washcloth. Notify your doctor for any redness, swelling, drainage, or oozing from the puncture site. Notify your doctor for any fever or chills. 2. If the extremity becomes cold, numb, or painful call Dr. Brannon Maciel at [phone].  3. Activity should be limited for the next 48 hours. Climb stairs as little as possible and avoid any stooping, bending, or strenuous activity for 48 hours. No heavy lifting (anything over 10 pounds) for 3 days. 4. You may resume your usual diet. Drink more fluids than usual.  5. Have a responsible person drive you home and stay with you for at least 24 hours after your heart catheterization/angiography. 6. You may remove bandage from your Right Arm in 24 hours. You may shower in 24 hours. No tub baths, hot tubs, or swimming for 1 week. Do not place any lotions, creams, powders, or ointments over puncture site for 1 week. You may place a clean band-aid over the puncture site each day for 5 days. Change daily. I have read the above instructions and have had the opportunity to ask questions.       Patient: ________________________   Date: 6/5/2020    Witness: _______________________   Date: 6/5/2020

## 2020-06-05 NOTE — PROGRESS NOTES
Radial compression band removed at 1245 after slowly reducing air from 14 cc to zero as per hospital protocol. No bleeding or hematoma noted. 2 x 2 gauze with tegaderm placed over puncture site. The affected extremity is warm and dry to the touch. Frequent vital signs documented per flowheet. Patient instructed to call if any bleeding noted on gauze. Patient verbalized understanding the nursing instructions.

## 2020-06-05 NOTE — PROGRESS NOTES
TRANSFER - IN REPORT:    Verbal report received from Anette ZARATE RN on [facility] being received from [facility] for routine progression of care      Report consisted of patients Situation, Background, Assessment and Recommendations(SBAR). Information from the following report(s) Procedure Summary was reviewed with the receiving nurse. Opportunity for questions and clarification was provided. Assessment completed upon patients arrival to unit and care assumed.

## 2020-06-05 NOTE — PROCEDURES
[facility]  CARDIAC CATH    Name:  Kasey Arguello  MR#:  [id number]  :  1950  ACCOUNT #:  [de-identified]  DATE OF SERVICE:  2020    PRIMARY CARDIOLOGIST:  Tamra Chille MD    PRIMARY CARE PHYSICIAN:  Shilpa Llanes. Anton Cantrell MD    BRIEF HISTORY:  The patient is a 23-year-old gentleman with history of atherosclerotic coronary artery disease, prior PCI and stenting of the right coronary artery in , who presents with frequent and recurring chest pain syndrome. He underwent recent stress testing demonstrating no evidence of ischemia. The patient is continuing to use nitroglycerin daily, returns back to the office with recurrent severe chest pain syndrome, referred for cardiac catheterization due to concerns for unstable angina. PREOPERATIVE DIAGNOSIS:  Unstable angina. POSTOPERATIVE DIAGNOSIS:  Noncardiac chest pain. PROCEDURES PERFORMED:  Bilateral selective coronary angiography. SURGEON:  Inez Hernandez MD    ASSISTANT:  None. COMPLICATIONS:  None. ANESTHESIA:  Conscious sedation. Start time is 10:26, end time 10:54. MEDICATIONS:  2 mg Versed, 50 mcg fentanyl    MONITORING RN:  Ramona Conner    SPECIMENS REMOVED:  None. IMPLANTS:  None. ESTIMATED BLOOD LOSS:  Less than 5 mL. PROCEDURE:  After informed consent, he was prepped and draped in usual sterile fashion. The right wrist was infiltrated with lidocaine. The right radial artery was accessed. A 6-Venezuelan sheath was advanced. A 6-Venezuelan Tiger catheter was utilized for left coronary artery injection. A 6-Venezuelan JR5 was utilized for right coronary artery injection. Despite extensive efforts, pigtails were not long enough due to extensive tortuosity of the brachiocephalic and a long 5-Venezuelan multipurpose was unable to be manipulated due to extensive tortuosity and the aortic valve was not crossed. We will recommend echocardiogram prior to leaving today. FINDINGS:  1.   Left ventricle:  Not done.  2.  Left main:  Left main is large. Bifurcates into LAD and circumflex systems. Appears angiographically normal.  3.  Left anterior descending coronary artery: It is a moderate-sized vessel. It gives rise to a fairly large very proximal diagonal.  The LAD diagonal system is angiographically normal.  4.  Left circumflex coronary artery: It is a moderate-sized vessel. It gives rise to a very high first obtuse marginal/ramus intermedius. It goes on and gives rise to a fairly large second obtuse marginal.  The entire system appears angiographically normal.  5.  Right coronary artery: It is a moderate-sized anatomically dominant vessel that has a stent in the proximal and midportion with less than 20% in-stent restenosis. The rest of the entire right coronary artery system appears angiographically normal.  It gives rise to a large posterior descending branch. Successful hemostasis with pneumatic radial band. CONCLUSIONS:  1. The patient with excessive tortuosity of right brachiocephalic in its distal takeoff making catheter manipulation difficult and unable to cross aortic valve due to lack of catheter length. I would recommend groin access in the future. Consideration could be given to left radial artery in the hopes for less tortuosity. 2.  Echocardiogram will be obtained to assess LV function and valvular pathology. 3.  Widely patent left main, LAD, left circumflex with no significant disease. 4.  Widely patent right coronary artery with less than 20% in-stent restenosis of the proximal/mid previously placed stent in 1999. RECOMMENDATIONS:  Considerations for noncardiac source of chest pain. Thank you for allowing us to participate in the care of this patient. For any questions or concerns, please feel free to contact me.       Genia Turner MD      [id number]  D:  06/05/2020 10:57  T:  06/05/2020 11:33  JOB #:  [id number]  CC:  MD Jaguar Johns. Raisa Mendes MD

## 2020-06-05 NOTE — PROCEDURES
Brief Cardiac Procedure Note    Patient: Andi Hoffman MRN: [id number]  SSN: [id number]    YOB: 1950  Age: 69 y.o. Sex: male      Date of Procedure: 6/5/2020     Pre-procedure Diagnosis: Atypical Angina    Post-procedure Diagnosis: Non-cardiac Chest Pain    Procedure: Left Heart Catheterization    Brief Description of Procedure: See note    Performed By: Pipo Vincent MD     Assistants: None    Anesthesia: Moderate Sedation    Estimated Blood Loss: Less than 10 mL      Specimens: None    Implants: None    Findings:     LM:  NML  LAD:  NML  LCx:  NML  RCA:  NML - widely patent stent    Right radial    Complications: None    Recommendations: Continue medical therapy.     Signed By: Pipo Vincent MD     June 5, 2020

## 2020-06-05 NOTE — PROGRESS NOTES
TRANSFER - OUT REPORT:    Verbal report given to cpru RN(name) on Mirella Venegas  being transferred to cpru(unit) for routine progression of care       Report consisted of patients Situation, Background, Assessment and   Recommendations(SBAR). Information from the following report(s) SBAR, Kardex, Procedure Summary and MAR was reviewed with the receiving nurse. Lines:   Peripheral IV 06/05/20 Right Antecubital (Active)        Opportunity for questions and clarification was provided.       Patient transported with:   Registered Nurse          [facility] with Dr. Aleida Romeo  Right radial access  No intervervention  Versed 2mg  Fentanyl 50 mcg  TR Band in place @ 1050 with 14ml in band

## 2020-06-05 NOTE — PROGRESS NOTES
Patient received to [address] [place] [address] room # 14  Ambulatory from [facility]. Patient scheduled for [facility] today with Dr Earl Castillo. Procedure reviewed & questions answered, voiced good understanding consent obtained & placed on chart. All medications and medical history reviewed. Will prep patient per orders. Patient & family updated on plan of care. The patient has a fraility score of 4-VULNERABLE, based on use of wheelchair and cane for ADLs. Pt took ASA 81mg x 4 at 0600.

## 2020-10-21 ENCOUNTER — HOSPITAL ENCOUNTER (OUTPATIENT)
Dept: LAB | Age: 70
Discharge: HOME | End: 2020-10-21

## 2020-10-26 ENCOUNTER — HOSPITAL ENCOUNTER (OUTPATIENT)
Dept: LAB | Age: 70
Discharge: HOME | End: 2020-10-26

## 2020-10-26 DIAGNOSIS — D50.0 IRON DEFICIENCY ANEMIA DUE TO CHRONIC BLOOD LOSS: ICD-10-CM

## 2020-10-26 LAB
ALBUMIN SERPL-MCNC: 3.6 G/DL (ref 3.2–4.6)
ALBUMIN/GLOB SERPL: 1 {RATIO} (ref 1.2–3.5)
ALP SERPL-CCNC: 64 U/L (ref 50–136)
ALT SERPL-CCNC: 30 U/L (ref 12–65)
ANION GAP SERPL CALC-SCNC: 5 MMOL/L (ref 7–16)
AST SERPL-CCNC: 37 U/L (ref 15–37)
BASOPHILS # BLD: 0.1 K/UL (ref 0–0.2)
BASOPHILS NFR BLD: 1 % (ref 0–2)
BILIRUB SERPL-MCNC: 0.5 MG/DL (ref 0.2–1.1)
BUN SERPL-MCNC: 11 MG/DL (ref 8–23)
CALCIUM SERPL-MCNC: 8.9 MG/DL (ref 8.3–10.4)
CHLORIDE SERPL-SCNC: 107 MMOL/L (ref 98–107)
CO2 SERPL-SCNC: 26 MMOL/L (ref 21–32)
CREAT SERPL-MCNC: 0.9 MG/DL (ref 0.8–1.5)
DIFFERENTIAL METHOD BLD: ABNORMAL
EOSINOPHIL # BLD: 0.2 K/UL (ref 0–0.8)
EOSINOPHIL NFR BLD: 3 % (ref 0.5–7.8)
ERYTHROCYTE [DISTWIDTH] IN BLOOD BY AUTOMATED COUNT: 14.7 % (ref 11.9–14.6)
FERRITIN SERPL-MCNC: 44 NG/ML (ref 8–388)
GLOBULIN SER CALC-MCNC: 3.7 G/DL (ref 2.3–3.5)
GLUCOSE SERPL-MCNC: 230 MG/DL (ref 65–100)
HCT VFR BLD AUTO: 41.5 % (ref 41.1–50.3)
HGB BLD-MCNC: 13.9 G/DL (ref 13.6–17.2)
IMM GRANULOCYTES # BLD AUTO: 0 K/UL (ref 0–0.5)
IMM GRANULOCYTES NFR BLD AUTO: 0 % (ref 0–5)
IRON SATN MFR SERPL: 30 %
IRON SERPL-MCNC: 107 UG/DL (ref 35–150)
LYMPHOCYTES # BLD: 0.5 K/UL (ref 0.5–4.6)
LYMPHOCYTES NFR BLD: 9 % (ref 13–44)
MCH RBC QN AUTO: 30 PG (ref 26.1–32.9)
MCHC RBC AUTO-ENTMCNC: 33.5 G/DL (ref 31.4–35)
MCV RBC AUTO: 89.4 FL (ref 79.6–97.8)
MONOCYTES # BLD: 0.6 K/UL (ref 0.1–1.3)
MONOCYTES NFR BLD: 10 % (ref 4–12)
NEUTS SEG # BLD: 4.4 K/UL (ref 1.7–8.2)
NEUTS SEG NFR BLD: 77 % (ref 43–78)
NRBC # BLD: 0 K/UL (ref 0–0.2)
PLATELET # BLD AUTO: 93 K/UL (ref 150–450)
PMV BLD AUTO: 11.2 FL (ref 9.4–12.3)
POTASSIUM SERPL-SCNC: 4.2 MMOL/L (ref 3.5–5.1)
PROT SERPL-MCNC: 7.3 G/DL (ref 6.3–8.2)
RBC # BLD AUTO: 4.64 M/UL (ref 4.23–5.67)
SODIUM SERPL-SCNC: 138 MMOL/L (ref 136–145)
TIBC SERPL-MCNC: 360 UG/DL (ref 250–450)
WBC # BLD AUTO: 5.8 K/UL (ref 4.3–11.1)

## 2020-10-26 PROCEDURE — 82728 ASSAY OF FERRITIN: CPT

## 2020-10-26 PROCEDURE — 83540 ASSAY OF IRON: CPT

## 2020-10-26 PROCEDURE — 36415 COLL VENOUS BLD VENIPUNCTURE: CPT

## 2020-10-26 PROCEDURE — 80053 COMPREHEN METABOLIC PANEL: CPT

## 2020-10-26 PROCEDURE — 85025 COMPLETE CBC W/AUTO DIFF WBC: CPT

## 2020-11-09 ENCOUNTER — HOSPITAL ENCOUNTER (OUTPATIENT)
Dept: GENERAL RADIOLOGY | Age: 70
Discharge: HOME | End: 2020-11-09
Payer: COMMERCIAL

## 2020-11-09 ENCOUNTER — HOSPITAL ENCOUNTER (OUTPATIENT)
Dept: LAB | Age: 70
Discharge: HOME | End: 2020-11-09
Payer: COMMERCIAL

## 2020-11-09 ENCOUNTER — HOSPITAL ENCOUNTER (OUTPATIENT)
Dept: CT IMAGING | Age: 70
Discharge: HOME | End: 2020-11-09
Attending: NURSE PRACTITIONER
Payer: COMMERCIAL

## 2020-11-09 DIAGNOSIS — R06.00 DYSPNEA, UNSPECIFIED TYPE: ICD-10-CM

## 2020-11-09 DIAGNOSIS — R06.09 DYSPNEA ON EXERTION: ICD-10-CM

## 2020-11-09 PROBLEM — Z87.891 PERSONAL HISTORY OF TOBACCO USE: Status: ACTIVE | Noted: 2020-11-09

## 2020-11-09 LAB
D DIMER PPP FEU-MCNC: 3.06 UG/ML(FEU)
TSH W FREE THYROID I,TSHELE: 0.92 UIU/ML (ref 0.36–3.74)

## 2020-11-09 PROCEDURE — 74011000258 HC RX REV CODE- 258: Performed by: NURSE PRACTITIONER

## 2020-11-09 PROCEDURE — 84443 ASSAY THYROID STIM HORMONE: CPT

## 2020-11-09 PROCEDURE — 71260 CT THORAX DX C+: CPT

## 2020-11-09 PROCEDURE — 71046 X-RAY EXAM CHEST 2 VIEWS: CPT

## 2020-11-09 PROCEDURE — 36415 COLL VENOUS BLD VENIPUNCTURE: CPT

## 2020-11-09 PROCEDURE — 74011000636 HC RX REV CODE- 636: Performed by: NURSE PRACTITIONER

## 2020-11-09 PROCEDURE — 85379 FIBRIN DEGRADATION QUANT: CPT

## 2020-11-09 RX ORDER — SODIUM CHLORIDE 0.9 % (FLUSH) 0.9 %
10 SYRINGE (ML) INJECTION
Status: COMPLETED | OUTPATIENT
Start: 2020-11-09 | End: 2020-11-09

## 2020-11-09 RX ADMIN — SODIUM CHLORIDE 100 ML: 900 INJECTION, SOLUTION INTRAVENOUS at 18:00

## 2020-11-09 RX ADMIN — IOPAMIDOL 100 ML: 755 INJECTION, SOLUTION INTRAVENOUS at 18:00

## 2020-11-09 RX ADMIN — Medication 10 ML: at 18:00

## 2020-11-09 NOTE — PROGRESS NOTES
Please let patient know that d dimer is critical--needs stat CT of chest with PE protocol today.   Thyroid panel is normal

## 2021-10-15 ENCOUNTER — TRANSCRIBE ORDER (OUTPATIENT)
Dept: REGISTRATION | Age: 71
End: 2021-10-15

## 2021-10-15 DIAGNOSIS — I81 PORTAL VEIN THROMBOSIS: ICD-10-CM

## 2021-10-15 DIAGNOSIS — K74.69 OTHER CIRRHOSIS OF LIVER (HCC): Primary | ICD-10-CM

## 2021-10-18 ENCOUNTER — HOSPITAL ENCOUNTER (OUTPATIENT)
Dept: CT IMAGING | Age: 71
Discharge: HOME | End: 2021-10-18
Attending: INTERNAL MEDICINE
Payer: MEDICARE

## 2021-10-18 DIAGNOSIS — K74.69 OTHER CIRRHOSIS OF LIVER (HCC): ICD-10-CM

## 2021-10-18 DIAGNOSIS — I81 PORTAL VEIN THROMBOSIS: ICD-10-CM

## 2021-10-18 LAB — CREAT BLD-MCNC: 0.76 MG/DL (ref 0.8–1.5)

## 2021-10-18 PROCEDURE — 82565 ASSAY OF CREATININE: CPT

## 2021-10-18 PROCEDURE — 74011000258 HC RX REV CODE- 258: Performed by: INTERNAL MEDICINE

## 2021-10-18 PROCEDURE — 74160 CT ABDOMEN W/CONTRAST: CPT

## 2021-10-18 PROCEDURE — 74011000636 HC RX REV CODE- 636: Performed by: INTERNAL MEDICINE

## 2021-10-18 RX ORDER — SODIUM CHLORIDE 0.9 % (FLUSH) 0.9 %
10 SYRINGE (ML) INJECTION
Status: COMPLETED | OUTPATIENT
Start: 2021-10-18 | End: 2021-10-18

## 2021-10-18 RX ADMIN — IOPAMIDOL 100 ML: 755 INJECTION, SOLUTION INTRAVENOUS at 15:13

## 2021-10-18 RX ADMIN — DIATRIZOATE MEGLUMINE AND DIATRIZOATE SODIUM 15 ML: 660; 100 LIQUID ORAL; RECTAL at 15:14

## 2021-10-18 RX ADMIN — Medication 10 ML: at 15:14

## 2021-10-18 RX ADMIN — SODIUM CHLORIDE 100 ML: 900 INJECTION, SOLUTION INTRAVENOUS at 15:14

## 2021-10-30 PROBLEM — E11.40 DIABETIC NEUROPATHY (HCC): Status: ACTIVE | Noted: 2021-10-30

## 2021-10-30 PROBLEM — J44.9 CHRONIC OBSTRUCTIVE PULMONARY DISEASE (HCC): Status: ACTIVE | Noted: 2021-10-30

## 2021-10-30 PROBLEM — I85.00 ESOPHAGEAL VARICES (HCC): Status: ACTIVE | Noted: 2021-10-30

## 2021-10-30 PROBLEM — G47.33 OBSTRUCTIVE SLEEP APNEA: Status: ACTIVE | Noted: 2021-10-30

## 2021-11-29 ENCOUNTER — HOSPITAL ENCOUNTER (EMERGENCY)
Age: 71
Discharge: HOME | End: 2021-11-29
Attending: EMERGENCY MEDICINE
Payer: MEDICARE

## 2021-11-29 ENCOUNTER — APPOINTMENT (OUTPATIENT)
Dept: CT IMAGING | Age: 71
End: 2021-11-29
Attending: EMERGENCY MEDICINE
Payer: MEDICARE

## 2021-11-29 VITALS
WEIGHT: 162 LBS | HEART RATE: 71 BPM | OXYGEN SATURATION: 96 % | SYSTOLIC BLOOD PRESSURE: 142 MMHG | TEMPERATURE: 97.6 F | DIASTOLIC BLOOD PRESSURE: 80 MMHG | RESPIRATION RATE: 20 BRPM | HEIGHT: 66 IN | BODY MASS INDEX: 26.03 KG/M2

## 2021-11-29 DIAGNOSIS — R11.2 NAUSEA AND VOMITING, INTRACTABILITY OF VOMITING NOT SPECIFIED, UNSPECIFIED VOMITING TYPE: ICD-10-CM

## 2021-11-29 DIAGNOSIS — R10.84 ABDOMINAL PAIN, GENERALIZED: Primary | ICD-10-CM

## 2021-11-29 DIAGNOSIS — E11.9 TYPE 2 DIABETES MELLITUS WITHOUT COMPLICATION, WITHOUT LONG-TERM CURRENT USE OF INSULIN (HCC): ICD-10-CM

## 2021-11-29 DIAGNOSIS — K74.60 HEPATIC CIRRHOSIS, UNSPECIFIED HEPATIC CIRRHOSIS TYPE, UNSPECIFIED WHETHER ASCITES PRESENT (HCC): ICD-10-CM

## 2021-11-29 LAB
ALBUMIN SERPL-MCNC: 3.2 G/DL (ref 3.2–4.6)
ALBUMIN/GLOB SERPL: 0.7 {RATIO} (ref 1.2–3.5)
ALP SERPL-CCNC: 79 U/L (ref 50–136)
ALT SERPL-CCNC: 32 U/L (ref 12–65)
ANION GAP SERPL CALC-SCNC: 3 MMOL/L (ref 7–16)
AST SERPL-CCNC: 73 U/L (ref 15–37)
BASOPHILS # BLD: 0.1 K/UL (ref 0–0.2)
BASOPHILS NFR BLD: 1 % (ref 0–2)
BILIRUB SERPL-MCNC: 0.8 MG/DL (ref 0.2–1.1)
BUN SERPL-MCNC: 10 MG/DL (ref 8–23)
CALCIUM SERPL-MCNC: 9.5 MG/DL (ref 8.3–10.4)
CHLORIDE SERPL-SCNC: 104 MMOL/L (ref 98–107)
CO2 SERPL-SCNC: 29 MMOL/L (ref 21–32)
CREAT SERPL-MCNC: 0.71 MG/DL (ref 0.8–1.5)
DIFFERENTIAL METHOD BLD: ABNORMAL
EOSINOPHIL # BLD: 0.1 K/UL (ref 0–0.8)
EOSINOPHIL NFR BLD: 2 % (ref 0.5–7.8)
ERYTHROCYTE [DISTWIDTH] IN BLOOD BY AUTOMATED COUNT: 14.8 % (ref 11.9–14.6)
GLOBULIN SER CALC-MCNC: 4.6 G/DL (ref 2.3–3.5)
GLUCOSE SERPL-MCNC: 132 MG/DL (ref 65–100)
HCT VFR BLD AUTO: 44 % (ref 41.1–50.3)
HGB BLD-MCNC: 14.6 G/DL (ref 13.6–17.2)
IMM GRANULOCYTES # BLD AUTO: 0 K/UL (ref 0–0.5)
IMM GRANULOCYTES NFR BLD AUTO: 0 % (ref 0–5)
LACTATE SERPL-SCNC: 1.4 MMOL/L (ref 0.4–2)
LIPASE SERPL-CCNC: 167 U/L (ref 73–393)
LYMPHOCYTES # BLD: 0.6 K/UL (ref 0.5–4.6)
LYMPHOCYTES NFR BLD: 11 % (ref 13–44)
MCH RBC QN AUTO: 29.1 PG (ref 26.1–32.9)
MCHC RBC AUTO-ENTMCNC: 33.2 G/DL (ref 31.4–35)
MCV RBC AUTO: 87.8 FL (ref 79.6–97.8)
MONOCYTES # BLD: 0.6 K/UL (ref 0.1–1.3)
MONOCYTES NFR BLD: 11 % (ref 4–12)
NEUTS SEG # BLD: 3.7 K/UL (ref 1.7–8.2)
NEUTS SEG NFR BLD: 75 % (ref 43–78)
NRBC # BLD: 0 K/UL (ref 0–0.2)
PLATELET # BLD AUTO: 90 K/UL (ref 150–450)
PLATELET COMMENTS,PCOM: ABNORMAL
PMV BLD AUTO: 11.2 FL (ref 9.4–12.3)
POTASSIUM SERPL-SCNC: 5.6 MMOL/L (ref 3.5–5.1)
PROT SERPL-MCNC: 7.8 G/DL (ref 6.3–8.2)
RBC # BLD AUTO: 5.01 M/UL (ref 4.23–5.6)
RBC MORPH BLD: ABNORMAL
SODIUM SERPL-SCNC: 136 MMOL/L (ref 138–145)
WBC # BLD AUTO: 5.1 K/UL (ref 4.3–11.1)
WBC MORPH BLD: ABNORMAL

## 2021-11-29 PROCEDURE — 85025 COMPLETE CBC W/AUTO DIFF WBC: CPT

## 2021-11-29 PROCEDURE — 74177 CT ABD & PELVIS W/CONTRAST: CPT

## 2021-11-29 PROCEDURE — 74011250636 HC RX REV CODE- 250/636: Performed by: EMERGENCY MEDICINE

## 2021-11-29 PROCEDURE — 83605 ASSAY OF LACTIC ACID: CPT

## 2021-11-29 PROCEDURE — 96361 HYDRATE IV INFUSION ADD-ON: CPT

## 2021-11-29 PROCEDURE — 83690 ASSAY OF LIPASE: CPT

## 2021-11-29 PROCEDURE — 96374 THER/PROPH/DIAG INJ IV PUSH: CPT

## 2021-11-29 PROCEDURE — 74011000636 HC RX REV CODE- 636: Performed by: EMERGENCY MEDICINE

## 2021-11-29 PROCEDURE — 81003 URINALYSIS AUTO W/O SCOPE: CPT

## 2021-11-29 PROCEDURE — 96375 TX/PRO/DX INJ NEW DRUG ADDON: CPT

## 2021-11-29 PROCEDURE — 74011000258 HC RX REV CODE- 258: Performed by: EMERGENCY MEDICINE

## 2021-11-29 PROCEDURE — 99285 EMERGENCY DEPT VISIT HI MDM: CPT

## 2021-11-29 PROCEDURE — 80053 COMPREHEN METABOLIC PANEL: CPT

## 2021-11-29 RX ORDER — MORPHINE SULFATE 4 MG/ML
4 INJECTION INTRAVENOUS
Status: COMPLETED | OUTPATIENT
Start: 2021-11-29 | End: 2021-11-29

## 2021-11-29 RX ORDER — SODIUM CHLORIDE 0.9 % (FLUSH) 0.9 %
5-10 SYRINGE (ML) INJECTION AS NEEDED
Status: DISCONTINUED | OUTPATIENT
Start: 2021-11-29 | End: 2021-11-29 | Stop reason: HOSPADM

## 2021-11-29 RX ORDER — PROMETHAZINE HYDROCHLORIDE 25 MG/1
25 TABLET ORAL
Qty: 15 TABLET | Refills: 0 | Status: SHIPPED | OUTPATIENT
Start: 2021-11-29 | End: 2021-12-07

## 2021-11-29 RX ORDER — SODIUM CHLORIDE 0.9 % (FLUSH) 0.9 %
10 SYRINGE (ML) INJECTION
Status: COMPLETED | OUTPATIENT
Start: 2021-11-29 | End: 2021-11-29

## 2021-11-29 RX ORDER — OXYCODONE AND ACETAMINOPHEN 5; 325 MG/1; MG/1
1 TABLET ORAL
Qty: 12 TABLET | Refills: 0 | Status: SHIPPED | OUTPATIENT
Start: 2021-11-29 | End: 2021-12-04

## 2021-11-29 RX ORDER — SODIUM CHLORIDE 0.9 % (FLUSH) 0.9 %
5-10 SYRINGE (ML) INJECTION EVERY 8 HOURS
Status: DISCONTINUED | OUTPATIENT
Start: 2021-11-29 | End: 2021-11-29 | Stop reason: HOSPADM

## 2021-11-29 RX ORDER — ONDANSETRON 2 MG/ML
4 INJECTION INTRAMUSCULAR; INTRAVENOUS
Status: COMPLETED | OUTPATIENT
Start: 2021-11-29 | End: 2021-11-29

## 2021-11-29 RX ADMIN — Medication 10 ML: at 06:12

## 2021-11-29 RX ADMIN — IOPAMIDOL 100 ML: 755 INJECTION, SOLUTION INTRAVENOUS at 06:12

## 2021-11-29 RX ADMIN — SODIUM CHLORIDE 100 ML: 900 INJECTION, SOLUTION INTRAVENOUS at 06:12

## 2021-11-29 RX ADMIN — MORPHINE SULFATE 4 MG: 4 INJECTION INTRAVENOUS at 05:42

## 2021-11-29 RX ADMIN — ONDANSETRON 4 MG: 2 INJECTION INTRAMUSCULAR; INTRAVENOUS at 05:41

## 2021-11-29 NOTE — DISCHARGE INSTRUCTIONS
Follow-up with your doctor, call the office to make an appointment. Return to the emergency department if your symptoms worsen despite the prescribed medicines.

## 2021-11-29 NOTE — ED NOTES
I have reviewed discharge instructions with the patient. The patient verbalized understanding. Patient left ED via Discharge Method: ambulatory to Home with wife. Opportunity for questions and clarification provided. Patient given 2 scripts. To continue your aftercare when you leave the hospital, you may receive an automated call from our care team to check in on how you are doing. This is a free service and part of our promise to provide the best care and service to meet your aftercare needs.  If you have questions, or wish to unsubscribe from this service please call [phone]. Thank you for Choosing our [facility] Emergency Department.

## 2021-11-29 NOTE — ED PROVIDER NOTES
Patient is a 76-year-old male with a history of coronary artery disease, idiopathic cirrhosis, type 2 diabetes, GERD, hypertension and hyperlipidemia who presents complaining of nausea vomiting with abdominal pain. This started Tuesday. His wife had similar symptoms which resolved. His abdominal pain has persisted. He denies any diarrhea. He describes the pain as diffuse achy nonradiating pain more prominent in his lower abdomen. He has been eating and drinking without difficulty. He denies any urinary symptoms, denies any fever.            Past Medical History:   Diagnosis Date    BPH (benign prostatic hyperplasia)     on the med flomax    CAD (coronary artery disease)     MI--stent x 1- 1999---- no problems since per pt-- followed by dr. Ivy Doherty Chronic back pain     Cirrhosis ([address] Dx 7/26/16    Diabetes mellitus type 2, controlled ([address] dx 2007    type 2-- sqbs am avg- 9-140-- hypo at 52's    GERD (gastroesophageal reflux disease)     controlled with med    Hearing reduced     Hypercholesterolemia     Hyperlipidemia     managed by  meds    Hypertension     controlled with med    Iron deficiency anemia     Esophogeal banding    Malignant hyperthermia due to anesthesia     1st cousin- on mom's side--- 30 yrs ago--per pt-- no one was tested in his family-REPORT TO DR Alonso Renae oldest sister has never had it with several surgeries\"    KAREN on CPAP     wears cpap at hs    Tinnitus     bilat    Varices, esophageal ([address]     several bandings per pt       Past Surgical History:   Procedure Laterality Date    COLONOSCOPY N/A 11/7/2019    COLONOSCOPY/BMI 25 [facility]ward [facility] at [facility]    [facility]X [facility] Right     pt has screws in r ankle    HX CERVICAL FUSION      ACDF multilevel    HX ENDOSCOPY      multi    HX HEENT      esophageal banding    HX LAP CHOLECYSTECTOMY  ~2010    HX LUMBAR LAMINECTOMY      HX OTHER SURGICAL Left     gangrene left leg-- wounded in [facility] ARTHROSCOPY Left     HX TONSILLECTOMY  as child    OH BREAST SURGERY PROCEDURE UNLISTED  age 14    muscles removed in chest age 14    [address] [place]    stent x1         Family History:   Problem Relation Age of Onset    Other Mother         liver disease secondary to hepatitis- from transfusion    Diabetes Mother     Liver Disease Mother     Heart Disease Father         pace maker    Diabetes Sister     Stroke Sister     No Known Problems Sister     No Known Problems Brother     No Known Problems Brother     No Known Problems Sister     Coronary Art Dis Other         fam hx       Social History     Socioeconomic History    Marital status:      Spouse name: Not on file    Number of children: Not on file    Years of education: Not on file    Highest education level: Not on file   Occupational History    Not on file   Tobacco Use    Smoking status: Former Smoker     Packs/day: 1.50     Years: 40.00     Pack years: 60.00     Types: Cigarettes     Quit date:      Years since quittin.9    Smokeless tobacco: Never Used   Substance and Sexual Activity    Alcohol use: Not Currently     Alcohol/week: 0.0 standard drinks     Comment: none    Drug use: No    Sexual activity: Not on file   Other Topics Concern    Not on file   Social History Narrative    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 Has 2 sons, 7 grandchildren. Has known exposure to agent orange. Is 100% disabled thru the [place] [facility].      Social Determinants of Health     Financial Resource Strain: Low Risk     Difficulty of Paying Living Expenses: Not hard at all   Food Insecurity: No Food Insecurity    Worried About Running Out of Food in the Last Year: Never true    Anthony of Food in the Last Year: Never true   Transportation Needs: No Transportation Needs    Lack of Transportation (Medical): No    Lack of Transportation (Non-Medical): No   Physical Activity:     Days of Exercise per Week: Not on file    Minutes of Exercise per Session: Not on file   Stress:     Feeling of Stress : Not on file   Social Connections:     Frequency of Communication with Friends and Family: Not on file    Frequency of Social Gatherings with Friends and Family: Not on file    Attends Shinto Services: Not on file    Active Member of [address] [place] [address] or Organizations: Not on file    Attends Club or Organization Meetings: Not on file    Marital Status: Not on file   Intimate Partner Violence:     Fear of Current or Ex-Partner: Not on file    Emotionally Abused: Not on file    Physically Abused: Not on file    Sexually Abused: Not on file   Housing Stability:     Unable to Pay for Housing in the Last Year: Not on file    Number of [place] in the Last Year: Not on file    Unstable Housing in the Last Year: Not on file         ALLERGIES: Metformin    Review of Systems   Constitutional: Negative for chills and fever. Gastrointestinal: Positive for abdominal pain. Negative for diarrhea, nausea and vomiting. All other systems reviewed and are negative. Vitals:    11/29/21 0358   BP: (!) 154/91   Pulse: 65   Resp: 20   Temp: 97.6 °F (36.4 °C)   SpO2: 98%   Weight: 73.5 kg (162 lb)   Height: 5' 6\" (1.676 m)            Physical Exam  Vitals and nursing note reviewed. Constitutional:       Appearance: Normal appearance. He is well-developed and normal weight. HENT:      Head: Normocephalic and atraumatic. Eyes:      Conjunctiva/sclera: Conjunctivae normal.      Pupils: Pupils are equal, round, and reactive to light. Cardiovascular:      Rate and Rhythm: Normal rate and regular rhythm. Pulmonary:      Effort: Pulmonary effort is normal. No respiratory distress. Breath sounds: No wheezing or rales. Abdominal:      Palpations: Abdomen is soft. Tenderness: There is abdominal tenderness. Comments: Diffuse lower abdominal tenderness to palpation as indicated. Abdomen is distended with normal bowel sounds   Musculoskeletal:         General: Normal range of motion. Cervical back: Normal range of motion and neck supple. Skin:     General: Skin is warm and dry. Neurological:      Mental Status: He is alert and oriented to person, place, and time. MDM  Number of Diagnoses or Management Options  Abdominal pain, generalized: new and requires workup  Hepatic cirrhosis, unspecified hepatic cirrhosis type, unspecified whether ascites present (HCC)  Nausea and vomiting, intractability of vomiting not specified, unspecified vomiting type  Type 2 diabetes mellitus without complication, without long-term current use of insulin ([facility]  Diagnosis management comments: 7:20 AM discussed results with patient and wife, unremarkable work-up. Repeat abdominal exam is benign and he appears comfortable. He has a primary doctor he can follow-up with.        Amount and/or Complexity of Data Reviewed  Clinical lab tests: ordered and reviewed  Tests in the radiology section of CPT®: ordered and reviewed    Risk of Complications, Morbidity, and/or Mortality  Presenting problems: moderate  Diagnostic procedures: moderate  Management options: moderate    Patient Progress  Patient progress: improved         Procedures

## 2021-11-29 NOTE — ED TRIAGE NOTES
This patient and several others at his [employer] had a stomach virus after a social function, which caused several days of N/V and decreased PO intake. He has not had as much vomiting in the past two days but his chronic back pain has worsened significantly and he has persistent abdominal pain with belching and more difficulty having bowel movements. Has a known hernia, no protrusion noted at triage. History of liver disease and difficulty retaining iron.

## 2021-11-29 NOTE — ED NOTES
Pt c/o abd pain and back pain. Pt states he has chronic back pain that now feels like muscle spasm that has been exacerbated by the vomiting he experienced related to his abd pain.

## 2021-12-18 ENCOUNTER — HOSPITAL ENCOUNTER (OUTPATIENT)
Dept: MRI IMAGING | Age: 71
Discharge: HOME | End: 2021-12-18
Attending: PHYSICIAN ASSISTANT
Payer: COMMERCIAL

## 2021-12-18 DIAGNOSIS — M51.36 DDD (DEGENERATIVE DISC DISEASE), LUMBAR: ICD-10-CM

## 2021-12-18 DIAGNOSIS — M47.816 FACET ARTHROPATHY, LUMBAR: ICD-10-CM

## 2021-12-18 PROCEDURE — 72148 MRI LUMBAR SPINE W/O DYE: CPT

## 2022-02-22 ENCOUNTER — TRANSCRIBE ORDER (OUTPATIENT)
Dept: SCHEDULING | Age: 72
End: 2022-02-22

## 2022-02-22 DIAGNOSIS — K74.69 OTHER CIRRHOSIS OF LIVER (HCC): Primary | ICD-10-CM

## 2022-02-22 DIAGNOSIS — Z87.39 HISTORY OF DEGENERATIVE DISC DISEASE: ICD-10-CM

## 2022-03-04 ENCOUNTER — HOSPITAL ENCOUNTER (OUTPATIENT)
Dept: CT IMAGING | Age: 72
Discharge: HOME | End: 2022-03-04
Attending: INTERNAL MEDICINE
Payer: COMMERCIAL

## 2022-03-04 DIAGNOSIS — K74.69 OTHER CIRRHOSIS OF LIVER (HCC): ICD-10-CM

## 2022-03-04 DIAGNOSIS — Z87.39 HISTORY OF DEGENERATIVE DISC DISEASE: ICD-10-CM

## 2022-03-04 LAB — CREAT BLD-MCNC: 0.76 MG/DL (ref 0.8–1.5)

## 2022-03-04 PROCEDURE — 82565 ASSAY OF CREATININE: CPT

## 2022-03-04 PROCEDURE — 74011000636 HC RX REV CODE- 636: Performed by: INTERNAL MEDICINE

## 2022-03-04 PROCEDURE — 74177 CT ABD & PELVIS W/CONTRAST: CPT

## 2022-03-04 PROCEDURE — 74011000258 HC RX REV CODE- 258: Performed by: INTERNAL MEDICINE

## 2022-03-04 RX ORDER — SODIUM CHLORIDE 0.9 % (FLUSH) 0.9 %
10 SYRINGE (ML) INJECTION
Status: COMPLETED | OUTPATIENT
Start: 2022-03-04 | End: 2022-03-04

## 2022-03-04 RX ADMIN — IOPAMIDOL 100 ML: 755 INJECTION, SOLUTION INTRAVENOUS at 09:37

## 2022-03-04 RX ADMIN — Medication 10 ML: at 09:38

## 2022-03-04 RX ADMIN — DIATRIZOATE MEGLUMINE AND DIATRIZOATE SODIUM 15 ML: 660; 100 LIQUID ORAL; RECTAL at 09:38

## 2022-03-04 RX ADMIN — SODIUM CHLORIDE 100 ML: 9 INJECTION, SOLUTION INTRAVENOUS at 09:38

## 2022-03-18 PROBLEM — Z87.891 PERSONAL HISTORY OF TOBACCO USE: Status: ACTIVE | Noted: 2020-11-09

## 2022-03-18 PROBLEM — E11.40 DIABETIC NEUROPATHY (HCC): Status: ACTIVE | Noted: 2021-10-30

## 2022-03-18 PROBLEM — G47.33 OBSTRUCTIVE SLEEP APNEA: Status: ACTIVE | Noted: 2021-10-30

## 2022-03-19 PROBLEM — I25.10 CORONARY ARTERY DISEASE DUE TO LIPID RICH PLAQUE: Status: ACTIVE | Noted: 2017-03-28

## 2022-03-19 PROBLEM — I25.83 CORONARY ARTERY DISEASE DUE TO LIPID RICH PLAQUE: Status: ACTIVE | Noted: 2017-03-28

## 2022-03-20 PROBLEM — J44.9 CHRONIC OBSTRUCTIVE PULMONARY DISEASE (HCC): Status: ACTIVE | Noted: 2021-10-30

## 2022-03-20 PROBLEM — I85.00 ESOPHAGEAL VARICES (HCC): Status: ACTIVE | Noted: 2021-10-30

## 2022-04-15 ENCOUNTER — HOSPITAL ENCOUNTER (EMERGENCY)
Age: 72
Discharge: HOME | End: 2022-04-15
Attending: STUDENT IN AN ORGANIZED HEALTH CARE EDUCATION/TRAINING PROGRAM
Payer: COMMERCIAL

## 2022-04-15 VITALS
OXYGEN SATURATION: 98 % | HEART RATE: 74 BPM | BODY MASS INDEX: 24.91 KG/M2 | DIASTOLIC BLOOD PRESSURE: 99 MMHG | HEIGHT: 66 IN | RESPIRATION RATE: 18 BRPM | TEMPERATURE: 97.9 F | SYSTOLIC BLOOD PRESSURE: 162 MMHG | WEIGHT: 155 LBS

## 2022-04-15 DIAGNOSIS — K91.840 SURGICAL WOUND HEMORRHAGE AFTER DENTAL PROCEDURE: Primary | ICD-10-CM

## 2022-04-15 DIAGNOSIS — Z98.818 SURGICAL WOUND HEMORRHAGE AFTER DENTAL PROCEDURE: Primary | ICD-10-CM

## 2022-04-15 LAB
ANION GAP SERPL CALC-SCNC: 5 MMOL/L (ref 7–16)
BASOPHILS # BLD: 0 K/UL (ref 0–0.2)
BASOPHILS NFR BLD: 1 % (ref 0–2)
BUN SERPL-MCNC: 12 MG/DL (ref 8–23)
CALCIUM SERPL-MCNC: 9.3 MG/DL (ref 8.3–10.4)
CHLORIDE SERPL-SCNC: 106 MMOL/L (ref 98–107)
CO2 SERPL-SCNC: 28 MMOL/L (ref 21–32)
CREAT SERPL-MCNC: 0.7 MG/DL (ref 0.8–1.5)
DIFFERENTIAL METHOD BLD: ABNORMAL
EOSINOPHIL # BLD: 0.1 K/UL (ref 0–0.8)
EOSINOPHIL NFR BLD: 2 % (ref 0.5–7.8)
ERYTHROCYTE [DISTWIDTH] IN BLOOD BY AUTOMATED COUNT: 15.9 % (ref 11.9–14.6)
GLUCOSE SERPL-MCNC: 137 MG/DL (ref 65–100)
HCT VFR BLD AUTO: 44.8 % (ref 41.1–50.3)
HGB BLD-MCNC: 14.9 G/DL (ref 13.6–17.2)
IMM GRANULOCYTES # BLD AUTO: 0 K/UL (ref 0–0.5)
IMM GRANULOCYTES NFR BLD AUTO: 0 % (ref 0–5)
LYMPHOCYTES # BLD: 0.4 K/UL (ref 0.5–4.6)
LYMPHOCYTES NFR BLD: 7 % (ref 13–44)
MCH RBC QN AUTO: 29.6 PG (ref 26.1–32.9)
MCHC RBC AUTO-ENTMCNC: 33.3 G/DL (ref 31.4–35)
MCV RBC AUTO: 89.1 FL (ref 79.6–97.8)
MONOCYTES # BLD: 0.8 K/UL (ref 0.1–1.3)
MONOCYTES NFR BLD: 13 % (ref 4–12)
NEUTS SEG # BLD: 4.8 K/UL (ref 1.7–8.2)
NEUTS SEG NFR BLD: 78 % (ref 43–78)
NRBC # BLD: 0 K/UL (ref 0–0.2)
PLATELET # BLD AUTO: 99 K/UL (ref 150–450)
PMV BLD AUTO: 10.4 FL (ref 9.4–12.3)
POTASSIUM SERPL-SCNC: 4 MMOL/L (ref 3.5–5.1)
RBC # BLD AUTO: 5.03 M/UL (ref 4.23–5.6)
SODIUM SERPL-SCNC: 139 MMOL/L (ref 136–145)
WBC # BLD AUTO: 6.2 K/UL (ref 4.3–11.1)

## 2022-04-15 PROCEDURE — 80048 BASIC METABOLIC PNL TOTAL CA: CPT

## 2022-04-15 PROCEDURE — 99283 EMERGENCY DEPT VISIT LOW MDM: CPT

## 2022-04-15 PROCEDURE — 74011000250 HC RX REV CODE- 250: Performed by: STUDENT IN AN ORGANIZED HEALTH CARE EDUCATION/TRAINING PROGRAM

## 2022-04-15 PROCEDURE — 85025 COMPLETE CBC W/AUTO DIFF WBC: CPT

## 2022-04-15 RX ORDER — TRANEXAMIC ACID 100 MG/ML
1 INJECTION, SOLUTION INTRAVENOUS ONCE
Status: COMPLETED | OUTPATIENT
Start: 2022-04-15 | End: 2022-04-15

## 2022-04-15 RX ADMIN — TRANEXAMIC ACID 1000 MG: 1 INJECTION, SOLUTION INTRAVENOUS at 22:30

## 2022-04-16 NOTE — DISCHARGE INSTRUCTIONS
Leave gauze in place for the next hour. Wash area well with cool water. Arrange follow-up with your primary dental provider.   Return for worsening symptoms, concerns or questions

## 2022-04-16 NOTE — ED NOTES
I have reviewed discharge instructions with the patient. The patient verbalized understanding. Patient left ED via Discharge Method: ambulatory to Home with (insert name of family/friend, self, transport home). Opportunity for questions and clarification provided. Patient given 0 scripts. To continue your aftercare when you leave the hospital, you may receive an automated call from our care team to check in on how you are doing. This is a free service and part of our promise to provide the best care and service to meet your aftercare needs.  If you have questions, or wish to unsubscribe from this service please call [phone]. Thank you for Choosing our [facility] Emergency Department.

## 2022-04-16 NOTE — ED PROVIDER NOTES
79-year-old male patient presents to this ER with reports of ongoing bleeding following a dental extraction procedure earlier today. Patient states he has a history of thrombocytopenia. He states bleeding continued after his procedure then stopped, and return later this evening. In the time his been waiting to be seen, bleeding is improved as well. He denies anticoagulation. He reports difficulty taking his prescribed painkillers secondary to history of liver disease stating he has been told not to take Tylenol. He was prescribed Tylenol 3.   Patient states he has other medication for pain that he uses for his back which she can use instead           Past Medical History:   Diagnosis Date    BPH (benign prostatic hyperplasia)     on the med flomax    CAD (coronary artery disease)     MI--stent x 1- 1999---- no problems since per pt-- followed by dr. Jane Kasper Chronic back pain     Cir[address] Dx 7/26/16    Diabetes mellitus type 2, controlled ([address] dx 2007    type 2-- sqbs am avg- 9-140-- hypo at 52's    GERD (gastroesophageal reflux disease)     controlled with med    Hearing reduced     Hypercholesterolemia     Hyperlipidemia     managed by  meds    Hypertension     controlled with med    Iron deficiency anemia     Esophogeal banding    Malignant hyperthermia due to anesthesia     1st cousin- on mom's side--- 30 yrs ago--per pt-- no one was tested in his family-REPORT TO DR Epps Foot oldest sister has never had it with several surgeries\"    KAREN on CPAP     wears cpap at hs    Tinnitus     bilat    Varices, esophageal ([address]     several bandings per pt       Past Surgical History:   Procedure Laterality Date    COLONOSCOPY N/A 11/7/2019    COLONOSCOPY/BMI 25 performed by [facility]man MD at [facility] ENDOSCOPY    [facility] Right     pt has screws in r ankle    HX CERVICAL FUSION      ACDF multilevel    HX ENDOSCOPY      multi    HX HEENT      esophageal banding  HX LAP CHOLECYSTECTOMY  ~    HX LUMBAR LAMINECTOMY      HX OTHER SURGICAL Left     gangrene left leg-- wounded in [facility]    HX SHOULDER ARTHROSCOPY Left     HX TONSILLECTOMY  as child    MD BREAST SURGERY PROCEDURE UNLISTED  age 14    muscles removed in chest age 14    [address] [place]    stent x1         Family History:   Problem Relation Age of Onset    Other Mother         liver disease secondary to hepatitis- from transfusion    Diabetes Mother     Liver Disease Mother     Heart Disease Father         pace maker    Diabetes Sister     Stroke Sister     No Known Problems Sister     No Known Problems Brother     No Known Problems Brother     No Known Problems Sister     Coronary Art Dis Other         fam hx       Social History     Socioeconomic History    Marital status:      Spouse name: Not on file    Number of children: Not on file    Years of education: Not on file    Highest education level: Not on file   Occupational History    Not on file   Tobacco Use    Smoking status: Former Smoker     Packs/day: 1.50     Years: 40.00     Pack years: 60.00     Types: Cigarettes     Quit date:      Years since quittin.3    Smokeless tobacco: Never Used   Substance and Sexual Activity    Alcohol use: Not Currently     Alcohol/week: 0.0 standard drinks     Comment: none    Drug use: No    Sexual activity: Not on file   Other Topics Concern    Not on file   Social History Narrative    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 Has 2 sons, 7 grandchildren. Has known exposure to agent orange. Is 100% disabled thru the [place] [facility].      Social Determinants of Health     Financial Resource Strain: Low Risk     Difficulty of Paying Living Expenses: Not hard at all   Food Insecurity: No Food Insecurity    Worried About Running Out of Food in the Last Year: Never true    [address] in the Last Year: Never true   Transportation Needs: No Transportation Needs    Lack of Transportation (Medical): No    Lack of Transportation (Non-Medical): No   Physical Activity:     Days of Exercise per Week: Not on file    Minutes of Exercise per Session: Not on file   Stress:     Feeling of Stress : Not on file   Social Connections:     Frequency of Communication with Friends and Family: Not on file    Frequency of Social Gatherings with Friends and Family: Not on file    Attends Muslim Services: Not on file    Active Member of [address] [place] [address] [employer] or Organizations: Not on file    Attends Club or Organization Meetings: Not on file    Marital Status: Not on file   Intimate Partner Violence:     Fear of Current or Ex-Partner: Not on file    Emotionally Abused: Not on file    Physically Abused: Not on file    Sexually Abused: Not on file   Housing Stability:     Unable to Pay for Housing in the Last Year: Not on file    Number of [place] in the Last Year: Not on file    Unstable Housing in the Last Year: Not on file         ALLERGIES: Metformin    Review of Systems   Skin: Positive for wound. Vitals:    04/15/22 2007   BP: (!) 162/99   Pulse: 74   Resp: 18   Temp: 97.9 °F (36.6 °C)   SpO2: 98%   Weight: 70.3 kg (155 lb)   Height: 5' 6\" (1.676 m)            Physical Exam  Vitals and nursing note reviewed. Constitutional:       Appearance: Normal appearance. HENT:      Head: Normocephalic and atraumatic. Nose: Nose normal.      Mouth/Throat:      Mouth: Mucous membranes are moist.   Eyes:      Extraocular Movements: Extraocular movements intact. Cardiovascular:      Rate and Rhythm: Normal rate. Pulses: Normal pulses. Pulmonary:      Effort: Pulmonary effort is normal.   Abdominal:      General: Abdomen is flat. Musculoskeletal:         General: Normal range of motion. Cervical back: Normal range of motion. Skin:     General: Skin is warm.       Capillary Refill: Capillary refill takes less than 2 seconds. Neurological:      General: No focal deficit present. Mental Status: He is alert. Psychiatric:         Mood and Affect: Mood normal.          MDM  Number of Diagnoses or Management Options  Surgical wound hemorrhage after dental procedure: new and requires workup  Diagnosis management comments: Patient was observed in this department without significant bleeding from the site. A TXA saturated gauze was placed at the wound site to help with bleeding. Advised patient to leave this gauze in place for the next hour and remove it prior to sleep. He will return if symptoms worsen. Voice dictation software was used during the making of this note. This software is not perfect and grammatical and other typographical errors may be present. This note has been proofread, but may still contain errors. [address] [facility]; 4/15/2022 @10:38 PM   ===================================================================           Amount and/or Complexity of Data Reviewed  Tests in the medicine section of CPT®: ordered and reviewed    Risk of Complications, Morbidity, and/or Mortality  Presenting problems: moderate  Diagnostic procedures: low  Management options: moderate    Patient Progress  Patient progress: stable    ED Course as of 04/15/22 2300   Fri Apr 15, 2022   2238 Patient is thrombocytopenic though not as significantly as noted in previous lab draws. No further bleeding in this department.  [BR]      ED Course User Index  [BR] Radha Craig DO       Procedures

## 2022-04-16 NOTE — ED TRIAGE NOTES
Pt presents to the ED c/o dental bleeding since this morning. States he had a h/o low platelet count and anemia, so when he could not stop the bleeding, he proceeded to check into the ED. States he is not on a blood thinner currently. Pt states having dizziness upon standing but denies any other complaints.

## 2022-05-25 ENCOUNTER — PREP FOR PROCEDURE (OUTPATIENT)
Dept: ADMINISTRATIVE | Age: 72
End: 2022-05-25

## 2022-05-26 NOTE — PRE-PROCEDURE INSTRUCTIONS
Patient verified name, , and procedure. Type: 1a; abbreviated assessment per anesthesia guidelines    Labs per anesthesia: POC Glucose DOS. Instructed pt that they will be notified the day before their procedure by the GI Lab for time of arrival if their procedure is [facility] and Pre-op for [place] cases. Arrival times should be called by 5 pm. If no phone is received the patient should contact their respective hospital. The GI lab telephone number is [phone] and ES Pre-op is [phone]. Follow diet and prep instructions per office including NPO status. If patient has NOT received instructions from office patient is advised to call surgeon office, verbalizes understanding. Bath or shower the night before and the am of surgery with non-mositurizing soap. No lotions, oils, powders, cologne on skin. No make up, eye make up or jewelry. Wear loose fitting comfortable, clean clothing. Must have adult present in building the entire time . Medications for the day of procedure isosorbide, Incruse Ellipta Inhaler, Gabapentin, Nadolol, Flonase nasal spray, Omeprazole patient to hold Jardiance, Novolog Insulin. Patient has been Iron pills for 2 days and will continue. Patient instructed to take 80% of lantus Insulin the night before the procedure, 18 units. Per anesthesia protocol: If you feel symptoms of low blood sugar while fasting, check level. If low, drink only 4 ounces of a clear, sugary liquid and call pre-op at [phone]. The following discharge instructions reviewed with patient: medication given during procedure may cause drowsiness for several hours, therefore, do not drive or operate machinery for remainder of the day. You may not drink alcohol on the day of your procedure, please resume regular diet and activity unless otherwise directed. You may experience abdominal distention for several hours that is relieved by the passage of gas.  Contact your physician if you have any of the following: fever or chills, severe abdominal pain or excessive amount of bleeding or a large amount when having a bowel movement.  Occasional specks of blood with bowel movement would not be unusual.

## 2022-05-27 ENCOUNTER — ANESTHESIA EVENT (OUTPATIENT)
Dept: ENDOSCOPY | Age: 72
End: 2022-05-27
Payer: MEDICARE

## 2022-05-31 ENCOUNTER — HOSPITAL ENCOUNTER (OUTPATIENT)
Age: 72
Setting detail: OUTPATIENT SURGERY
Discharge: HOME | End: 2022-05-31
Attending: INTERNAL MEDICINE | Admitting: INTERNAL MEDICINE
Payer: MEDICARE

## 2022-05-31 ENCOUNTER — ANESTHESIA (OUTPATIENT)
Dept: ENDOSCOPY | Age: 72
End: 2022-05-31
Payer: MEDICARE

## 2022-05-31 VITALS
WEIGHT: 148 LBS | HEART RATE: 60 BPM | DIASTOLIC BLOOD PRESSURE: 75 MMHG | RESPIRATION RATE: 16 BRPM | HEIGHT: 66 IN | TEMPERATURE: 97.7 F | OXYGEN SATURATION: 96 % | BODY MASS INDEX: 23.78 KG/M2 | SYSTOLIC BLOOD PRESSURE: 112 MMHG

## 2022-05-31 LAB
GLUCOSE BLD STRIP.AUTO-MCNC: 108 MG/DL (ref 65–100)
SERVICE CMNT-IMP: ABNORMAL

## 2022-05-31 PROCEDURE — 2580000003 HC RX 258: Performed by: ANESTHESIOLOGY

## 2022-05-31 PROCEDURE — 2500000003 HC RX 250 WO HCPCS: Performed by: STUDENT IN AN ORGANIZED HEALTH CARE EDUCATION/TRAINING PROGRAM

## 2022-05-31 PROCEDURE — 3700000001 HC ADD 15 MINUTES (ANESTHESIA): Performed by: INTERNAL MEDICINE

## 2022-05-31 PROCEDURE — 3609010600 HC COLONOSCOPY POLYPECTOMY SNARE/COLD BIOPSY: Performed by: INTERNAL MEDICINE

## 2022-05-31 PROCEDURE — 88312 SPECIAL STAINS GROUP 1: CPT

## 2022-05-31 PROCEDURE — 2709999900 HC NON-CHARGEABLE SUPPLY: Performed by: INTERNAL MEDICINE

## 2022-05-31 PROCEDURE — 3700000000 HC ANESTHESIA ATTENDED CARE: Performed by: INTERNAL MEDICINE

## 2022-05-31 PROCEDURE — 3609012400 HC EGD TRANSORAL BIOPSY SINGLE/MULTIPLE: Performed by: INTERNAL MEDICINE

## 2022-05-31 PROCEDURE — 2580000003 HC RX 258: Performed by: STUDENT IN AN ORGANIZED HEALTH CARE EDUCATION/TRAINING PROGRAM

## 2022-05-31 PROCEDURE — 88305 TISSUE EXAM BY PATHOLOGIST: CPT

## 2022-05-31 PROCEDURE — 7100000010 HC PHASE II RECOVERY - FIRST 15 MIN: Performed by: INTERNAL MEDICINE

## 2022-05-31 PROCEDURE — 7100000011 HC PHASE II RECOVERY - ADDTL 15 MIN: Performed by: INTERNAL MEDICINE

## 2022-05-31 PROCEDURE — 82962 GLUCOSE BLOOD TEST: CPT

## 2022-05-31 PROCEDURE — 6360000002 HC RX W HCPCS: Performed by: STUDENT IN AN ORGANIZED HEALTH CARE EDUCATION/TRAINING PROGRAM

## 2022-05-31 RX ORDER — EPHEDRINE SULFATE/0.9% NACL/PF 50 MG/5 ML
SYRINGE (ML) INTRAVENOUS PRN
Status: DISCONTINUED | OUTPATIENT
Start: 2022-05-31 | End: 2022-05-31 | Stop reason: SDUPTHER

## 2022-05-31 RX ORDER — ACETAMINOPHEN 500 MG
1000 TABLET ORAL ONCE
Status: DISCONTINUED | OUTPATIENT
Start: 2022-05-31 | End: 2022-05-31 | Stop reason: HOSPADM

## 2022-05-31 RX ORDER — OXYCODONE HYDROCHLORIDE 5 MG/1
5 TABLET ORAL PRN
Status: DISCONTINUED | OUTPATIENT
Start: 2022-05-31 | End: 2022-05-31 | Stop reason: HOSPADM

## 2022-05-31 RX ORDER — FENTANYL CITRATE 50 UG/ML
100 INJECTION, SOLUTION INTRAMUSCULAR; INTRAVENOUS
Status: DISCONTINUED | OUTPATIENT
Start: 2022-05-31 | End: 2022-05-31 | Stop reason: HOSPADM

## 2022-05-31 RX ORDER — DIPHENHYDRAMINE HYDROCHLORIDE 50 MG/ML
12.5 INJECTION INTRAMUSCULAR; INTRAVENOUS
Status: DISCONTINUED | OUTPATIENT
Start: 2022-05-31 | End: 2022-05-31 | Stop reason: HOSPADM

## 2022-05-31 RX ORDER — SODIUM CHLORIDE, SODIUM LACTATE, POTASSIUM CHLORIDE, CALCIUM CHLORIDE 600; 310; 30; 20 MG/100ML; MG/100ML; MG/100ML; MG/100ML
INJECTION, SOLUTION INTRAVENOUS CONTINUOUS
Status: DISCONTINUED | OUTPATIENT
Start: 2022-05-31 | End: 2022-05-31 | Stop reason: HOSPADM

## 2022-05-31 RX ORDER — SCOLOPAMINE TRANSDERMAL SYSTEM 1 MG/1
1 PATCH, EXTENDED RELEASE TRANSDERMAL
Status: DISCONTINUED | OUTPATIENT
Start: 2022-05-31 | End: 2022-05-31 | Stop reason: HOSPADM

## 2022-05-31 RX ORDER — PROPOFOL 10 MG/ML
INJECTION, EMULSION INTRAVENOUS CONTINUOUS PRN
Status: DISCONTINUED | OUTPATIENT
Start: 2022-05-31 | End: 2022-05-31 | Stop reason: SDUPTHER

## 2022-05-31 RX ORDER — ONDANSETRON 2 MG/ML
4 INJECTION INTRAMUSCULAR; INTRAVENOUS
Status: DISCONTINUED | OUTPATIENT
Start: 2022-05-31 | End: 2022-05-31 | Stop reason: HOSPADM

## 2022-05-31 RX ORDER — LIDOCAINE HYDROCHLORIDE 20 MG/ML
INJECTION, SOLUTION EPIDURAL; INFILTRATION; INTRACAUDAL; PERINEURAL PRN
Status: DISCONTINUED | OUTPATIENT
Start: 2022-05-31 | End: 2022-05-31 | Stop reason: SDUPTHER

## 2022-05-31 RX ORDER — PROPOFOL 10 MG/ML
INJECTION, EMULSION INTRAVENOUS PRN
Status: DISCONTINUED | OUTPATIENT
Start: 2022-05-31 | End: 2022-05-31 | Stop reason: SDUPTHER

## 2022-05-31 RX ORDER — FENTANYL CITRATE 50 UG/ML
25 INJECTION, SOLUTION INTRAMUSCULAR; INTRAVENOUS EVERY 5 MIN PRN
Status: DISCONTINUED | OUTPATIENT
Start: 2022-05-31 | End: 2022-05-31 | Stop reason: HOSPADM

## 2022-05-31 RX ORDER — SODIUM CHLORIDE 0.9 % (FLUSH) 0.9 %
5-40 SYRINGE (ML) INJECTION PRN
Status: DISCONTINUED | OUTPATIENT
Start: 2022-05-31 | End: 2022-05-31 | Stop reason: HOSPADM

## 2022-05-31 RX ORDER — HYDROMORPHONE HYDROCHLORIDE 2 MG/ML
0.5 INJECTION, SOLUTION INTRAMUSCULAR; INTRAVENOUS; SUBCUTANEOUS EVERY 10 MIN PRN
Status: DISCONTINUED | OUTPATIENT
Start: 2022-05-31 | End: 2022-05-31 | Stop reason: HOSPADM

## 2022-05-31 RX ORDER — SODIUM CHLORIDE 0.9 % (FLUSH) 0.9 %
5-40 SYRINGE (ML) INJECTION EVERY 12 HOURS SCHEDULED
Status: DISCONTINUED | OUTPATIENT
Start: 2022-05-31 | End: 2022-05-31 | Stop reason: HOSPADM

## 2022-05-31 RX ORDER — OXYCODONE HYDROCHLORIDE 5 MG/1
10 TABLET ORAL PRN
Status: DISCONTINUED | OUTPATIENT
Start: 2022-05-31 | End: 2022-05-31 | Stop reason: HOSPADM

## 2022-05-31 RX ORDER — MIDAZOLAM HYDROCHLORIDE 2 MG/2ML
2 INJECTION, SOLUTION INTRAMUSCULAR; INTRAVENOUS
Status: DISCONTINUED | OUTPATIENT
Start: 2022-05-31 | End: 2022-05-31 | Stop reason: HOSPADM

## 2022-05-31 RX ADMIN — PROPOFOL 50 MG: 10 INJECTION, EMULSION INTRAVENOUS at 10:57

## 2022-05-31 RX ADMIN — LIDOCAINE HYDROCHLORIDE 40 MG: 20 INJECTION, SOLUTION EPIDURAL; INFILTRATION; INTRACAUDAL; PERINEURAL at 10:57

## 2022-05-31 RX ADMIN — PROPOFOL 140 MCG/KG/MIN: 10 INJECTION, EMULSION INTRAVENOUS at 10:58

## 2022-05-31 RX ADMIN — PHENYLEPHRINE HYDROCHLORIDE 2 ML: 10 INJECTION INTRAVENOUS at 11:07

## 2022-05-31 RX ADMIN — PHENYLEPHRINE HYDROCHLORIDE 1 ML: 10 INJECTION INTRAVENOUS at 11:02

## 2022-05-31 RX ADMIN — Medication 10 MG: at 11:09

## 2022-05-31 RX ADMIN — SODIUM CHLORIDE, SODIUM LACTATE, POTASSIUM CHLORIDE, AND CALCIUM CHLORIDE: 600; 310; 30; 20 INJECTION, SOLUTION INTRAVENOUS at 10:17

## 2022-05-31 ASSESSMENT — PAIN - FUNCTIONAL ASSESSMENT: PAIN_FUNCTIONAL_ASSESSMENT: NONE - DENIES PAIN

## 2022-05-31 ASSESSMENT — COPD QUESTIONNAIRES: CAT_SEVERITY: MODERATE

## 2022-05-31 NOTE — H&P
[facility] Outpatient History and Physical Note         [facility] Outpatient History and Physical    Patient: Nhi Whitten    Physician: Kimo Cruz MD    Chief Complaint: cirrhosis, hx of of polyps    History of Present Illness: 67 yr old male with hx of small varices, gastric polyps, colon polyps    Justification for Procedure: cancer prevention    PMH:   Past Medical History:   Diagnosis Date    BPH (benign prostatic hyperplasia)     on the med flomax    CAD (coronary artery disease)     MI--stent x 1- 1999---- no problems since per pt-- followed by dr. Catarina Roca Chronic back pain     Cirrhosis ([address] Dx 7/26/16    COPD (chronic obstructive pulmonary disease) ([address]     Diabetes mellitus ([address]     type 2, BS     GERD (gastroesophageal reflux disease)     controlled with med    Hearing reduced     Hypercholesterolemia     Hyperlipidemia     managed by  meds    Hypertension     controlled with med    Iron deficiency anemia     Esophogeal banding    Iron deficiency anemia     Malignant hyperthermia due to anesthesia     1st cousin- on mom's side--- 30 yrs ago--per pt-- no one was tested in his family-REPORT TO DR Grace Crowe oldest sister has never had it with several surgeries\"    JERILYN on CPAP     wears cpap at hs    Thrombocytopenia (HCC)     Tinnitus     bilat    Varices, esophageal ([address]     several bandings per pt     PSH: is required. Please contact your  to configure this [address] Allergies: Allergies   Allergen Reactions    Metformin Diarrhea     Prior to Admission medications    Medication Sig Start Date End Date Taking?  Authorizing Provider   CPAP Machine MISC by Does not apply route   Yes Historical Provider, MD   ascorbic acid (VITAMIN C) 500 MG tablet Take 250 mg by mouth 3 times daily     Ar Automatic Reconciliation   aspirin 81 MG EC tablet Take 81 mg by mouth at bedtime     Ar Automatic Signs: Blood pressure 127/74, pulse 60, temperature 97.9 °F (36.6 °C), temperature source Oral, resp. rate 16, height 5' 6\" (1.676 m), weight 148 lb (67.1 kg), SpO2 97 %.   Gen: Alert and oriented  Lungs: Clear  Heart: RRR w/o m,g,r  Abdomen: Benign    Marleen Flank, MD  [facility]

## 2022-05-31 NOTE — PROCEDURES
COLONOSCOPY    DATE of PROCEDURE: 5/31/2022    MEDICATION:  MAC      INSTRUMENT: PCF    PROCEDURE: After obtaining informed consent, the patient was placed in the left lateral position and sedated. The endoscope was advanced to the cecum where the appendiceal orifice and ileocecal valve were identified. The scope was advanced to terminal ileum for 10cm. On withdrawal, the colon was carefully visualized in a 360 degree fashion. Retroflexion was performed in the rectum. The patient was taken to the recovery area in stable condition. FINDINGS:  Rectum: Internal hemorrhoids. There is possibility of rectal varices as well. Sigmoid: diverticulosis. Diffuse colopathy associated with cirrhosis noted, worse in the right colon over left  Descending Colon: Small 3mm polyp removed with cold forceps  Transverse Colon: normal  Ascending Colon: Diffuse colopathy associated with cirrhosis noted, worse in the right colon over left. Small 4mm polyp removed with cold forceps. Retroflexion noted in the right colon. Cecum: normal  Terminal ileum: normal    Estimated blood loss: 0-minimal       ASSESSMENT/PLAN:  1. F/up pathology  2.  Next colo pending health      Isrrael Steven MD  [facility], [id number]

## 2022-05-31 NOTE — ANESTHESIA PRE PROCEDURE
Department of Anesthesiology  Preprocedure Note       Name:  Tiffany Soto   Age:  67 y.o.  :  1950                                          MRN:  [id number]         Date:  2022      Surgeon: Michelle Nuñez):  Jaxon Krueger MD    Procedure: Procedure(s):  EGD ESOPHAGOGASTRODUODENOSCOPY  COLORECTAL CANCER SCREENING, NOT HIGH RISK    Medications prior to admission:   Prior to Admission medications    Medication Sig Start Date End Date Taking? Authorizing Provider   CPAP Machine MISC by Does not apply route   Yes Historical Provider, MD   ascorbic acid (VITAMIN C) 500 MG tablet Take 250 mg by mouth 3 times daily     Ar Automatic Reconciliation   aspirin 81 MG EC tablet Take 81 mg by mouth at bedtime     Ar Automatic Reconciliation   atorvastatin (LIPITOR) 20 MG tablet Take 10 mg by mouth at bedtime     Ar Automatic Reconciliation   vitamin D3 (CHOLECALCIFEROL) 125 MCG (5000 UT) TABS tablet Take 5,000 Units by mouth daily    Ar Automatic Reconciliation   glucose 4g chewable tablet Take 15 g by mouth as needed    Ar Automatic Reconciliation   empagliflozin (JARDIANCE) 25 MG tablet Take 25 mg by mouth daily    Ar Automatic Reconciliation   ferrous sulfate (FE TABS 325) 325 (65 Fe) MG EC tablet Take 325 mg by mouth 2 times daily (with meals) 17   Ar Automatic Reconciliation   fluticasone (FLONASE) 50 MCG/ACT nasal spray 2 sprays by Nasal route daily    Ar Automatic Reconciliation   gabapentin (NEURONTIN) 100 MG capsule Take by mouth 3 times daily.     Ar Automatic Reconciliation   insulin aspart (NOVOLOG FLEXPEN) 100 UNIT/ML injection pen Inject 4 Units into the skin 3 times daily (before meals) If BS >120 and 150, if anything 150 \"double up\"    Ar Automatic Reconciliation   insulin glargine (LANTUS) 100 UNIT/ML injection vial Inject 22 Units into the skin nightly  17   Ar Automatic Reconciliation   isosorbide mononitrate (IMDUR) 60 MG extended release tablet Take 30 mg by mouth daily     Ar Automatic Two patient identifiers verified. Pt notified to wait in clinic for 15 minutes after receiving injection, pt verbalized understanding. 150 mg of Depo-Provera administered IM to patient left ventrogluteal. Pt tolerated well. Next injection scheduled for 06/26/18. Pt verbalized understanding.   Reconciliation   nadolol (CORGARD) 40 MG tablet daily TAKE ONE TABLET BY MOUTH DAILY --FOR HEART/BLOOD PRESSURE 4/12/21   Ar Automatic Reconciliation   nitroGLYCERIN (NITROSTAT) 0.4 MG SL tablet Place under the tongue    Ar Automatic Reconciliation   omeprazole (PRILOSEC) 40 MG delayed release capsule Take 40 mg by mouth daily     Ar Automatic Reconciliation   sucralfate (CARAFATE) 1 GM tablet Take 1 g by mouth 2 times daily as needed     Ar Automatic Reconciliation   tamsulosin (FLOMAX) 0.4 MG capsule Take 0.4 mg by mouth at bedtime     Ar Automatic Reconciliation   Umeclidinium Bromide (INCRUSE ELLIPTA) 62.5 MCG/INH AEPB Inhale 1 puff into the lungs daily 4/22/21   Ar Automatic Reconciliation       Current medications:    Current Facility-Administered Medications   Medication Dose Route Frequency Provider Last Rate Last Admin    lactated ringers infusion   IntraVENous Continuous Brandon Jean Baptiste MD        fentaNYL (SUBLIMAZE) injection 25 mcg  25 mcg IntraVENous Q5 Min PRN Brandon Jean Baptiste MD        HYDROmorphone HCl PF (DILAUDID) injection 0.5 mg  0.5 mg IntraVENous Q10 Min PRN Brandon Jean Baptiste MD        oxyCODONE (ROXICODONE) immediate release tablet 5 mg  5 mg Oral PRN Brandon Jean Baptiste MD        [facility] oxyCODONE (ROXICODONE) immediate release tablet 10 mg  10 mg Oral PRN Brandon Jean Baptiste MD        ondansetron TELE[facility]) injection 4 mg  4 mg IntraVENous Once PRN Brandon Jean Baptiste MD        diphenhydrAMINE (BENADRYL) injection 12.5 mg  12.5 mg IntraVENous Once PRN Brandon Jean Baptiste MD        acetaminophen (TYLENOL) tablet 1,000 mg  1,000 mg Oral Once Brandon Jean Baptiste MD        fentaNYL (SUBLIMAZE) injection 100 mcg  100 mcg IntraVENous Once PRN Brandon Jean Baptiste MD        scopolamine (TRANSDERM-SCOP) transdermal patch 1 patch  1 patch TransDERmal Q72H Brandon Jean Baptiste MD        lactated ringers infusion   IntraVENous Continuous Brandon Jean Baptiste MD        sodium chloride flush 0.9 % injection 5-40 mL  5-40 mL IntraVENous 2 times per day Brandon Jean Baptiste MD        sodium chloride flush 0.9 % injection 5-40 mL  5-40 mL IntraVENous PRN Brandon Jean Baptiste MD        midazolam PF (VERSED) injection 2 mg  2 mg IntraVENous Once PRN Brandon Jean Baptiste MD           Allergies:     Allergies   Allergen Reactions    Metformin Diarrhea       Problem List:    Patient Active Problem List   Diagnosis Code    Lower GI bleed K92.2    S/P PTCA (percutaneous transluminal coronary angioplasty) Z98.61    Iron deficiency anemia D50.9    Personal history of tobacco use Z87.891    Obstructive sleep apnea G47.33    BPH (benign prostatic hyperplasia) N40.0    Diabetes mellitus type 2, controlled ([facility] E11.9    Biliary cirrhosis (HCC) K74.5    Mononeuritis G58.9    Diabetic neuropathy (HCC) E11.40    GERD without esophagitis K21.9    Hyperlipidemia E78.5    Intervertebral disc disorder with myelopathy, cervical region M50.00    Degeneration of lumbar or lumbosacral intervertebral disc M51.37    Chronic back pain M54.9, G89.29    GIB (gastrointestinal bleeding) K92.2    Coronary artery disease due to lipid rich plaque I25.10, I25.83    Chronic obstructive pulmonary disease (HCC) J44.9    Esophageal varices (HCC) I85.00    Essential hypertension I10       Past Medical History:        Diagnosis Date    BPH (benign prostatic hyperplasia)     on the med flomax    CAD (coronary artery disease)     MI--stent x 1- 1999---- no problems since per pt-- followed by dr. Geraldo Gonzalez    Chronic back pain     Cirrhosis ([facility] Dx 7/26/16    COPD (chronic obstructive pulmonary disease) ([facility]     Diabetes mellitus ([facility]     type 2, BS     GERD (gastroesophageal reflux disease)     controlled with med    Hearing reduced     Hypercholesterolemia     Hyperlipidemia     managed by  meds    Hypertension     controlled with med    Iron deficiency anemia     Esophogeal banding    Iron deficiency anemia     Malignant hyperthermia due to anesthesia     1st cousin- on mom's side--- 30 yrs ago--per pt-- no one was tested in his family-REPORT TO DR Hernandez Dear- \"My oldest sister has never had it with several surgeries\"    JERILYN on CPAP     wears cpap at hs    Thrombocytopenia (HCC)     Tinnitus     bilat    Varices, esophageal (HCC)     several bandings per pt       Past Surgical History:        Procedure Laterality Date    ANKLE FRACTURE SURGERY Right     pt has screws in r ankle    BREAST SURGERY  age 14    muscles removed in chest age 14    Meredith Me CERVICAL FUSION      ACDF multilevel    CHOLECYSTECTOMY, LAPAROSCOPIC          COLONOSCOPY N/A 2019    COLONOSCOPY/BMI 25 performed by Andres Pham MD at [facility]      esophageal banding    LUMBAR LAMINECTOMY      OTHER SURGICAL HISTORY Left     gangrene left leg-- wounded in [address]    stent x1    SHOULDER ARTHROSCOPY Left     TONSILLECTOMY  as child    UPPER GASTROINTESTINAL ENDOSCOPY      multi       Social History:    Social History     Tobacco Use    Smoking status: Former Smoker     Packs/day: 1.50     Quit date: 1999     Years since quittin.4    Smokeless tobacco: Never Used   Substance Use Topics    Alcohol use: Not Currently     Alcohol/week: 0.0 standard drinks                                Counseling given: Not Answered      Vital Signs (Current):   Vitals:    22 1534 22 1004   BP:  127/74   Pulse:  60   Resp:  16   Temp:  97.9 °F (36.6 °C)   TempSrc:  Oral   SpO2:  97%   Weight: 149 lb (67.6 kg) 148 lb (67.1 kg)   Height: 5' 6\" (1.676 m) 5' 6\" (1.676 m)                                              BP Readings from Last 3 Encounters:   22 127/74   22 110/60   22 134/82       NPO Status:                                                                                 BMI:   Wt Readings from Last 3 Encounters:   22 148 lb (67.1 kg)   22 149 lb (67.6 kg) 04/26/22 149 lb 12.8 oz (67.9 kg)     Body mass index is 23.89 kg/m². CBC:   Lab Results   Component Value Date    WBC 6.2 04/15/2022    RBC 5.03 04/15/2022    HGB 14.9 04/15/2022    HCT 44.8 04/15/2022    MCV 89.1 04/15/2022    RDW 15.9 04/15/2022    PLT 99 04/15/2022       CMP:   Lab Results   Component Value Date     04/15/2022    K 4.0 04/15/2022     04/15/2022    CO2 28 04/15/2022    BUN 12 04/15/2022    CREATININE 0.70 04/15/2022    GFRAA >60 04/15/2022    AGRATIO 0.7 11/29/2021    LABGLOM >60 03/04/2022    GLUCOSE 137 04/15/2022    PROT 7.8 11/29/2021    CALCIUM 9.3 04/15/2022    BILITOT 0.8 11/29/2021    ALKPHOS 79 11/29/2021    AST 73 11/29/2021    ALT 32 11/29/2021       POC Tests: No results for input(s): POCGLU, POCNA, POCK, POCCL, POCBUN, POCHEMO, POCHCT in the last 72 hours.     Coags:   Lab Results   Component Value Date    PROTIME 14.5 06/05/2020    INR 1.1 06/05/2020       HCG (If Applicable): No results found for: PREGTESTUR, PREGSERUM, HCG, HCGQUANT     ABGs: No results found for: PHART, PO2ART, AVQ1RJO, JLN0ZNH, BEART, L8LGCZAY     Type & Screen (If Applicable):  No results found for: LABABO, LABRH    Drug/Infectious Status (If Applicable):  No results found for: HIV, HEPCAB    COVID-19 Screening (If Applicable):   Lab Results   Component Value Date    COVID19 Not Detected 11/23/2020           Anesthesia Evaluation  Patient summary reviewed and Nursing notes reviewed  Airway: Mallampati: II  TM distance: >3 FB   Neck ROM: full     Dental:    (+) upper dentures and lower dentures      Pulmonary:Negative Pulmonary ROS and normal exam    (+) COPD: moderate,  sleep apnea:                             Cardiovascular:  Exercise tolerance: good (>4 METS),   (+) hypertension:, CAD: no interval change, CABG/stent:,         Rhythm: regular  Rate: normal                    Neuro/Psych:   Negative Neuro/Psych ROS              GI/Hepatic/Renal: Neg GI/Hepatic/Renal ROS  (+) GERD:, liver disease: esophageal varices,           Endo/Other: Negative Endo/Other ROS   (+) DiabetesType II DM, using insulin, . Pt had no PAT visit       Abdominal:             Vascular: negative vascular ROS. Other Findings:           Anesthesia Plan      TIVA     ASA 3       Induction: intravenous. Anesthetic plan and risks discussed with patient.       Plan discussed with surgical team.                    Rosey Sky MD   5/31/2022

## 2022-05-31 NOTE — ANESTHESIA POSTPROCEDURE EVALUATION
Department of Anesthesiology  Postprocedure Note    Patient: Nhi Whitten  MRN: [id number]  YOB: 1950  Date of evaluation: 5/31/2022  Time:  12:06 PM     Procedure Summary     Date: 05/31/22 Room / Location: [facility] ENDO 03 / [facility] ENDOSCOPY    Anesthesia Start: 1053 Anesthesia Stop: 8075    Procedures:       EGD BIOPSY (N/A Upper GI Region)      COLONOSCOPY POLYPECTOMY SNARE/COLD BIOPSY (N/A Lower GI Region) Diagnosis:       Other cirrhosis of liver (HCC)      (OTHER CIRRHOSIS OF LIVER)      (ESOPHAGEAL VARICES WITHOUT BLEEDING)    Surgeons: Kimo Cruz MD Responsible Provider: Kalyn Grande MD    Anesthesia Type: MAC ASA Status: 3          Anesthesia Type: MAC    Pita Phase I: Pita Score: 10    Pita Phase II: Pita Score: 8    Last vitals: Reviewed and per EMR flowsheets.        Anesthesia Post Evaluation    Patient location during evaluation: PACU  Patient participation: complete - patient participated  Level of consciousness: awake and awake and alert  Airway patency: patent  Nausea & Vomiting: no nausea  Complications: no  Cardiovascular status: hemodynamically stable  Hydration status: euvolemic

## 2022-06-01 NOTE — PROGRESS NOTES
RN completed post op follow up call. Pt asked about restarting prophylaxis Aspirin. RN advised Pt to restart aspirin. Pt denies pain, questions or other concerns. RN encouraged Pt to call [facility] with any other issues.

## 2022-06-21 RX ORDER — SODIUM CHLORIDE 0.9 % (FLUSH) 0.9 %
5-40 SYRINGE (ML) INJECTION PRN
Status: CANCELLED | OUTPATIENT
Start: 2022-06-21

## 2022-06-21 RX ORDER — SODIUM CHLORIDE 9 MG/ML
INJECTION, SOLUTION INTRAVENOUS PRN
Status: CANCELLED | OUTPATIENT
Start: 2022-06-21

## 2022-06-21 RX ORDER — SODIUM CHLORIDE 0.9 % (FLUSH) 0.9 %
5-40 SYRINGE (ML) INJECTION EVERY 12 HOURS SCHEDULED
Status: CANCELLED | OUTPATIENT
Start: 2022-06-21

## 2022-07-12 ENCOUNTER — OFFICE VISIT (OUTPATIENT)
Dept: CARDIOLOGY CLINIC | Age: 72
End: 2022-07-12
Payer: OTHER GOVERNMENT

## 2022-07-12 VITALS
BODY MASS INDEX: 24.33 KG/M2 | DIASTOLIC BLOOD PRESSURE: 66 MMHG | HEART RATE: 60 BPM | WEIGHT: 151.4 LBS | HEIGHT: 66 IN | SYSTOLIC BLOOD PRESSURE: 120 MMHG

## 2022-07-12 DIAGNOSIS — I25.83 CORONARY ARTERY DISEASE DUE TO LIPID RICH PLAQUE: Primary | ICD-10-CM

## 2022-07-12 DIAGNOSIS — Z79.4 DIABETES MELLITUS DUE TO UNDERLYING CONDITION WITH HYPERGLYCEMIA, WITH LONG-TERM CURRENT USE OF INSULIN (HCC): ICD-10-CM

## 2022-07-12 DIAGNOSIS — E08.65 DIABETES MELLITUS DUE TO UNDERLYING CONDITION WITH HYPERGLYCEMIA, WITH LONG-TERM CURRENT USE OF INSULIN (HCC): ICD-10-CM

## 2022-07-12 DIAGNOSIS — I25.10 CORONARY ARTERY DISEASE DUE TO LIPID RICH PLAQUE: Primary | ICD-10-CM

## 2022-07-12 DIAGNOSIS — E78.5 HYPERLIPIDEMIA, UNSPECIFIED HYPERLIPIDEMIA TYPE: ICD-10-CM

## 2022-07-12 DIAGNOSIS — I10 ESSENTIAL HYPERTENSION: ICD-10-CM

## 2022-07-12 PROCEDURE — 99214 OFFICE O/P EST MOD 30 MIN: CPT | Performed by: INTERNAL MEDICINE

## 2022-07-12 PROCEDURE — 1123F ACP DISCUSS/DSCN MKR DOCD: CPT | Performed by: INTERNAL MEDICINE

## 2022-07-12 NOTE — PROGRESS NOTES
[address]  PHONE: [phone]    SUBJECTIVE:   Anila Medina is a 67 y.o. male 1950   seen for a follow up visit regarding the following:     Chief Complaint   Patient presents with    Hypertension     6 month follow up         History of present illness: 67 y.o. male presented for follow-up 7/12/22 cath stable antomy patent stent cath 6/2020. Last note reviewed from 5/31/2022 Dr. Teodoro Hernandez gastroenterology. Last labs 4/15/2022 hemoglobin 14.9. 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 Patient was recommended to be on anticoagulation. Patient declined        Interval hx    Cath done 2020 due to sx  simliar to last sent  HGb stable. catheterization was in 1999. Patient presented to review stress test results. He was originally seen in 2016. He has a previous history of stenting to unknown vessel in 1999  Additionally, history of microcytic anemia that was evaluated with bone marrow biopsy and iron studies. He is followed by gastroenterology for cryptogenic  cirrhosis, esophageal varices that have been banded in the past.  He is also followed by [address] [place] [address] Hematology/Oncology for his anemia. He presented 12/05/2017. He underwent stress perfusion imaging in 12/2017, which was within normal limits. Cardiac and other Notable History:    1. Cirrhosis diagnosed in 2016, cryptogenic with varices. 2.   Echocardiogram 12/2016 normal left ventricular systolic function normal diastolic function RVSP 34 mmHg  3.   12/01/2017 upper endoscopy. Impression - esophagus evidence of prior banding, multiple ulcerated polyps in stomach, PHG without bleeding. 4.   11/13/2017 hemoglobin 13, platelets 99.    5.   12/2017 Lexiscan perfusion imaging normal.    6.   4/30/20 Sinus  Bradycardia  Low voltage in precordial leads. 7.   5/2020 Fixed defect on stress perfusion study.    8.   2020 carotids with <50% stenosis   9.   2020 Cath stable anatomy with patent stent   10.   6/2020 echocardiogram EF 55 to 60% unremarkable for valvular disease  16.  1/4/2021 sinus rhythm, normal rate, normal IN intervals, ST wave normal, normal axis,      Assessment and Plan:   1. Coronary artery disease,  ·   Controlled   ·   Continue medications at doses listed below   Key CAD CHF Meds          atorvastatin (LIPITOR) 20 MG tablet (Taking)    Class: Historical Med    nadolol (CORGARD) 40 MG tablet (Taking)    Class: Historical Med        2. Hyperlipidemia. ·   On Lipitor therapy. ·   Controlled labs   · atorvastatin - 20 MG   3. Anemia. ·   Followed by PCP and oncologist.    ·   Appears to be iron deficiency anemia. Last hemoglobin   Lab Results   Component Value Date    HGB 14.9 04/15/2022        4. Cirrhosis. ·   MELD score of 8. ·   Followed by gastroenterology. · Splenic venin thombosis   · AC discussed and declined   · Plan to discusses with GI   5. Shortness of breath. ·    Echocardiogram 2020  - normal left ventricular systolic function,  6. Dizziness   ·   controlled no sig carotid disease.   7.   DM    ·   controled currently on SGL 2 therapy  insulin glargine - 100 UNIT/ML  · NovoLOG FlexPen Sopn - 100 UNIT/ML         :     Current Outpatient Medications   Medication Sig    CPAP Machine MISC by Does not apply route    ascorbic acid (VITAMIN C) 500 MG tablet Take 250 mg by mouth 3 times daily     aspirin 81 MG EC tablet Take 81 mg by mouth at bedtime     atorvastatin (LIPITOR) 20 MG tablet Take 10 mg by mouth at bedtime     vitamin D3 (CHOLECALCIFEROL) 125 MCG (5000 UT) TABS tablet Take 5,000 Units by mouth daily    glucose 4g chewable tablet Take 15 g by mouth as needed    empagliflozin (JARDIANCE) 25 MG tablet Take 25 mg by mouth daily    ferrous sulfate (FE TABS 325) 325 (65 Fe) MG EC tablet Take 325 mg by mouth 2 times daily (with meals)    fluticasone (FLONASE) 50 MCG/ACT nasal spray 2 sprays by Nasal route daily    gabapentin (NEURONTIN) 100 MG capsule Take by mouth 3 times daily.  insulin aspart (NOVOLOG FLEXPEN) 100 UNIT/ML injection pen Inject 4 Units into the skin 3 times daily (before meals) If BS >120 and 150, if anything 150 \"double up\"    insulin glargine (LANTUS) 100 UNIT/ML injection vial Inject 22 Units into the skin nightly     isosorbide mononitrate (IMDUR) 60 MG extended release tablet Take 30 mg by mouth daily     nadolol (CORGARD) 40 MG tablet daily TAKE ONE TABLET BY MOUTH DAILY --FOR HEART/BLOOD PRESSURE    nitroGLYCERIN (NITROSTAT) 0.4 MG SL tablet Place under the tongue    omeprazole (PRILOSEC) 40 MG delayed release capsule Take 40 mg by mouth daily     sucralfate (CARAFATE) 1 GM tablet Take 1 g by mouth 2 times daily as needed     tamsulosin (FLOMAX) 0.4 MG capsule Take 0.4 mg by mouth at bedtime     Umeclidinium Bromide (INCRUSE ELLIPTA) 62.5 MCG/INH AEPB Inhale 1 puff into the lungs daily     No current facility-administered medications for this visit. Past Medical History, Past Surgical History, Family history, Social History, and Medications were all reviewed with the patient today and updated as necessary.        Allergies   Allergen Reactions    Metformin Diarrhea     Past Medical History:   Diagnosis Date    BPH (benign prostatic hyperplasia)     on the med flomax    CAD (coronary artery disease)     MI--stent x 1- 1999---- no problems since per pt-- followed by dr. Magno Hopper Chronic back pain     Cirrhosis ([facility] Dx 7/26/16    COPD (chronic obstructive pulmonary disease) ([facility]     Diabetes mellitus ([facility]     type 2, BS     GERD (gastroesophageal reflux disease)     controlled with med    Hearing reduced     Hypercholesterolemia     Hyperlipidemia     managed by  meds    Hypertension     controlled with med    Iron deficiency anemia     Esophogeal banding    Iron deficiency anemia     Malignant hyperthermia due to anesthesia     1st cousin- on mom's side--- 30 yrs ago--per pt-- no one was tested in his family-REPORT TO DR Uziel Louie- \"My oldest sister has never had it with several surgeries\"    JERILYN on CPAP     wears cpap at hs    Thrombocytopenia (HCC)     Tinnitus     bilat    Varices, esophageal (HCC)     several bandings per pt     Past Surgical History:   Procedure Laterality Date    ANKLE FRACTURE SURGERY Right     pt has screws in r ankle    BREAST SURGERY  age 14    muscles removed in chest age 14    [facility] CERVICAL FUSION      ACDF multilevel    CHOLECYSTECTOMY, LAPAROSCOPIC          COLONOSCOPY N/A 2019    COLONOSCOPY/BMI 25 performed by Mureil Rust MD at [place] [address] COLONOSCOPY N/A 2022    COLONOSCOPY POLYPECTOMY SNARE/COLD BIOPSY performed by Sinan Vergara MD at [address] [place] [address]      esophageal banding    LUMBAR LAMINECTOMY      OTHER SURGICAL HISTORY Left     gangrene left leg-- wounded in [address]    stent x1    SHOULDER ARTHROSCOPY Left     TONSILLECTOMY  as child    UPPER GASTROINTESTINAL ENDOSCOPY      multi    UPPER GASTROINTESTINAL ENDOSCOPY N/A 2022    EGD BIOPSY performed by Sinan Vergara MD at [facility] ENDOSCOPY     Family History   Problem Relation Age of Onset    No Known Problems Sister     Heart Disease Father         pace maker    Liver Disease Mother     Diabetes Mother     Other Mother         liver disease secondary to hepatitis- from transfusion    Stroke Sister     No Known Problems Sister     No Known Problems Brother     Coronary Art Dis Other         fam hx    Diabetes Sister     No Known Problems Brother       Social History     Tobacco Use    Smoking status: Former Smoker     Packs/day: 1.50     Quit date: 1999     Years since quittin.5    Smokeless tobacco: Never Used   Substance Use Topics    Alcohol use: Not Currently     Alcohol/week: 0.0 standard drinks       ROS:    ROS        PHYSICAL EXAM:    /66 Pulse 60   Ht 5' 6\" (1.676 m)   Wt 151 lb 6.4 oz (68.7 kg)   BMI 24.44 kg/m²        Wt Readings from Last 3 Encounters:   07/12/22 151 lb 6.4 oz (68.7 kg)   05/31/22 148 lb (67.1 kg)   04/29/22 149 lb (67.6 kg)     BP Readings from Last 3 Encounters:   07/12/22 120/66   05/31/22 112/75   04/29/22 110/60         Physical Exam    Medical problems and test results were reviewed with the patient today. No results found for this or any previous visit (from the past 672 hour(s)). Lab Results   Component Value Date/Time    CHOL 104 04/29/2021 10:46 AM    HDL 41 04/29/2021 10:46 AM    VLDL 20 04/29/2021 10:46 AM              CRISTAL Candelaria was seen today for hypertension. Diagnoses and all orders for this visit:    Coronary artery disease due to lipid rich plaque    Hyperlipidemia, unspecified hyperlipidemia type    Essential hypertension    Diabetes mellitus due to underlying condition with hyperglycemia, with long-term current use of insulin ([facility] [address]      Return in about 6 months (around 1/12/2023).        Milton Sheikh MD  7/12/2022  10:07 AM

## 2022-08-02 ENCOUNTER — OFFICE VISIT (OUTPATIENT)
Dept: FAMILY MEDICINE CLINIC | Facility: CLINIC | Age: 72
End: 2022-08-02
Payer: COMMERCIAL

## 2022-08-02 VITALS
SYSTOLIC BLOOD PRESSURE: 110 MMHG | HEART RATE: 59 BPM | DIASTOLIC BLOOD PRESSURE: 60 MMHG | WEIGHT: 150 LBS | OXYGEN SATURATION: 97 % | HEIGHT: 66 IN | BODY MASS INDEX: 24.11 KG/M2

## 2022-08-02 DIAGNOSIS — H65.191 ACUTE MIDDLE EAR EFFUSION, RIGHT: Primary | ICD-10-CM

## 2022-08-02 PROCEDURE — 1123F ACP DISCUSS/DSCN MKR DOCD: CPT | Performed by: FAMILY MEDICINE

## 2022-08-02 PROCEDURE — 99213 OFFICE O/P EST LOW 20 MIN: CPT | Performed by: FAMILY MEDICINE

## 2022-08-02 ASSESSMENT — PATIENT HEALTH QUESTIONNAIRE - PHQ9
SUM OF ALL RESPONSES TO PHQ QUESTIONS 1-9: 0
SUM OF ALL RESPONSES TO PHQ QUESTIONS 1-9: 0
1. LITTLE INTEREST OR PLEASURE IN DOING THINGS: 0
SUM OF ALL RESPONSES TO PHQ9 QUESTIONS 1 & 2: 0
2. FEELING DOWN, DEPRESSED OR HOPELESS: 0
SUM OF ALL RESPONSES TO PHQ QUESTIONS 1-9: 0
SUM OF ALL RESPONSES TO PHQ QUESTIONS 1-9: 0

## 2022-08-02 NOTE — PROGRESS NOTES
Silas Huizar is a 67 y.o. male who presents today for the following:  Chief Complaint   Patient presents with    Ear Fullness       Allergies   Allergen Reactions    Metformin Diarrhea       Current Outpatient Medications   Medication Sig Dispense Refill    CPAP Machine MISC by Does not apply route      ascorbic acid (VITAMIN C) 500 MG tablet Take 250 mg by mouth 3 times daily       aspirin 81 MG EC tablet Take 81 mg by mouth at bedtime       atorvastatin (LIPITOR) 20 MG tablet Take 10 mg by mouth at bedtime       vitamin D3 (CHOLECALCIFEROL) 125 MCG (5000 UT) TABS tablet Take 5,000 Units by mouth daily      glucose 4g chewable tablet Take 15 g by mouth as needed      empagliflozin (JARDIANCE) 25 MG tablet Take 25 mg by mouth daily      ferrous sulfate (FE TABS 325) 325 (65 Fe) MG EC tablet Take 325 mg by mouth 2 times daily (with meals)      fluticasone (FLONASE) 50 MCG/ACT nasal spray 2 sprays by Nasal route daily      gabapentin (NEURONTIN) 100 MG capsule Take by mouth 3 times daily. insulin aspart (NOVOLOG FLEXPEN) 100 UNIT/ML injection pen Inject 4 Units into the skin 3 times daily (before meals) If BS >120 and 150, if anything 150 \"double up\"      insulin glargine (LANTUS) 100 UNIT/ML injection vial Inject 22 Units into the skin nightly       isosorbide mononitrate (IMDUR) 60 MG extended release tablet Take 30 mg by mouth daily       nadolol (CORGARD) 40 MG tablet daily TAKE ONE TABLET BY MOUTH DAILY --FOR HEART/BLOOD PRESSURE      nitroGLYCERIN (NITROSTAT) 0.4 MG SL tablet Place under the tongue      omeprazole (PRILOSEC) 40 MG delayed release capsule Take 40 mg by mouth daily       sucralfate (CARAFATE) 1 GM tablet Take 1 g by mouth 2 times daily as needed       tamsulosin (FLOMAX) 0.4 MG capsule Take 0.4 mg by mouth at bedtime       Umeclidinium Bromide (INCRUSE ELLIPTA) 62.5 MCG/INH AEPB Inhale 1 puff into the lungs daily       No current facility-administered medications for this visit. Past Medical History:   Diagnosis Date    BPH (benign prostatic hyperplasia)     on the med flomax    CAD (coronary artery disease)     MI--stent x 1- 1999---- no problems since per pt-- followed by dr. Thao Tinsley    Chronic back pain     Cirrhosis ([facility] Dx 7/26/16    COPD (chronic obstructive pulmonary disease) ([facility] [address] [facility]     Diabetes mellitus ([facility] [address] [facility]     type 2, BS     GERD (gastroesophageal reflux disease)     controlled with med    Hearing reduced     Hypercholesterolemia     Hyperlipidemia     managed by  meds    Hypertension     controlled with med    Iron deficiency anemia     Esophogeal banding    Iron deficiency anemia     Malignant hyperthermia due to anesthesia     1st cousin- on mom's side--- 30 yrs ago--per pt-- no one was tested in his family-REPORT TO DR Olga Ag oldest sister has never had it with several surgeries\"    JERILYN on CPAP     wears cpap at hs    Thrombocytopenia (HCC)     Tinnitus     bilat    Varices, esophageal (HCC)     several bandings per pt       Past Surgical History:   Procedure Laterality Date    ANKLE FRACTURE SURGERY Right     pt has screws in r ankle    BREAST SURGERY  age 14    muscles removed in chest age 14     CERVICAL FUSION      ACDF multilevel    CHOLECYSTECTOMY, LAPAROSCOPIC      2010    COLONOSCOPY N/A 11/7/2019    COLONOSCOPY/BMI 25 performed by Gopi Cortez MD at [facility] ENDOSCOPY    COLONOSCOPY N/A 5/31/2022    COLONOSCOPY POLYPECTOMY SNARE/COLD BIOPSY performed by Deonna Mckeon MD at [address] [place] [address]      esophageal banding    LUMBAR LAMINECTOMY      OTHER SURGICAL HISTORY Left     gangrene left leg-- wounded in [address]    stent x1    SHOULDER ARTHROSCOPY Left     TONSILLECTOMY  as child    UPPER GASTROINTESTINAL ENDOSCOPY      multi    UPPER GASTROINTESTINAL ENDOSCOPY N/A 5/31/2022    EGD BIOPSY performed by Deonna Mckeon MD at [facility] ENDOSCOPY       Social History     Tobacco Use    Smoking status: Former Packs/day: 1.50     Types: Cigarettes     Quit date: 1999     Years since quittin.6    Smokeless tobacco: Never   Substance Use Topics    Alcohol use: Not Currently     Alcohol/week: 0.0 standard drinks        Family History   Problem Relation Age of Onset    No Known Problems Sister     Heart Disease Father         pace maker    Liver Disease Mother     Diabetes Mother     Other Mother         liver disease secondary to hepatitis- from transfusion    Stroke Sister     No Known Problems Sister     No Known Problems Brother     Coronary Art Dis Other         fam hx    Diabetes Sister     No Known Problems Brother        Patient is here today for sick visit for ear fullness. Reports pain on right side of face and in ear. Reports unable to hear out of right ear which is chronic. Has history of recurrent ear effusions. Has previously seen ENT and recommend tube placement. Patient reports pain has improved. He possibly had small amount of blood drain from ear over the weekend. Review of Systems   Constitutional:  Negative for appetite change, chills, fatigue and fever. HENT:  Positive for ear pain and hearing loss. Negative for ear discharge and facial swelling. There were no vitals taken for this visit. Physical Exam  Constitutional:       Appearance: Normal appearance. HENT:      Ears:      Comments: Bilateral middle ear effusions with clear fluid  Pulmonary:      Effort: Pulmonary effort is normal. No respiratory distress. Neurological:      Mental Status: He is alert. 1. Acute middle ear effusion, right   Counseled on conservative management with flonase, trying to \"pop\" ears. Reassured patient that do not feel antibiotic is needed at this time. Symptoms are resolving. Patient is in agreement with plan. Recommend referral to ENT if symptoms fail to resolve. Patient already has flonase at home.                Conor Crabtree MD

## 2022-08-03 ASSESSMENT — ENCOUNTER SYMPTOMS: FACIAL SWELLING: 0

## 2022-09-22 ENCOUNTER — HOSPITAL ENCOUNTER (OUTPATIENT)
Dept: CT IMAGING | Age: 72
Discharge: HOME | End: 2022-09-25
Payer: COMMERCIAL

## 2022-09-22 DIAGNOSIS — I85.00 ESOPHAGEAL VARICES WITHOUT BLEEDING, UNSPECIFIED ESOPHAGEAL VARICES TYPE (HCC): ICD-10-CM

## 2022-09-22 DIAGNOSIS — Z86.39 PERSONAL HISTORY OF OTHER ENDOCRINE, NUTRITIONAL AND METABOLIC DISEASE: ICD-10-CM

## 2022-09-22 DIAGNOSIS — K21.9 GASTROESOPHAGEAL REFLUX DISEASE WITHOUT ESOPHAGITIS: ICD-10-CM

## 2022-09-22 DIAGNOSIS — K76.6 PORTAL HYPERTENSION (HCC): ICD-10-CM

## 2022-09-22 DIAGNOSIS — K74.69 OTHER CIRRHOSIS OF LIVER (HCC): ICD-10-CM

## 2022-09-22 DIAGNOSIS — Z86.010 PERSONAL HISTORY OF COLONIC POLYPS: ICD-10-CM

## 2022-09-22 DIAGNOSIS — K31.819 ANGIODYSPLASIA OF STOMACH AND DUODENUM WITHOUT BLEEDING: ICD-10-CM

## 2022-09-22 LAB — CREAT BLD-MCNC: 0.7 MG/DL (ref 0.8–1.5)

## 2022-09-22 PROCEDURE — 6360000004 HC RX CONTRAST MEDICATION: Performed by: INTERNAL MEDICINE

## 2022-09-22 PROCEDURE — 74177 CT ABD & PELVIS W/CONTRAST: CPT

## 2022-09-22 PROCEDURE — 82565 ASSAY OF CREATININE: CPT

## 2022-09-22 PROCEDURE — 2580000003 HC RX 258: Performed by: INTERNAL MEDICINE

## 2022-09-22 RX ORDER — SODIUM CHLORIDE 0.9 % (FLUSH) 0.9 %
10 SYRINGE (ML) INJECTION
Status: COMPLETED | OUTPATIENT
Start: 2022-09-22 | End: 2022-09-22

## 2022-09-22 RX ORDER — 0.9 % SODIUM CHLORIDE 0.9 %
100 INTRAVENOUS SOLUTION INTRAVENOUS
Status: COMPLETED | OUTPATIENT
Start: 2022-09-22 | End: 2022-09-22

## 2022-09-22 RX ADMIN — SODIUM CHLORIDE, PRESERVATIVE FREE 10 ML: 5 INJECTION INTRAVENOUS at 10:15

## 2022-09-22 RX ADMIN — IOPAMIDOL 100 ML: 755 INJECTION, SOLUTION INTRAVENOUS at 10:15

## 2022-09-22 RX ADMIN — SODIUM CHLORIDE 100 ML: 9 INJECTION, SOLUTION INTRAVENOUS at 10:15

## 2022-09-22 RX ADMIN — DIATRIZOATE MEGLUMINE AND DIATRIZOATE SODIUM 15 ML: 660; 100 LIQUID ORAL; RECTAL at 10:14

## 2022-10-25 NOTE — PROGRESS NOTES
Patient Name:  Yuniel Garcia                             YOB: 1950  MRN: [id number]                                              Office Visit 10/26/2022    ASSESSMENT AND PLAN:  (Medical Decision Making)    Naheed Jones was seen today for shortness of breath. Diagnoses and all orders for this visit:    Pulmonary emphysema, unspecified emphysema type ([facility]  --no obstruction on CPFTs, but does have decreased DLCO and emphysema on CT scan. --ongoing PIERSON. Have recommended pulmonary rehab in the past, but travel distance is prohibitive. Have recommended online pulmonary rehab and he agrees to this. --continue Incruse. May use albuterol 2 puffs q 4 hours prn dyspnea. Dyspnea on exertion  --online pulmonary rehab as above. Personal history of nicotine dependence  --does not qualify for LDCT--encouraged to remain smoke free. Other orders  -     albuterol sulfate HFA (VENTOLIN HFA) 108 (90 Base) MCG/ACT inhaler; Inhale 2 puffs into the lungs every 4 hours as needed for Wheezing or Shortness of Breath    Orders Placed This Encounter   Medications    albuterol sulfate HFA (VENTOLIN HFA) 108 (90 Base) MCG/ACT inhaler     Sig: Inhale 2 puffs into the lungs every 4 hours as needed for Wheezing or Shortness of Breath     Dispense:  1 each     Refill:  11     No orders of the defined types were placed in this encounter. Follow-up and Dispositions    Return in about 6 months (around 4/26/2023) for Queenie, COPD, spirometry, Arrive 15 minutes prior to appt time. Collaborating physician is Dr. Ganesh Wolff. Vern Rasheed NP, APRN - CNP    ___________________________________________________________    HISTORY OF PRESENT ILLNESS:    Mr. Jennifer Haywood is a 67 y.o. male who is seen at [facility] today for  Shortness of Breath     The patient is a 67year old male who is seen for follow up of PIERSON and emphysema. He is accompanied by his wife.   He has a history of esophageal varices, JERILYN (wears CPAP--managed  by [facility]), iron deficiency anemia, HTN, hyperlipidemia, hearling loss, GERD, DM2, cirrhosis, chronic back pain, CAD (MI and stent in 1999), and BPH. He has a 60 pack year history of cigarette smoking, but quit smoking in 1997. Was given a trial Anoro last visit, but he did not perceive any difference and thus went back to Incruse. Denies any cough or wheezing. Reports significant PIERSON, unchanged. This is worse in the evenings when going up/down stairs frequently to go to the bathroom. Feels that the Incruse only helps for 8-10 hours. No LE edema. No orthopnea. REVIEW OF SYSTEMS: 10 point review of systems is negative except as reported in HPI. PHYSICAL EXAM: Body mass index is 24.52 kg/m². Vitals:    10/26/22 0916   BP: 116/64   Pulse: 71   Resp: 14   Temp: 96.8 °F (36 °C)   SpO2: 95%   Weight: 151 lb 14.4 oz (68.9 kg)   Height: 5' 6\" (1.676 m)         General:   Alert, cooperative, no distress, appears stated age. Eyes:   Conjunctivae/corneas clear. PERRL        Mouth/Throat:  Lips, mucosa, and tongue normal. Teeth and gums normal.        Lungs:     Breath sounds decreased bilaterally, but clear. Heart:   Regular rate and rhythm, S1, S2 normal, no murmur, click, rub or gallop. Abdomen:    Soft, non-tender. Extremities:  Extremities normal, atraumatic, no cyanosis or edema. Skin:  Skin color normal. No rashes or lesions     Neurologic:  A&Ox3     DIAGNOSTIC TESTS:                                                                                    LABS:   Lab Results   Component Value Date/Time    WBC 6.2 04/15/2022 08:11 PM    HGB 14.9 04/15/2022 08:11 PM    HCT 44.8 04/15/2022 08:11 PM    PLT 99 04/15/2022 08:11 PM    ESR 15 08/02/2019 11:12 AM    CRP 2.0 08/02/2019 11:12 AM     Imaging: I performed an independent interpretation of the patient's images.   CXR:   XR LUMBAR SPINE (2-3 VIEWS) 12/07/2021    Narrative  AUTOMATIC ADMINISTRATIVE RESULT    The result for this exam can be found in the Progress note in the chart. Impression  :  See Progress note in the chart. CT Chest:   CT ABDOMEN PELVIS W IV CONTRAST 09/22/2022    Narrative  EXAMINATION: CT ABDOMEN PELVIS W IV CONTRAST 9/22/2022 10:14 AM    ACCESSION NUMBER: [id number]    COMPARISON: CT abdomen pelvis dated 3/4/2022    INDICATION: Esophageal varices without bleeding; Gastro-esophageal reflux  disease without esophagitis; Angiodysplasia of stomach and duodenum without  bleeding; Other cirrhosis of liver; Portal hypertension; Personal history of  colonic polyps; Personal history of other endocrine, nutritional and metabolic  diseas    TECHNIQUE: Contiguous axial computed tomographic images were obtained from the  domes of the diaphragm to the symphysis pubis following administration 100mL  Iso-ronda 370. Postcontrast images were obtained in arterial, portal venous, and  delayed phases. Coronal reconstructions were also performed. Radiation dose reduction techniques were used for this study. Our CT scanners  use one or all of the following: Automated exposure control, adjustment of the  mA and/or kV according to patient size, iterative reconstruction. FINDINGS:  LUNG BASES: No airspace consolidation within the lung bases. No sizable pleural  effusion. The heart is not enlarged. LIVER: The liver contour is normal. No new or enlarging liver lesions. BILIARY TREE: The gallbladder is within normal limits. No biliary dilation. SPLEEN: Small amount of nonocclusive thrombus within the splenic vein. The  spleen is enlarged, measuring up to 17 cm. Paraesophageal varices. PANCREAS: No pancreatic mass or ductal dilation. ADRENALS: Normal.    KIDNEYS/BLADDER: The kidneys are symmetric in size. No renal calculus or  hydronephrosis. No renal mass. The urinary bladder is unremarkable. BOWEL: Perirectal varices. The small bowel is normal in caliber. No bowel wall  thickening. Perigastric varices. PERITONEUM/RETROPERITONEUM: Trace ascites. No pelvic or retroperitoneal  lymphadenopathy. Multiple small retroperitoneal and intraperitoneal collateral  vessels are evident. VESSELS: No abdominal aortic aneurysm. Stable appearance of the venous  enhancing 7 mm lesion within the hepatic dome. Nonocclusive portal venous  thrombus extends from the SMV into the main portal vein and right intrahepatic  portal vein. Mild calcified atherosclerotic disease. The distribution of the  thrombus appears similar to the prior study. ABDOMINAL WALL: No hernia or mass. REPRODUCTIVE: The prostate gland is mildly enlarged. BONES: No suspicious osseous lesion. Moderate degenerative changes of the  lumbar spine. Impression  1. Redemonstrated nonocclusive SMV, splenic vein, main portal vein, and  intrahepatic portal vein thrombus. Overall, the distribution of thrombus is  similar to the 3/4/2022 CT abdomen pelvis. 2.  Similar appearance of a 7 mm venous enhancing lesion within the hepatic  dome, possibly representing a small shunt or fistula. No new or enlarging liver  lesion. 3.  Numerous small collateral vessels within the retroperitoneum and peritoneum,  suggestive of advanced portal hypertension. 4.  Splenomegaly. 5.  Trace ascites. Nuclear Medicine: No results found for this or any previous visit from the past 3650 days. PFTs:   No flowsheet data found. No results found for this or any previous visit. No results found for this or any previous visit. FeNO: No results found for this or any previous visit. FeNO and Likelihood of Eosinophilic Asthma   Unlikely Intermediate Likely   <25 ppb 25-50 ppb >50ppb   Exercise Oximetry:  Echo: No results found for this or any previous visit from the past 3650 days.     [facility] Reference Info:                                                                                                                  Past Medical History:   Diagnosis Date BPH (benign prostatic hyperplasia)     on the med flomax    CAD (coronary artery disease)     MI--stent x 1999---- no problems since per pt-- followed by dr. Tamika Willard    Chronic back pain     Cirrhosis ([facility] Dx 16    COPD (chronic obstructive pulmonary disease) ([facility]     Diabetes mellitus ([facility]     type 2, BS     GERD (gastroesophageal reflux disease)     controlled with med    Hearing reduced     Hypercholesterolemia     Hyperlipidemia     managed by  meds    Hypertension     controlled with med    Iron deficiency anemia     Esophogeal banding    Iron deficiency anemia     Malignant hyperthermia due to anesthesia     1st cousin- on mom's side--- 30 yrs ago--per pt-- no one was tested in his family-REPORT TO DR Jose M Dash oldest sister has never had it with several surgeries\"    JERILNY on CPAP     wears cpap at hs    Thrombocytopenia (HCC)     Tinnitus     bilat    Varices, esophageal (HCC)     several bandings per pt        Tobacco Use      Smoking status: Former        Packs/day: 1.50        Types: Cigarettes        Quit date: 1999        Years since quittin.8      Smokeless tobacco: Never    Allergies   Allergen Reactions    Metformin Diarrhea     Current Outpatient Medications   Medication Instructions    albuterol sulfate HFA (VENTOLIN HFA) 108 (90 Base) MCG/ACT inhaler 2 puffs, Inhalation, EVERY 4 HOURS PRN    ascorbic acid (VITAMIN C) 250 mg, Oral, 3 TIMES DAILY    aspirin 81 mg, Oral, Nightly    atorvastatin (LIPITOR) 10 mg, Oral, Nightly    CPAP Machine MISC Does not apply    empagliflozin (JARDIANCE) 25 mg, Oral, DAILY    ferrous sulfate (FE TABS 325) 325 mg, Oral, 2 TIMES DAILY WITH MEALS    fluticasone (FLONASE) 50 MCG/ACT nasal spray 2 sprays, Nasal, DAILY    gabapentin (NEURONTIN) 100 MG capsule Oral, 3 TIMES DAILY    glucose 15 g, Oral, PRN    insulin glargine (LANTUS) 22 Units, SubCUTAneous, NIGHTLY    isosorbide mononitrate (IMDUR) 30 mg, Oral, DAILY    nadolol

## 2022-10-26 ENCOUNTER — OFFICE VISIT (OUTPATIENT)
Dept: PULMONOLOGY | Age: 72
End: 2022-10-26
Payer: OTHER GOVERNMENT

## 2022-10-26 VITALS
SYSTOLIC BLOOD PRESSURE: 116 MMHG | HEART RATE: 71 BPM | OXYGEN SATURATION: 95 % | RESPIRATION RATE: 14 BRPM | BODY MASS INDEX: 24.41 KG/M2 | DIASTOLIC BLOOD PRESSURE: 64 MMHG | WEIGHT: 151.9 LBS | TEMPERATURE: 96.8 F | HEIGHT: 66 IN

## 2022-10-26 DIAGNOSIS — R06.09 DYSPNEA ON EXERTION: ICD-10-CM

## 2022-10-26 DIAGNOSIS — Z87.891 PERSONAL HISTORY OF NICOTINE DEPENDENCE: ICD-10-CM

## 2022-10-26 DIAGNOSIS — J43.9 PULMONARY EMPHYSEMA, UNSPECIFIED EMPHYSEMA TYPE (HCC): Primary | ICD-10-CM

## 2022-10-26 PROCEDURE — 3074F SYST BP LT 130 MM HG: CPT | Performed by: NURSE PRACTITIONER

## 2022-10-26 PROCEDURE — 99214 OFFICE O/P EST MOD 30 MIN: CPT | Performed by: NURSE PRACTITIONER

## 2022-10-26 PROCEDURE — 3078F DIAST BP <80 MM HG: CPT | Performed by: NURSE PRACTITIONER

## 2022-10-26 PROCEDURE — 1123F ACP DISCUSS/DSCN MKR DOCD: CPT | Performed by: NURSE PRACTITIONER

## 2022-10-26 RX ORDER — ALBUTEROL SULFATE 90 UG/1
2 AEROSOL, METERED RESPIRATORY (INHALATION) EVERY 4 HOURS PRN
Qty: 1 EACH | Refills: 11 | Status: SHIPPED | OUTPATIENT
Start: 2022-10-26

## 2023-01-11 ENCOUNTER — OFFICE VISIT (OUTPATIENT)
Dept: CARDIOLOGY CLINIC | Age: 73
End: 2023-01-11

## 2023-01-11 VITALS
SYSTOLIC BLOOD PRESSURE: 138 MMHG | HEIGHT: 66 IN | HEART RATE: 74 BPM | BODY MASS INDEX: 25.28 KG/M2 | DIASTOLIC BLOOD PRESSURE: 82 MMHG | WEIGHT: 157.3 LBS

## 2023-01-11 DIAGNOSIS — I25.10 CORONARY ARTERY DISEASE DUE TO LIPID RICH PLAQUE: Primary | ICD-10-CM

## 2023-01-11 DIAGNOSIS — I10 ESSENTIAL HYPERTENSION: ICD-10-CM

## 2023-01-11 DIAGNOSIS — I25.83 CORONARY ARTERY DISEASE DUE TO LIPID RICH PLAQUE: Primary | ICD-10-CM

## 2023-01-11 DIAGNOSIS — E08.65 DIABETES MELLITUS DUE TO UNDERLYING CONDITION WITH HYPERGLYCEMIA, WITH LONG-TERM CURRENT USE OF INSULIN (HCC): ICD-10-CM

## 2023-01-11 DIAGNOSIS — Z79.4 DIABETES MELLITUS DUE TO UNDERLYING CONDITION WITH HYPERGLYCEMIA, WITH LONG-TERM CURRENT USE OF INSULIN (HCC): ICD-10-CM

## 2023-01-11 PROCEDURE — 99214 OFFICE O/P EST MOD 30 MIN: CPT | Performed by: INTERNAL MEDICINE

## 2023-01-11 PROCEDURE — 3075F SYST BP GE 130 - 139MM HG: CPT | Performed by: INTERNAL MEDICINE

## 2023-01-11 PROCEDURE — 3079F DIAST BP 80-89 MM HG: CPT | Performed by: INTERNAL MEDICINE

## 2023-01-11 PROCEDURE — 1123F ACP DISCUSS/DSCN MKR DOCD: CPT | Performed by: INTERNAL MEDICINE

## 2023-01-11 PROCEDURE — 93000 ELECTROCARDIOGRAM COMPLETE: CPT | Performed by: INTERNAL MEDICINE

## 2023-01-11 ASSESSMENT — ENCOUNTER SYMPTOMS
EYE PAIN: 0
NAIL CHANGES: 0
ABDOMINAL PAIN: 0
STRIDOR: 0
APHONIA: 0
COUGH: 0

## 2023-01-11 NOTE — PROGRESS NOTES
[address] [place]  [address], [address] [facility], [address] [place] [address]  PHONE: [phone]    SUBJECTIVE:   Myra Espinal is a 67 y.o. male 1950   seen for a follow up visit regarding the following:     Chief Complaint   Patient presents with    Coronary Artery Disease    Hypertension    6 Month Follow-Up         History of present illness: 67 y.o. male presented for follow-up 1/11/23   Last labs 4/15/2022 hemoglobin 14.9. 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 Patient was recommended to be on anticoagulation. Patient declined  in the past. He is seeing Dr. Dori Schmidt in the next few weeks. Interval hx    Cath done 2020 due to sx  simliar to last sent  HGb stable. catheterization was in 1999. Patient presented to review stress test results. He was originally seen in 2016. He has a previous history of stenting to unknown vessel in 1999  Additionally, history of microcytic anemia that was evaluated with bone marrow biopsy and iron studies. He is followed by gastroenterology for cryptogenic  cirrhosis, esophageal varices that have been banded in the past.  He is also followed by [facility] for his anemia. He presented 12/05/2017. He underwent stress perfusion imaging in 12/2017, which was within normal limits. Cardiac and other Notable History:      Cirrhosis diagnosed in 2016, cryptogenic with varices. Echocardiogram 12/2016 normal left ventricular systolic function normal diastolic function RVSP 34 mmHg    12/01/2017 upper endoscopy. Impression - esophagus evidence of prior banding, multiple ulcerated polyps in stomach, PHG without bleeding. 11/13/2017 hemoglobin 13, platelets 99.      23/9251 Lexiscan perfusion imaging normal.      4/30/20 Sinus  Bradycardia  Low voltage in precordial leads. 5/2020 Fixed defect on stress perfusion study.      2020 carotids with <50% stenosis     2020 Cath stable anatomy with patent stent     6/2020 echocardiogram EF 55 to 60% unremarkable for valvular disease   1/4/2021 sinus rhythm, normal rate, normal OR intervals, ST wave normal, normal axis,  1/11/2023 sinus rhythm normal OR interval nonspecific ST abnormalities      Assessment and Plan:     Coronary artery disease,    Controlled     Continue medications at doses listed below   Key CAD CHF Meds            atorvastatin (LIPITOR) 20 MG tablet (Taking)    Class: Historical Med    nadolol (CORGARD) 40 MG tablet (Taking)    Class: Historical Med            Hyperlipidemia. On Lipitor therapy. Controlled labs   atorvastatin - 20 MG     Anemia. Followed by PCP and oncologist.      Yaritza Loveless to be iron deficiency anemia. Last hemoglobin   Lab Results   Component Value Date    HGB 14.9 04/15/2022         Cirrhosis. MELD score of 8. Followed by gastroenterology. Splenic venin thombosis   AC discussed and declined     Shortness of breath. Echocardiogram 2020  - normal left ventricular systolic function,    Dizziness     controlled no sig carotid disease.     DM     controled currently on SGL 2 therapy  insulin glargine - 100 UNIT/ML  NovoLOG FlexPen Sopn - 100 UNIT/ML         :     Current Outpatient Medications   Medication Sig    albuterol sulfate HFA (VENTOLIN HFA) 108 (90 Base) MCG/ACT inhaler Inhale 2 puffs into the lungs every 4 hours as needed for Wheezing or Shortness of Breath    ascorbic acid (VITAMIN C) 500 MG tablet Take 250 mg by mouth 3 times daily     aspirin 81 MG EC tablet Take 81 mg by mouth at bedtime     atorvastatin (LIPITOR) 20 MG tablet Take 10 mg by mouth at bedtime     vitamin D3 (CHOLECALCIFEROL) 125 MCG (5000 UT) TABS tablet Take 5,000 Units by mouth daily    glucose 4g chewable tablet Take 15 g by mouth as needed    empagliflozin (JARDIANCE) 25 MG tablet Take 25 mg by mouth daily    ferrous sulfate (FE TABS 325) 325 (65 Fe) MG EC tablet Take 325 mg by mouth 2 times daily (with meals)    fluticasone (FLONASE) 50 MCG/ACT nasal spray 2 sprays by Nasal route daily    gabapentin (NEURONTIN) 100 MG capsule Take by mouth 3 times daily. insulin aspart (NOVOLOG FLEXPEN) 100 UNIT/ML injection pen Inject 4 Units into the skin 3 times daily (before meals) If BS >120 and 150, if anything 150 \"double up\"    insulin glargine (LANTUS) 100 UNIT/ML injection vial Inject 22 Units into the skin nightly     isosorbide mononitrate (IMDUR) 60 MG extended release tablet Take 30 mg by mouth daily     nadolol (CORGARD) 40 MG tablet daily TAKE ONE TABLET BY MOUTH DAILY --FOR HEART/BLOOD PRESSURE    nitroGLYCERIN (NITROSTAT) 0.4 MG SL tablet Place under the tongue    omeprazole (PRILOSEC) 40 MG delayed release capsule Take 40 mg by mouth daily     sucralfate (CARAFATE) 1 GM tablet Take 1 g by mouth 2 times daily as needed     tamsulosin (FLOMAX) 0.4 MG capsule Take 0.4 mg by mouth at bedtime     Umeclidinium Bromide (INCRUSE ELLIPTA) 62.5 MCG/INH AEPB Inhale 1 puff into the lungs daily    CPAP Machine MISC by Does not apply route     No current facility-administered medications for this visit. Past Medical History, Past Surgical History, Family history, Social History, and Medications were all reviewed with the patient today and updated as necessary.        Allergies   Allergen Reactions    Metformin Diarrhea     Past Medical History:   Diagnosis Date    BPH (benign prostatic hyperplasia)     on the med flomax    CAD (coronary artery disease)     MI--stent x 1- 1999---- no problems since per pt-- followed by dr. Nils Johnston    Chronic back pain     Cirrhosis ([facility] Dx 7/26/16    COPD (chronic obstructive pulmonary disease) ([facility]     Diabetes mellitus ([facility]     type 2, BS     GERD (gastroesophageal reflux disease)     controlled with med    Hearing reduced     Hypercholesterolemia     Hyperlipidemia     managed by  meds    Hypertension     controlled with med    Iron deficiency anemia     Esophogeal banding    Iron deficiency anemia Malignant hyperthermia due to anesthesia     1st cousin- on mom's side--- 30 yrs ago--per pt-- no one was tested in his family-REPORT TO DR Leticia Stewart- \"My oldest sister has never had it with several surgeries\"    JERILYN on CPAP     wears cpap at hs    Thrombocytopenia (HCC)     Tinnitus     bilat    Varices, esophageal (HCC)     several bandings per pt     Past Surgical History:   Procedure Laterality Date    ANKLE FRACTURE SURGERY Right     pt has screws in r ankle    BREAST SURGERY  age 14    muscles removed in chest age 14     CERVICAL FUSION      ACDF multilevel    CHOLECYSTECTOMY, LAPAROSCOPIC          COLONOSCOPY N/A 2019    COLONOSCOPY/BMI 25 performed by Bailee Glass MD at [facility] N/A 2022    COLONOSCOPY POLYPECTOMY SNARE/COLD BIOPSY performed by Carlyle [facility]ero MD at [facility]      esophageal banding    LUMBAR LAMINECTOMY      OTHER SURGICAL HISTORY Left     gangrene left leg-- wounded in [address]    stent x1    SHOULDER ARTHROSCOPY Left     TONSILLECTOMY  as child    UPPER GASTROINTESTINAL ENDOSCOPY      multi    UPPER GASTROINTESTINAL ENDOSCOPY N/A 2022    EGD BIOPSY performed by Carlyle Romero MD at [facility] ENDOSCOPY     Family History   Problem Relation Age of Onset    No Known Problems Sister     Heart Disease Father         pace maker    Liver Disease Mother     Diabetes Mother     Other Mother         liver disease secondary to hepatitis- from transfusion    Stroke Sister     No Known Problems Sister     No Known Problems Brother     Coronary Art Dis Other         fam hx    Diabetes Sister     No Known Problems Brother       Social History     Tobacco Use    Smoking status: Former     Packs/day: 1.50     Years: 35.00     Pack years: 52.50     Types: Cigarettes     Quit date: 1999     Years since quittin.0    Smokeless tobacco: Never   Substance Use Topics    Alcohol use: Not Currently Alcohol/week: 0.0 standard drinks       ROS:    Review of Systems   Constitutional: Negative for fever.   HENT:  Negative for stridor.    Eyes:  Negative for pain.   Cardiovascular:  Negative for chest pain.   Respiratory:  Negative for cough.    Endocrine: Negative for cold intolerance.   Skin:  Negative for nail changes.   Musculoskeletal:  Negative for arthritis.   Gastrointestinal:  Negative for abdominal pain.   Genitourinary:  Negative for dysuria.   Neurological:  Negative for aphonia.   Psychiatric/Behavioral:  Negative for altered mental status.    Allergic/Immunologic: Negative for hives.         PHYSICAL EXAM:    /82   Pulse 74   Ht 5' 6\" (1.676 m)   Wt 157 lb 4.8 oz (71.4 kg)   BMI 25.39 kg/m²        Wt Readings from Last 3 Encounters:   01/11/23 157 lb 4.8 oz (71.4 kg)   10/26/22 151 lb 14.4 oz (68.9 kg)   08/02/22 150 lb (68 kg)     BP Readings from Last 3 Encounters:   01/11/23 138/82   10/26/22 116/64   08/02/22 110/60         Physical Exam  Vitals reviewed.   HENT:      Head: Normocephalic.      Right Ear: External ear normal.      Left Ear: External ear normal.      Nose: Nose normal.   Eyes:      General: No scleral icterus.  Pulmonary:      Effort: Pulmonary effort is normal.   Abdominal:      General: There is no distension.   Musculoskeletal:      Cervical back: Neck supple.   Skin:     General: Skin is warm.   Neurological:      Mental Status: He is alert. Mental status is at baseline.       Medical problems and test results were reviewed with the patient today.           No results found for this or any previous visit (from the past 672 hour(s)).  Lab Results   Component Value Date/Time    CHOL 104 04/29/2021 10:46 AM    HDL 41 04/29/2021 10:46 AM    VLDL 20 04/29/2021 10:46 AM              CRISTAL Champagne was seen today for coronary artery disease, hypertension and 6 month follow-up.    Diagnoses and all orders for this visit:    Coronary artery disease due to lipid rich plaque  -      EKG 12 Lead    Essential hypertension  -     EKG 12 Lead    Diabetes mellitus due to underlying condition with hyperglycemia, with long-term current use of insulin ([facility]    Return in about 6 months (around 7/11/2023).        Tripp Macias MD  1/11/2023  9:12 AM

## 2023-02-21 ENCOUNTER — TELEPHONE (OUTPATIENT)
Dept: FAMILY MEDICINE CLINIC | Facility: CLINIC | Age: 73
End: 2023-02-21

## 2023-03-23 LAB
DIFFERENTIAL METHOD BLD: ABNORMAL
ERYTHROCYTE [DISTWIDTH] IN BLOOD BY AUTOMATED COUNT: 14.3 % (ref 11.9–14.6)
HCT VFR BLD AUTO: 42.4 % (ref 41.1–50.3)
HGB BLD-MCNC: 14 G/DL (ref 13.6–17.2)
MCH RBC QN AUTO: 29.7 PG (ref 26.1–32.9)
MCHC RBC AUTO-ENTMCNC: 33 G/DL (ref 31.4–35)
MCV RBC AUTO: 89.8 FL (ref 82–102)
NRBC # BLD: 0 K/UL (ref 0–0.2)
PLATELET # BLD AUTO: 82 K/UL (ref 150–450)
PLATELET COMMENT: ABNORMAL
PMV BLD AUTO: 12.6 FL (ref 9.4–12.3)
RBC # BLD AUTO: 4.72 M/UL (ref 4.23–5.6)
RBC MORPH BLD: ABNORMAL
RBC MORPH BLD: ABNORMAL
WBC # BLD AUTO: 5.1 K/UL (ref 4.3–11.1)
WBC MORPH BLD: SLIGHT

## 2023-04-19 NOTE — PROGRESS NOTES
ago--per pt-- no one was tested in his family-REPORT TO DR Evan Meyer oldest sister has never had it with several surgeries\"    JERILYN on CPAP     wears cpap at hs    Thrombocytopenia (HCC)     Tinnitus     bilat    Varices, esophageal (HCC)     several bandings per pt        Tobacco Use      Smoking status: Former        Packs/day: 1.50        Years: 40.00        Pack years: 61        Types: Cigarettes        Quit date: 1999        Years since quittin.3      Smokeless tobacco: Never    Allergies   Allergen Reactions    Metformin Diarrhea     Current Outpatient Medications   Medication Instructions    albuterol sulfate HFA (VENTOLIN HFA) 108 (90 Base) MCG/ACT inhaler 2 puffs, Inhalation, EVERY 4 HOURS PRN    ascorbic acid (VITAMIN C) 250 mg, Oral, 3 TIMES DAILY    aspirin 81 mg, Oral, Nightly    atorvastatin (LIPITOR) 10 mg, Oral, Nightly    CPAP Machine MISC Does not apply    empagliflozin (JARDIANCE) 25 mg, Oral, DAILY    ferrous sulfate (FE TABS 325) 325 mg, Oral, 2 TIMES DAILY WITH MEALS    fluticasone (FLONASE) 50 MCG/ACT nasal spray 2 sprays, Nasal, DAILY    gabapentin (NEURONTIN) 100 MG capsule Oral, 3 TIMES DAILY    glucose 15 g, Oral, PRN    insulin glargine (LANTUS) 22 Units, SubCUTAneous, NIGHTLY    isosorbide mononitrate (IMDUR) 30 mg, Oral, DAILY    nadolol (CORGARD) 40 MG tablet DAILY, TAKE ONE TABLET BY MOUTH DAILY --FOR HEART/BLOOD PRESSURE    nitroGLYCERIN (NITROSTAT) 0.4 MG SL tablet SubLINGual    NovoLOG FlexPen 4 Units, SubCUTAneous, 3 TIMES DAILY BEFORE MEALS, If BS >120 and 150, if anything 150 \"double up\"    omeprazole (PRILOSEC) 40 mg, Oral, DAILY    sucralfate (CARAFATE) 1 g, Oral, 2 TIMES DAILY PRN    tamsulosin (FLOMAX) 0.4 mg, Oral, Nightly    Umeclidinium Bromide (INCRUSE ELLIPTA) 62.5 MCG/INH AEPB 1 puff, Inhalation, DAILY    vitamin D3 (CHOLECALCIFEROL) 5,000 Units, Oral, DAILY

## 2023-04-20 ENCOUNTER — OFFICE VISIT (OUTPATIENT)
Dept: PULMONOLOGY | Age: 73
End: 2023-04-20
Payer: OTHER GOVERNMENT

## 2023-04-20 VITALS
SYSTOLIC BLOOD PRESSURE: 114 MMHG | WEIGHT: 155.4 LBS | RESPIRATION RATE: 15 BRPM | TEMPERATURE: 96.9 F | HEART RATE: 67 BPM | BODY MASS INDEX: 24.98 KG/M2 | HEIGHT: 66 IN | OXYGEN SATURATION: 96 % | DIASTOLIC BLOOD PRESSURE: 62 MMHG

## 2023-04-20 DIAGNOSIS — R06.09 DYSPNEA ON EXERTION: ICD-10-CM

## 2023-04-20 DIAGNOSIS — J43.9 PULMONARY EMPHYSEMA, UNSPECIFIED EMPHYSEMA TYPE (HCC): Primary | ICD-10-CM

## 2023-04-20 DIAGNOSIS — G47.33 OSA (OBSTRUCTIVE SLEEP APNEA): ICD-10-CM

## 2023-04-20 LAB
EXPIRATORY TIME: NORMAL
FEF 25-75% %PRED-PRE: NORMAL
FEF 25-75% PRED: NORMAL
FEF 25-75%-PRE: NORMAL
FEV1 %PRED-PRE: 92 %
FEV1 PRED: NORMAL
FEV1/FVC %PRED-PRE: NORMAL
FEV1/FVC PRED: NORMAL
FEV1/FVC: 77 %
FEV1: 2.43 L
FVC %PRED-PRE: 86 %
FVC PRED: NORMAL
FVC: 3.16 L
PEF %PRED-PRE: NORMAL
PEF PRED: NORMAL
PEF-PRE: NORMAL

## 2023-04-20 PROCEDURE — 1123F ACP DISCUSS/DSCN MKR DOCD: CPT | Performed by: NURSE PRACTITIONER

## 2023-04-20 PROCEDURE — 99214 OFFICE O/P EST MOD 30 MIN: CPT | Performed by: NURSE PRACTITIONER

## 2023-04-20 PROCEDURE — 3078F DIAST BP <80 MM HG: CPT | Performed by: NURSE PRACTITIONER

## 2023-04-20 PROCEDURE — 3074F SYST BP LT 130 MM HG: CPT | Performed by: NURSE PRACTITIONER

## 2023-04-20 ASSESSMENT — PULMONARY FUNCTION TESTS
FVC: 3.16
FEV1_PERCENT_PREDICTED_PRE: 92
FEV1/FVC: 77
FVC_PERCENT_PREDICTED_PRE: 86
FEV1: 2.43

## 2023-04-25 ENCOUNTER — OFFICE VISIT (OUTPATIENT)
Dept: FAMILY MEDICINE CLINIC | Facility: CLINIC | Age: 73
End: 2023-04-25

## 2023-04-25 VITALS
OXYGEN SATURATION: 97 % | WEIGHT: 156.2 LBS | DIASTOLIC BLOOD PRESSURE: 68 MMHG | SYSTOLIC BLOOD PRESSURE: 110 MMHG | HEART RATE: 66 BPM | BODY MASS INDEX: 25.1 KG/M2 | HEIGHT: 66 IN

## 2023-04-25 DIAGNOSIS — E11.42 CONTROLLED TYPE 2 DIABETES MELLITUS WITH DIABETIC POLYNEUROPATHY, WITH LONG-TERM CURRENT USE OF INSULIN (HCC): ICD-10-CM

## 2023-04-25 DIAGNOSIS — Z00.00 MEDICARE ANNUAL WELLNESS VISIT, SUBSEQUENT: Primary | ICD-10-CM

## 2023-04-25 DIAGNOSIS — Z79.4 CONTROLLED TYPE 2 DIABETES MELLITUS WITH DIABETIC POLYNEUROPATHY, WITH LONG-TERM CURRENT USE OF INSULIN (HCC): ICD-10-CM

## 2023-04-25 LAB
BASOPHILS # BLD: 0 K/UL (ref 0–0.2)
BASOPHILS NFR BLD: 1 % (ref 0–2)
CREAT UR-MCNC: 64 MG/DL
DIFFERENTIAL METHOD BLD: ABNORMAL
EOSINOPHIL # BLD: 0.1 K/UL (ref 0–0.8)
EOSINOPHIL NFR BLD: 4 % (ref 0.5–7.8)
ERYTHROCYTE [DISTWIDTH] IN BLOOD BY AUTOMATED COUNT: 16.9 % (ref 11.9–14.6)
HCT VFR BLD AUTO: 40.4 % (ref 41.1–50.3)
HGB BLD-MCNC: 12.9 G/DL (ref 13.6–17.2)
IMM GRANULOCYTES # BLD AUTO: 0 K/UL (ref 0–0.5)
IMM GRANULOCYTES NFR BLD AUTO: 0 % (ref 0–5)
LYMPHOCYTES # BLD: 0.4 K/UL (ref 0.5–4.6)
LYMPHOCYTES NFR BLD: 11 % (ref 13–44)
MCH RBC QN AUTO: 30.1 PG (ref 26.1–32.9)
MCHC RBC AUTO-ENTMCNC: 31.9 G/DL (ref 31.4–35)
MCV RBC AUTO: 94.2 FL (ref 82–102)
MICROALBUMIN UR-MCNC: 0.66 MG/DL (ref 0–3)
MICROALBUMIN/CREAT UR-RTO: 10 MG/G (ref 0–30)
MONOCYTES # BLD: 0.5 K/UL (ref 0.1–1.3)
MONOCYTES NFR BLD: 15 % (ref 4–12)
NEUTS SEG # BLD: 2.2 K/UL (ref 1.7–8.2)
NEUTS SEG NFR BLD: 69 % (ref 43–78)
NRBC # BLD: 0 K/UL (ref 0–0.2)
PLATELET # BLD AUTO: 62 K/UL (ref 150–450)
PLATELET COMMENT: ABNORMAL
PMV BLD AUTO: 12.1 FL (ref 9.4–12.3)
RBC # BLD AUTO: 4.29 M/UL (ref 4.23–5.6)
RBC MORPH BLD: ABNORMAL
WBC # BLD AUTO: 3.2 K/UL (ref 4.3–11.1)
WBC MORPH BLD: ABNORMAL

## 2023-04-25 SDOH — ECONOMIC STABILITY: INCOME INSECURITY: HOW HARD IS IT FOR YOU TO PAY FOR THE VERY BASICS LIKE FOOD, HOUSING, MEDICAL CARE, AND HEATING?: NOT HARD AT ALL

## 2023-04-25 SDOH — ECONOMIC STABILITY: HOUSING INSECURITY
IN THE LAST 12 MONTHS, WAS THERE A TIME WHEN YOU DID NOT HAVE A STEADY PLACE TO SLEEP OR SLEPT IN A SHELTER (INCLUDING NOW)?: NO

## 2023-04-25 SDOH — ECONOMIC STABILITY: FOOD INSECURITY: WITHIN THE PAST 12 MONTHS, YOU WORRIED THAT YOUR FOOD WOULD RUN OUT BEFORE YOU GOT MONEY TO BUY MORE.: NEVER TRUE

## 2023-04-25 SDOH — ECONOMIC STABILITY: FOOD INSECURITY: WITHIN THE PAST 12 MONTHS, THE FOOD YOU BOUGHT JUST DIDN'T LAST AND YOU DIDN'T HAVE MONEY TO GET MORE.: NEVER TRUE

## 2023-04-25 ASSESSMENT — PATIENT HEALTH QUESTIONNAIRE - PHQ9
SUM OF ALL RESPONSES TO PHQ QUESTIONS 1-9: 0
2. FEELING DOWN, DEPRESSED OR HOPELESS: 0
SUM OF ALL RESPONSES TO PHQ QUESTIONS 1-9: 0
SUM OF ALL RESPONSES TO PHQ9 QUESTIONS 1 & 2: 0
1. LITTLE INTEREST OR PLEASURE IN DOING THINGS: 0

## 2023-04-25 NOTE — PROGRESS NOTES
Medicare Annual Wellness Visit    Elisa Schuler is here for Medicare AWV    Assessment & Plan     Medicare annual wellness visit, subsequent   -Updated problem list, care team and history  -Patient receives most of care from the [facility] [address] are up to date  -Last colonoscopy 2022. Recommendations for Preventive Services Due: see orders and patient instructions/AVS.  Recommended screening schedule for the next 5-10 years is provided to the patient in written form: see Patient Instructions/AVS.     No follow-ups on file. Subjective       Patient's complete Health Risk Assessment and screening values have been reviewed and are found in Flowsheets. The following problems were reviewed today and where indicated follow up appointments were made and/or referrals ordered. Positive Risk Factor Screenings with Interventions:                                       Objective   Vitals:    04/25/23 0839   BP: 110/68   Pulse: 66   SpO2: 97%   Weight: 156 lb 3.2 oz (70.9 kg)   Height: 5' 6\" (1.676 m)      Body mass index is 25.21 kg/m². Allergies   Allergen Reactions    Metformin Diarrhea     Prior to Visit Medications    Medication Sig Taking?  Authorizing Provider   albuterol sulfate HFA (VENTOLIN HFA) 108 (90 Base) MCG/ACT inhaler Inhale 2 puffs into the lungs every 4 hours as needed for Wheezing or Shortness of Breath  Lino Charles, APRN - CNP   CPAP Machine MISC by Does not apply route  Historical Provider, MD   ascorbic acid (VITAMIN C) 500 MG tablet Take 0.5 tablets by mouth 3 times daily  Ar Automatic Reconciliation   aspirin 81 MG EC tablet Take 1 tablet by mouth at bedtime  Ar Automatic Reconciliation   atorvastatin (LIPITOR) 20 MG tablet Take 0.5 tablets by mouth at bedtime  Ar Automatic Reconciliation   vitamin D3 (CHOLECALCIFEROL) 125 MCG (5000 UT) TABS tablet Take 1 tablet by mouth daily  Ar Automatic Reconciliation   glucose 4g chewable tablet Take 15 g by mouth as needed  Ar Automatic

## 2023-04-26 LAB
ALBUMIN SERPL-MCNC: 3.4 G/DL (ref 3.2–4.6)
ALBUMIN/GLOB SERPL: 0.9 (ref 0.4–1.6)
ALP SERPL-CCNC: 68 U/L (ref 50–136)
ALT SERPL-CCNC: 24 U/L (ref 12–65)
ANION GAP SERPL CALC-SCNC: 4 MMOL/L (ref 2–11)
AST SERPL-CCNC: 27 U/L (ref 15–37)
BILIRUB SERPL-MCNC: 0.5 MG/DL (ref 0.2–1.1)
BUN SERPL-MCNC: 11 MG/DL (ref 8–23)
CALCIUM SERPL-MCNC: 8.9 MG/DL (ref 8.3–10.4)
CHLORIDE SERPL-SCNC: 109 MMOL/L (ref 101–110)
CHOLEST SERPL-MCNC: 88 MG/DL
CO2 SERPL-SCNC: 28 MMOL/L (ref 21–32)
CREAT SERPL-MCNC: 0.8 MG/DL (ref 0.8–1.5)
EST. AVERAGE GLUCOSE BLD GHB EST-MCNC: 131 MG/DL
GLOBULIN SER CALC-MCNC: 3.8 G/DL (ref 2.8–4.5)
GLUCOSE SERPL-MCNC: 118 MG/DL (ref 65–100)
HBA1C MFR BLD: 6.2 % (ref 4.8–5.6)
HDLC SERPL-MCNC: 44 MG/DL (ref 40–60)
HDLC SERPL: 2
LDLC SERPL CALC-MCNC: 26.2 MG/DL
POTASSIUM SERPL-SCNC: 3.9 MMOL/L (ref 3.5–5.1)
PROT SERPL-MCNC: 7.2 G/DL (ref 6.3–8.2)
SODIUM SERPL-SCNC: 141 MMOL/L (ref 133–143)
TRIGL SERPL-MCNC: 89 MG/DL (ref 35–150)
TSH, 3RD GENERATION: 1.88 UIU/ML (ref 0.36–3.74)
VLDLC SERPL CALC-MCNC: 17.8 MG/DL (ref 6–23)

## 2023-04-26 ASSESSMENT — ENCOUNTER SYMPTOMS
SHORTNESS OF BREATH: 0
DIARRHEA: 0
WHEEZING: 0
CONSTIPATION: 0
CHEST TIGHTNESS: 0

## 2023-07-20 ENCOUNTER — OFFICE VISIT (OUTPATIENT)
Age: 73
End: 2023-07-20
Payer: MEDICARE

## 2023-07-20 VITALS
WEIGHT: 156.9 LBS | HEART RATE: 66 BPM | BODY MASS INDEX: 25.22 KG/M2 | HEIGHT: 66 IN | DIASTOLIC BLOOD PRESSURE: 74 MMHG | SYSTOLIC BLOOD PRESSURE: 118 MMHG | OXYGEN SATURATION: 96 %

## 2023-07-20 DIAGNOSIS — I25.10 CORONARY ARTERY DISEASE DUE TO LIPID RICH PLAQUE: ICD-10-CM

## 2023-07-20 DIAGNOSIS — I10 ESSENTIAL HYPERTENSION: ICD-10-CM

## 2023-07-20 DIAGNOSIS — K74.60 HEPATIC CIRRHOSIS, UNSPECIFIED HEPATIC CIRRHOSIS TYPE, UNSPECIFIED WHETHER ASCITES PRESENT (HCC): ICD-10-CM

## 2023-07-20 DIAGNOSIS — E78.5 HYPERLIPIDEMIA, UNSPECIFIED HYPERLIPIDEMIA TYPE: ICD-10-CM

## 2023-07-20 DIAGNOSIS — Z79.4 DIABETES MELLITUS DUE TO UNDERLYING CONDITION WITH HYPERGLYCEMIA, WITH LONG-TERM CURRENT USE OF INSULIN (HCC): Primary | ICD-10-CM

## 2023-07-20 DIAGNOSIS — I25.83 CORONARY ARTERY DISEASE DUE TO LIPID RICH PLAQUE: ICD-10-CM

## 2023-07-20 DIAGNOSIS — E08.65 DIABETES MELLITUS DUE TO UNDERLYING CONDITION WITH HYPERGLYCEMIA, WITH LONG-TERM CURRENT USE OF INSULIN (HCC): Primary | ICD-10-CM

## 2023-07-20 PROCEDURE — 1123F ACP DISCUSS/DSCN MKR DOCD: CPT | Performed by: INTERNAL MEDICINE

## 2023-07-20 PROCEDURE — 99214 OFFICE O/P EST MOD 30 MIN: CPT | Performed by: INTERNAL MEDICINE

## 2023-07-20 PROCEDURE — 3078F DIAST BP <80 MM HG: CPT | Performed by: INTERNAL MEDICINE

## 2023-07-20 PROCEDURE — 3074F SYST BP LT 130 MM HG: CPT | Performed by: INTERNAL MEDICINE

## 2023-07-20 ASSESSMENT — ENCOUNTER SYMPTOMS
NAIL CHANGES: 0
STRIDOR: 0
EYE PAIN: 0
COUGH: 0
APHONIA: 0
ABDOMINAL PAIN: 0

## 2023-07-20 NOTE — PROGRESS NOTES
ascites present [facility])  -     Amb External Referral To Gastroenterology      Return in about 6 months (around 1/20/2024).        Merribeth Hamman, MD  7/20/2023  9:39 AM

## 2023-09-08 ENCOUNTER — TELEPHONE (OUTPATIENT)
Dept: PULMONOLOGY | Age: 73
End: 2023-09-08

## 2023-09-08 NOTE — TELEPHONE ENCOUNTER
Patient woke up this morning about 4:00 choking.   He had fluid from stomach come up in his mouth and was coughing about 20 minutes

## 2023-09-11 NOTE — TELEPHONE ENCOUNTER
Able to reach patient regarding coughing episode that woke him up; describes it being started by some liquid from his stomach that came up, reports over the last month has had increased coughing when laying down, especially in the evenings. States since the long coughing episode has been better, but feels ok since has appt next month. Review of chart shows no appt set up, scheduled for f/u so that his wife can come with patient.

## 2023-10-05 ENCOUNTER — OFFICE VISIT (OUTPATIENT)
Dept: PULMONOLOGY | Age: 73
End: 2023-10-05
Payer: OTHER GOVERNMENT

## 2023-10-05 VITALS
SYSTOLIC BLOOD PRESSURE: 124 MMHG | DIASTOLIC BLOOD PRESSURE: 70 MMHG | WEIGHT: 162.2 LBS | OXYGEN SATURATION: 99 % | BODY MASS INDEX: 26.07 KG/M2 | HEART RATE: 65 BPM | TEMPERATURE: 98.3 F | RESPIRATION RATE: 18 BRPM | HEIGHT: 66 IN

## 2023-10-05 DIAGNOSIS — J43.9 PULMONARY EMPHYSEMA, UNSPECIFIED EMPHYSEMA TYPE (HCC): Primary | ICD-10-CM

## 2023-10-05 DIAGNOSIS — Z71.85 IMMUNIZATION COUNSELING: ICD-10-CM

## 2023-10-05 DIAGNOSIS — K21.9 GASTROESOPHAGEAL REFLUX DISEASE WITHOUT ESOPHAGITIS: ICD-10-CM

## 2023-10-05 PROCEDURE — 3078F DIAST BP <80 MM HG: CPT | Performed by: NURSE PRACTITIONER

## 2023-10-05 PROCEDURE — 3074F SYST BP LT 130 MM HG: CPT | Performed by: NURSE PRACTITIONER

## 2023-10-05 PROCEDURE — 1123F ACP DISCUSS/DSCN MKR DOCD: CPT | Performed by: NURSE PRACTITIONER

## 2023-10-05 PROCEDURE — 99214 OFFICE O/P EST MOD 30 MIN: CPT | Performed by: NURSE PRACTITIONER

## 2023-10-05 RX ORDER — OMEPRAZOLE 40 MG/1
40 CAPSULE, DELAYED RELEASE ORAL 2 TIMES DAILY
Qty: 180 CAPSULE | Refills: 3 | Status: SHIPPED | OUTPATIENT
Start: 2023-10-05

## 2023-10-05 RX ORDER — ALBUTEROL SULFATE 90 UG/1
2 AEROSOL, METERED RESPIRATORY (INHALATION) EVERY 4 HOURS PRN
Qty: 3 EACH | Refills: 3 | Status: SHIPPED | OUTPATIENT
Start: 2023-10-05

## 2023-10-05 NOTE — PATIENT INSTRUCTIONS
Omeprazole 40 mg twice daily. GERD instructions--  1. Limit caffeine and alcohol consumption to one serving daily. May need to eliminate totally if symptoms do not resolve. 2.  Avoid known triggers. 3.  Elevate head of bed on 6 inch blocks. 4.  Avoid eating and drinking within 2 hours of bedtime. 5.  Avoid spicy and fried foods.

## 2023-11-06 ENCOUNTER — OFFICE VISIT (OUTPATIENT)
Dept: FAMILY MEDICINE CLINIC | Facility: CLINIC | Age: 73
End: 2023-11-06
Payer: OTHER GOVERNMENT

## 2023-11-06 VITALS
HEIGHT: 66 IN | TEMPERATURE: 97.6 F | WEIGHT: 159.6 LBS | HEART RATE: 67 BPM | OXYGEN SATURATION: 97 % | BODY MASS INDEX: 25.65 KG/M2 | SYSTOLIC BLOOD PRESSURE: 106 MMHG | DIASTOLIC BLOOD PRESSURE: 68 MMHG

## 2023-11-06 DIAGNOSIS — K74.5 BILIARY CIRRHOSIS (HCC): Primary | ICD-10-CM

## 2023-11-06 DIAGNOSIS — I25.83 CORONARY ARTERY DISEASE DUE TO LIPID RICH PLAQUE: ICD-10-CM

## 2023-11-06 DIAGNOSIS — E11.42 CONTROLLED TYPE 2 DIABETES MELLITUS WITH DIABETIC POLYNEUROPATHY, WITH LONG-TERM CURRENT USE OF INSULIN (HCC): ICD-10-CM

## 2023-11-06 DIAGNOSIS — E78.5 HYPERLIPIDEMIA, UNSPECIFIED HYPERLIPIDEMIA TYPE: ICD-10-CM

## 2023-11-06 DIAGNOSIS — J44.9 CHRONIC OBSTRUCTIVE PULMONARY DISEASE, UNSPECIFIED COPD TYPE (HCC): ICD-10-CM

## 2023-11-06 DIAGNOSIS — I10 ESSENTIAL HYPERTENSION: ICD-10-CM

## 2023-11-06 DIAGNOSIS — I85.00 ESOPHAGEAL VARICES WITHOUT BLEEDING, UNSPECIFIED ESOPHAGEAL VARICES TYPE (HCC): ICD-10-CM

## 2023-11-06 DIAGNOSIS — Z79.4 CONTROLLED TYPE 2 DIABETES MELLITUS WITH DIABETIC POLYNEUROPATHY, WITH LONG-TERM CURRENT USE OF INSULIN (HCC): ICD-10-CM

## 2023-11-06 DIAGNOSIS — D50.0 IRON DEFICIENCY ANEMIA DUE TO CHRONIC BLOOD LOSS: ICD-10-CM

## 2023-11-06 DIAGNOSIS — I25.10 CORONARY ARTERY DISEASE DUE TO LIPID RICH PLAQUE: ICD-10-CM

## 2023-11-06 DIAGNOSIS — K21.9 GERD WITHOUT ESOPHAGITIS: ICD-10-CM

## 2023-11-06 PROCEDURE — 1123F ACP DISCUSS/DSCN MKR DOCD: CPT | Performed by: FAMILY MEDICINE

## 2023-11-06 PROCEDURE — 3044F HG A1C LEVEL LT 7.0%: CPT | Performed by: FAMILY MEDICINE

## 2023-11-06 PROCEDURE — 3078F DIAST BP <80 MM HG: CPT | Performed by: FAMILY MEDICINE

## 2023-11-06 PROCEDURE — 99214 OFFICE O/P EST MOD 30 MIN: CPT | Performed by: FAMILY MEDICINE

## 2023-11-06 PROCEDURE — 3074F SYST BP LT 130 MM HG: CPT | Performed by: FAMILY MEDICINE

## 2023-11-06 RX ORDER — FAMOTIDINE 40 MG/1
40 TABLET, FILM COATED ORAL DAILY
COMMUNITY

## 2023-11-06 RX ORDER — CETIRIZINE HYDROCHLORIDE 10 MG/1
10 TABLET ORAL DAILY
COMMUNITY

## 2023-11-06 ASSESSMENT — ENCOUNTER SYMPTOMS
SHORTNESS OF BREATH: 0
CHEST TIGHTNESS: 0
WHEEZING: 0
COUGH: 1

## 2023-11-06 ASSESSMENT — PATIENT HEALTH QUESTIONNAIRE - PHQ9
SUM OF ALL RESPONSES TO PHQ QUESTIONS 1-9: 0
1. LITTLE INTEREST OR PLEASURE IN DOING THINGS: 0
SUM OF ALL RESPONSES TO PHQ QUESTIONS 1-9: 0
2. FEELING DOWN, DEPRESSED OR HOPELESS: 0
SUM OF ALL RESPONSES TO PHQ9 QUESTIONS 1 & 2: 0

## 2023-11-06 NOTE — PROGRESS NOTES
Scout Steiner is a 75 y.o. male who presents today for the following:  Chief Complaint   Patient presents with    Follow-up     6 month follow up        Allergies   Allergen Reactions    Metformin Diarrhea       Current Outpatient Medications   Medication Sig Dispense Refill    albuterol sulfate HFA (VENTOLIN HFA) 108 (90 Base) MCG/ACT inhaler Inhale 2 puffs into the lungs every 4 hours as needed for Wheezing or Shortness of Breath Dx code j44.9 3 each 3    umeclidinium bromide (INCRUSE ELLIPTA) 62.5 MCG/ACT inhaler Inhale 1 puff into the lungs daily Dx code j44.9 3 each 3    omeprazole (PRILOSEC) 40 MG delayed release capsule Take 1 capsule by mouth 2 times daily 180 capsule 3    CPAP Machine MISC by Does not apply route      ascorbic acid (VITAMIN C) 500 MG tablet Take 0.5 tablets by mouth 3 times daily      aspirin 81 MG EC tablet Take 1 tablet by mouth at bedtime      atorvastatin (LIPITOR) 20 MG tablet Take 0.5 tablets by mouth at bedtime      vitamin D3 (CHOLECALCIFEROL) 125 MCG (5000 UT) TABS tablet Take 1 tablet by mouth daily      glucose 4g chewable tablet Take 15 g by mouth as needed      empagliflozin (JARDIANCE) 25 MG tablet Take 1 tablet by mouth daily      ferrous sulfate (FE TABS 325) 325 (65 Fe) MG EC tablet Take 1 tablet by mouth 2 times daily (with meals)      fluticasone (FLONASE) 50 MCG/ACT nasal spray 2 sprays by Nasal route daily      gabapentin (NEURONTIN) 100 MG capsule Take by mouth 3 times daily.       insulin aspart (NOVOLOG FLEXPEN) 100 UNIT/ML injection pen Inject 4 Units into the skin 3 times daily (before meals) If BS >120 and 150, if anything 150 \"double up\"      insulin glargine (LANTUS) 100 UNIT/ML injection vial Inject 22 Units into the skin nightly      isosorbide mononitrate (IMDUR) 60 MG extended release tablet Take 0.5 tablets by mouth daily      nadolol (CORGARD) 40 MG tablet daily TAKE ONE TABLET BY MOUTH DAILY --FOR HEART/BLOOD PRESSURE      nitroGLYCERIN (NITROSTAT) 0.4

## 2023-11-22 ENCOUNTER — TELEPHONE (OUTPATIENT)
Dept: PULMONOLOGY | Age: 73
End: 2023-11-22

## 2023-12-08 ENCOUNTER — OFFICE VISIT (OUTPATIENT)
Dept: ORTHOPEDIC SURGERY | Age: 73
End: 2023-12-08

## 2023-12-08 VITALS — BODY MASS INDEX: 26.03 KG/M2 | WEIGHT: 162 LBS | HEIGHT: 66 IN

## 2023-12-08 DIAGNOSIS — M47.816 LUMBAR SPONDYLOSIS: ICD-10-CM

## 2023-12-08 DIAGNOSIS — M54.16 LUMBAR RADICULOPATHY: Primary | ICD-10-CM

## 2023-12-08 NOTE — PROGRESS NOTES
Name: Simin Fritz  YOB: 1950  Gender: male  MRN: [id number]    CC:   Chief Complaint   Patient presents with    New Patient     Low back pain          HPI:   Simin Fritz is a 75 y.o. male with a PMHx of multiple comorbidities including cirrhosis of the liver, gastric polyps that cause slow bleeding constantly, previous MI, thrombocytopenia, diabetes, among other comorbidities listed below. They present here for evaluation of back pain rating to the right leg. This has been going on approximately 3 days. He does have a remote history of neck and lower back surgery that sounds like a lumbar laminectomy performed around 2008 for his lower back. Patient reports that approximately 3 days ago he began having shooting pain down his right leg lateral aspect of it just distal to his knee but not quite to the foot. Unfortunately he is quite limited in medications that he can use for pain. He is not permitted to take Tylenol or NSAIDs. He has been using topical lidocaine patches. He states currently he is not on Eliquis. Since his pain started he has been less ambulatory. He is limited to really using a wheelchair at this point, because he has difficulty with weightbearing on the right leg. This causes pain shooting down his leg. He has a history of groin pain in the past on the right, but this is longstanding and not recent.           Past Medical History Includes:   Past Medical History:   Diagnosis Date    BPH (benign prostatic hyperplasia)     on the med flomax    CAD (coronary artery disease)     MI--stent x 1- 1999---- no problems since per pt-- followed by dr. Ketty Morocho    Chronic back pain     Cirrhosis ([facility]) Dx 7/26/16    COPD (chronic obstructive pulmonary disease) ([facility])     Diabetes mellitus ([facility])     type 2, BS     GERD (gastroesophageal reflux disease)     controlled with med    GIB (gastrointestinal bleeding) 5/23/2016    Hearing reduced     Hypercholesterolemia

## 2023-12-12 ENCOUNTER — CLINICAL DOCUMENTATION (OUTPATIENT)
Dept: ORTHOPEDIC SURGERY | Age: 73
End: 2023-12-12

## 2023-12-14 ENCOUNTER — TELEPHONE (OUTPATIENT)
Dept: ORTHOPEDIC SURGERY | Age: 73
End: 2023-12-14

## 2023-12-14 NOTE — TELEPHONE ENCOUNTER
He needs his last office note faxed to his [address] @ # [phone]. He also needs a RX on Lidocaine patches faxed to that same number so he can get through the [place].

## 2024-02-06 ENCOUNTER — TELEPHONE (OUTPATIENT)
Age: 74
End: 2024-02-06

## 2024-02-08 ENCOUNTER — OFFICE VISIT (OUTPATIENT)
Age: 74
End: 2024-02-08

## 2024-02-08 VITALS
HEIGHT: 66 IN | BODY MASS INDEX: 26.68 KG/M2 | HEART RATE: 65 BPM | WEIGHT: 166 LBS | SYSTOLIC BLOOD PRESSURE: 110 MMHG | DIASTOLIC BLOOD PRESSURE: 70 MMHG

## 2024-02-08 DIAGNOSIS — Z79.4 DIABETES MELLITUS DUE TO UNDERLYING CONDITION WITH HYPERGLYCEMIA, WITH LONG-TERM CURRENT USE OF INSULIN (HCC): Primary | ICD-10-CM

## 2024-02-08 DIAGNOSIS — E08.65 DIABETES MELLITUS DUE TO UNDERLYING CONDITION WITH HYPERGLYCEMIA, WITH LONG-TERM CURRENT USE OF INSULIN (HCC): Primary | ICD-10-CM

## 2024-02-08 DIAGNOSIS — I25.10 CORONARY ARTERY DISEASE INVOLVING NATIVE CORONARY ARTERY OF NATIVE HEART WITHOUT ANGINA PECTORIS: ICD-10-CM

## 2024-02-08 DIAGNOSIS — I10 ESSENTIAL HYPERTENSION: ICD-10-CM

## 2024-02-08 ASSESSMENT — ENCOUNTER SYMPTOMS
ABDOMINAL PAIN: 0
STRIDOR: 0
EYE PAIN: 0
APHONIA: 0
NAIL CHANGES: 0
COUGH: 0

## 2024-02-08 NOTE — PROGRESS NOTES
[facility], [address], [address]  [place] [address]  PHONE: [phone]    SUBJECTIVE:   Ihsan Domingo is a 73 y.o. male 1950   seen for a follow up visit regarding the following:     Chief Complaint   Patient presents with    6 Month Follow-Up    Coronary Artery Disease         History of present illness: 73 y.o. male presented for follow-up 2/8/24   history of splenic vein thrombosis recent imaging 3/2022.  History of cirrhosis.  Labs reviewed 11/21/2023 normal hemoglobin renal function normal.  Patient with chronic thrombocytopenia platelets 88.  Presents for follow-up      Interval hx    Cath done 2020 due to sx  simliar to last sent  HGb stable.         catheterization was in 1999.  Patient presented to review stress test results.      He was originally seen in 2016.  He has a previous history of stenting to unknown vessel in 1999  Additionally, history of microcytic anemia that was evaluated with bone marrow biopsy and iron studies.  He is followed by gastroenterology for cryptogenic  cirrhosis, esophageal varices that have been banded in the past.  He is also followed by [facility] for his anemia.        He presented 12/05/2017.  He underwent stress perfusion imaging in 12/2017, which was within normal limits.          Cardiac and other Notable History:      Cirrhosis diagnosed in 2016, cryptogenic with varices.      Echocardiogram 12/2016 normal left ventricular systolic function normal diastolic function RVSP 34 mmHg    12/01/2017 upper endoscopy.  Impression - esophagus evidence of prior banding, multiple ulcerated polyps in stomach, PHG without bleeding.      11/13/2017 hemoglobin 13, platelets 99.      12/2017 Lexiscan perfusion imaging normal.      4/30/20 Sinus  Bradycardia  Low voltage in precordial leads.     5/2020 Fixed defect on stress perfusion study.     2020 carotids with <50% stenosis     2020 Cath stable anatomy with patent stent

## 2024-05-07 ENCOUNTER — OFFICE VISIT (OUTPATIENT)
Dept: FAMILY MEDICINE CLINIC | Facility: CLINIC | Age: 74
End: 2024-05-07
Payer: MEDICARE

## 2024-05-07 VITALS
SYSTOLIC BLOOD PRESSURE: 118 MMHG | TEMPERATURE: 98.4 F | DIASTOLIC BLOOD PRESSURE: 72 MMHG | WEIGHT: 159 LBS | OXYGEN SATURATION: 96 % | HEART RATE: 60 BPM | BODY MASS INDEX: 25.66 KG/M2

## 2024-05-07 DIAGNOSIS — I10 ESSENTIAL HYPERTENSION: ICD-10-CM

## 2024-05-07 DIAGNOSIS — D50.0 IRON DEFICIENCY ANEMIA DUE TO CHRONIC BLOOD LOSS: ICD-10-CM

## 2024-05-07 DIAGNOSIS — E11.42 CONTROLLED TYPE 2 DIABETES MELLITUS WITH DIABETIC POLYNEUROPATHY, WITH LONG-TERM CURRENT USE OF INSULIN (HCC): ICD-10-CM

## 2024-05-07 DIAGNOSIS — Z79.4 CONTROLLED TYPE 2 DIABETES MELLITUS WITH DIABETIC POLYNEUROPATHY, WITH LONG-TERM CURRENT USE OF INSULIN (HCC): ICD-10-CM

## 2024-05-07 DIAGNOSIS — I85.00 ESOPHAGEAL VARICES WITHOUT BLEEDING, UNSPECIFIED ESOPHAGEAL VARICES TYPE (HCC): ICD-10-CM

## 2024-05-07 DIAGNOSIS — Z00.00 MEDICARE ANNUAL WELLNESS VISIT, SUBSEQUENT: Primary | ICD-10-CM

## 2024-05-07 DIAGNOSIS — E78.5 HYPERLIPIDEMIA, UNSPECIFIED HYPERLIPIDEMIA TYPE: ICD-10-CM

## 2024-05-07 LAB
ALBUMIN SERPL-MCNC: 3.8 G/DL (ref 3.2–4.6)
ALBUMIN/GLOB SERPL: 1.1 (ref 1–1.9)
ALP SERPL-CCNC: 63 U/L (ref 40–129)
ALT SERPL-CCNC: 20 U/L (ref 12–65)
ANION GAP SERPL CALC-SCNC: 8 MMOL/L (ref 9–18)
AST SERPL-CCNC: 36 U/L (ref 15–37)
BASOPHILS # BLD: 0 K/UL (ref 0–0.2)
BASOPHILS NFR BLD: 1 % (ref 0–2)
BILIRUB SERPL-MCNC: 0.7 MG/DL (ref 0–1.2)
BUN SERPL-MCNC: 13 MG/DL (ref 8–23)
CALCIUM SERPL-MCNC: 9.3 MG/DL (ref 8.8–10.2)
CHLORIDE SERPL-SCNC: 103 MMOL/L (ref 98–107)
CHOLEST SERPL-MCNC: 96 MG/DL (ref 0–200)
CO2 SERPL-SCNC: 27 MMOL/L (ref 20–28)
CREAT SERPL-MCNC: 0.79 MG/DL (ref 0.8–1.3)
DIFFERENTIAL METHOD BLD: ABNORMAL
EOSINOPHIL # BLD: 0.2 K/UL (ref 0–0.8)
EOSINOPHIL NFR BLD: 4 % (ref 0.5–7.8)
ERYTHROCYTE [DISTWIDTH] IN BLOOD BY AUTOMATED COUNT: 17.7 % (ref 11.9–14.6)
EST. AVERAGE GLUCOSE BLD GHB EST-MCNC: 166 MG/DL
GLOBULIN SER CALC-MCNC: 3.4 G/DL (ref 2.3–3.5)
GLUCOSE SERPL-MCNC: 231 MG/DL (ref 70–99)
HBA1C MFR BLD: 7.4 % (ref 0–5.6)
HCT VFR BLD AUTO: 43.3 % (ref 41.1–50.3)
HDLC SERPL-MCNC: 40 MG/DL (ref 40–60)
HDLC SERPL: 2.4 (ref 0–5)
HGB BLD-MCNC: 13.7 G/DL (ref 13.6–17.2)
IMM GRANULOCYTES # BLD AUTO: 0 K/UL (ref 0–0.5)
IMM GRANULOCYTES NFR BLD AUTO: 0 % (ref 0–5)
LDLC SERPL CALC-MCNC: 25 MG/DL (ref 0–100)
LYMPHOCYTES # BLD: 0.4 K/UL (ref 0.5–4.6)
LYMPHOCYTES NFR BLD: 9 % (ref 13–44)
MCH RBC QN AUTO: 29.4 PG (ref 26.1–32.9)
MCHC RBC AUTO-ENTMCNC: 31.6 G/DL (ref 31.4–35)
MCV RBC AUTO: 92.9 FL (ref 82–102)
MONOCYTES # BLD: 0.6 K/UL (ref 0.1–1.3)
MONOCYTES NFR BLD: 15 % (ref 4–12)
NEUTS SEG # BLD: 2.8 K/UL (ref 1.7–8.2)
NEUTS SEG NFR BLD: 71 % (ref 43–78)
NRBC # BLD: 0 K/UL (ref 0–0.2)
PLATELET # BLD AUTO: 68 K/UL (ref 150–450)
PMV BLD AUTO: 11.6 FL (ref 9.4–12.3)
POTASSIUM SERPL-SCNC: 4.1 MMOL/L (ref 3.5–5.1)
PROT SERPL-MCNC: 7.2 G/DL (ref 6.3–8.2)
RBC # BLD AUTO: 4.66 M/UL (ref 4.23–5.6)
SODIUM SERPL-SCNC: 138 MMOL/L (ref 136–145)
TRIGL SERPL-MCNC: 156 MG/DL (ref 0–150)
TSH W FREE THYROID IF ABNORMAL: 2.5 UIU/ML (ref 0.27–4.2)
VLDLC SERPL CALC-MCNC: 31 MG/DL (ref 6–23)
WBC # BLD AUTO: 3.9 K/UL (ref 4.3–11.1)

## 2024-05-07 PROCEDURE — 99214 OFFICE O/P EST MOD 30 MIN: CPT | Performed by: FAMILY MEDICINE

## 2024-05-07 PROCEDURE — 3074F SYST BP LT 130 MM HG: CPT | Performed by: FAMILY MEDICINE

## 2024-05-07 PROCEDURE — G0439 PPPS, SUBSEQ VISIT: HCPCS | Performed by: FAMILY MEDICINE

## 2024-05-07 PROCEDURE — 3078F DIAST BP <80 MM HG: CPT | Performed by: FAMILY MEDICINE

## 2024-05-07 PROCEDURE — 1123F ACP DISCUSS/DSCN MKR DOCD: CPT | Performed by: FAMILY MEDICINE

## 2024-05-07 RX ORDER — LIDOCAINE 50 MG/G
PATCH TOPICAL
COMMUNITY
Start: 2023-12-29

## 2024-05-07 SDOH — ECONOMIC STABILITY: FOOD INSECURITY: WITHIN THE PAST 12 MONTHS, THE FOOD YOU BOUGHT JUST DIDN'T LAST AND YOU DIDN'T HAVE MONEY TO GET MORE.: NEVER TRUE

## 2024-05-07 SDOH — ECONOMIC STABILITY: FOOD INSECURITY: WITHIN THE PAST 12 MONTHS, YOU WORRIED THAT YOUR FOOD WOULD RUN OUT BEFORE YOU GOT MONEY TO BUY MORE.: NEVER TRUE

## 2024-05-07 SDOH — ECONOMIC STABILITY: INCOME INSECURITY: HOW HARD IS IT FOR YOU TO PAY FOR THE VERY BASICS LIKE FOOD, HOUSING, MEDICAL CARE, AND HEATING?: NOT HARD AT ALL

## 2024-05-07 ASSESSMENT — LIFESTYLE VARIABLES
HOW MANY STANDARD DRINKS CONTAINING ALCOHOL DO YOU HAVE ON A TYPICAL DAY: PATIENT DOES NOT DRINK
HOW OFTEN DO YOU HAVE A DRINK CONTAINING ALCOHOL: NEVER

## 2024-05-07 ASSESSMENT — ENCOUNTER SYMPTOMS
DIARRHEA: 0
SHORTNESS OF BREATH: 0
CHEST TIGHTNESS: 0
CONSTIPATION: 0
WHEEZING: 0

## 2024-05-07 ASSESSMENT — PATIENT HEALTH QUESTIONNAIRE - PHQ9
SUM OF ALL RESPONSES TO PHQ QUESTIONS 1-9: 0
SUM OF ALL RESPONSES TO PHQ9 QUESTIONS 1 & 2: 0
1. LITTLE INTEREST OR PLEASURE IN DOING THINGS: NOT AT ALL
SUM OF ALL RESPONSES TO PHQ QUESTIONS 1-9: 0
SUM OF ALL RESPONSES TO PHQ QUESTIONS 1-9: 0
2. FEELING DOWN, DEPRESSED OR HOPELESS: NOT AT ALL
SUM OF ALL RESPONSES TO PHQ QUESTIONS 1-9: 0

## 2024-05-07 NOTE — PATIENT INSTRUCTIONS
a person is saying. Face the person you are talking to, and have them face you. Make sure the lighting is good. You need to see the other person's face clearly.  Think about counseling if you need help to adjust to your hearing loss.  When should you call for help?  Watch closely for changes in your health, and be sure to contact your doctor if:    You think your hearing is getting worse.     You have new symptoms, such as dizziness or nausea.   Where can you learn more?  Go to https://www.[employer].net/patientEd and enter R798 to learn more about \"Hearing Loss: Care Instructions.\"  Current as of: September 27, 2023               Content Version: 14.0  © 2006-2024 [employer].   Care instructions adapted under license by [employer]. If you have questions about a medical condition or this instruction, always ask your healthcare professional. [employer] disclaims any warranty or liability for your use of this information.           Advance Directives: Care Instructions  Overview  An advance directive is a legal way to state your wishes at the end of your life. It tells your family and your doctor what to do if you can't say what you want.  There are two main types of advance directives. You can change them any time your wishes change.  Living will.  This form tells your family and your doctor your wishes about life support and other treatment. The form is also called a declaration.  Medical power of .  This form lets you name a person to make treatment decisions for you when you can't speak for yourself. This person is called a health care agent (health care proxy, health care surrogate). The form is also called a durable power of  for health care.  If you do not have an advance directive, decisions about your medical care may be made by a family member, or by a doctor or a  who doesn't know you.  It may help to think of an advance directive as a gift to the people

## 2024-05-07 NOTE — ASSESSMENT & PLAN NOTE
Managed by [facility]. Using freestyle pavithra to monitor blood sugars.  Requests to have labs done here.  Denies severe hypoglycemia.  Discussed importance of taking novolog prior to meals instead of after eating.

## 2024-05-07 NOTE — PROGRESS NOTES
Medicare Annual Wellness Visit    Ihsan Domingo is here for Medicare AWV (Pt presents today for AWV. )    Assessment & Plan   Medicare annual wellness visit, subsequent  -updated problem list, care team and history  -discussed recommended vaccines.  Declines.    Recommendations for Preventive Services Due: see orders and patient instructions/AVS.  Recommended screening schedule for the next 5-10 years is provided to the patient in written form: see Patient Instructions/AVS.     No follow-ups on file.     Subjective       Patient's complete Health Risk Assessment and screening values have been reviewed and are found in Flowsheets. The following problems were reviewed today and where indicated follow up appointments were made and/or referrals ordered.    Positive Risk Factor Screenings with Interventions:       Cognitive:   Clock Drawing Test (CDT): Normal  Words recalled: 2 Words Recalled     Total Score Interpretation: Abnormal Mini-Cog  Interventions:  See AVS for additional education material            Activity, Diet, and Weight:  On average, how many days per week do you engage in moderate to strenuous exercise (like a brisk walk)?: 0 days  On average, how many minutes do you engage in exercise at this level?: 0 min    Do you eat balanced/healthy meals regularly?: Yes    Body mass index is 25.66 kg/m².      Inactivity Interventions:  See AVS for additional education material        Dentist Screen:  Have you seen the dentist within the past year?: (!) No    Intervention:  See AVS for additional education material    Hearing Screen:  Do you or your family notice any trouble with your hearing that hasn't been managed with hearing aids?: (!) Yes    Interventions:  See AVS for additional education material       Advanced Directives:  Do you have a Living Will?: (!) No    Intervention:  has NO advanced directive - not interested in additional information                     Objective   Vitals:    05/07/24 0752

## 2024-05-21 NOTE — PROGRESS NOTES
Name:  Ihsan Domingo  YOB: 1950   MRN: [id number]      Office Visit: 5/22/2024        ASSESSMENT AND PLAN:  (Medical Decision Making)    Impression: 74 y.o. male     1. Pulmonary emphysema, unspecified emphysema type (HCC)  --spirometry is stable.  Continue Incruse daily and prn albuterol.  - albuterol sulfate HFA (VENTOLIN HFA) 108 (90 Base) MCG/ACT inhaler; Inhale 2 puffs into the lungs every 4 hours as needed for Wheezing or Shortness of Breath Dx code j44.9  Dispense: 3 each; Refill: 3  - omeprazole (PRILOSEC) 40 MG delayed release capsule; Take 1 capsule by mouth 2 times daily  Dispense: 180 capsule; Refill: 3  - umeclidinium bromide (INCRUSE ELLIPTA) 62.5 MCG/ACT inhaler; Inhale 1 puff into the lungs daily Dx code j44.9  Dispense: 3 each; Refill: 3  - Spirometry Without Bronchodilator    2. Obstructive sleep apnea  --remains on CPAP--[facility]    3. Gastroesophageal reflux disease without esophagitis  --controlled on PPI.    Orders Placed This Encounter   Medications    albuterol sulfate HFA (VENTOLIN HFA) 108 (90 Base) MCG/ACT inhaler     Sig: Inhale 2 puffs into the lungs every 4 hours as needed for Wheezing or Shortness of Breath Dx code j44.9     Dispense:  3 each     Refill:  3    omeprazole (PRILOSEC) 40 MG delayed release capsule     Sig: Take 1 capsule by mouth 2 times daily     Dispense:  180 capsule     Refill:  3    umeclidinium bromide (INCRUSE ELLIPTA) 62.5 MCG/ACT inhaler     Sig: Inhale 1 puff into the lungs daily Dx code j44.9     Dispense:  3 each     Refill:  3     No orders of the defined types were placed in this encounter.    Follow-up and Dispositions    Return in about 1 year (around 5/22/2025) for Queenie munoz MD, emphysema, spirometry, Arrive 15 minutes prior to appt time.     Collaborating physician is Dr. Magdiel Diggs.    ADS    Queenie Nick, APRN - CNP    ____    HISTORY OF PRESENT ILLNESS:    Mr. Ihsan Domingo is a 74 y.o. male who is seen at [place]

## 2024-05-22 ENCOUNTER — OFFICE VISIT (OUTPATIENT)
Dept: PULMONOLOGY | Age: 74
End: 2024-05-22
Payer: OTHER GOVERNMENT

## 2024-05-22 VITALS
TEMPERATURE: 98.2 F | WEIGHT: 156.4 LBS | HEIGHT: 66 IN | SYSTOLIC BLOOD PRESSURE: 114 MMHG | OXYGEN SATURATION: 96 % | DIASTOLIC BLOOD PRESSURE: 68 MMHG | RESPIRATION RATE: 18 BRPM | HEART RATE: 80 BPM | BODY MASS INDEX: 25.13 KG/M2

## 2024-05-22 DIAGNOSIS — K21.9 GASTROESOPHAGEAL REFLUX DISEASE WITHOUT ESOPHAGITIS: ICD-10-CM

## 2024-05-22 DIAGNOSIS — J43.9 PULMONARY EMPHYSEMA, UNSPECIFIED EMPHYSEMA TYPE (HCC): Primary | ICD-10-CM

## 2024-05-22 DIAGNOSIS — G47.33 OBSTRUCTIVE SLEEP APNEA: ICD-10-CM

## 2024-05-22 LAB
EXPIRATORY TIME: NORMAL
FEF 25-75% %PRED-PRE: NORMAL
FEF 25-75% PRED: NORMAL
FEF 25-75-PRE: NORMAL
FEV1 %PRED-PRE: 84 %
FEV1 PRED: 2.66 L
FEV1/FVC %PRED-PRE: NORMAL
FEV1/FVC PRED: NORMAL
FEV1/FVC: 82 %
FEV1: 2.25 L
FVC %PRED-PRE: 76 %
FVC PRED: 3.52 L
FVC: 2.75 L
PEF %PRED-PRE: NORMAL
PEF PRED: NORMAL
PEF-PRE: NORMAL

## 2024-05-22 PROCEDURE — 1123F ACP DISCUSS/DSCN MKR DOCD: CPT | Performed by: NURSE PRACTITIONER

## 2024-05-22 PROCEDURE — 99214 OFFICE O/P EST MOD 30 MIN: CPT | Performed by: NURSE PRACTITIONER

## 2024-05-22 PROCEDURE — 3074F SYST BP LT 130 MM HG: CPT | Performed by: NURSE PRACTITIONER

## 2024-05-22 PROCEDURE — 3078F DIAST BP <80 MM HG: CPT | Performed by: NURSE PRACTITIONER

## 2024-05-22 RX ORDER — ALBUTEROL SULFATE 90 UG/1
2 AEROSOL, METERED RESPIRATORY (INHALATION) EVERY 4 HOURS PRN
Qty: 3 EACH | Refills: 3 | Status: SHIPPED | OUTPATIENT
Start: 2024-05-22

## 2024-05-22 RX ORDER — OMEPRAZOLE 40 MG/1
40 CAPSULE, DELAYED RELEASE ORAL 2 TIMES DAILY
Qty: 180 CAPSULE | Refills: 3 | Status: SHIPPED | OUTPATIENT
Start: 2024-05-22

## 2024-05-22 ASSESSMENT — PULMONARY FUNCTION TESTS
FVC: 2.75
FEV1: 2.25
FVC_PERCENT_PREDICTED_PRE: 76
FEV1_PERCENT_PREDICTED_PRE: 84
FEV1_PREDICTED: 2.66
FVC_PREDICTED: 3.52
FEV1/FVC: 82

## 2024-11-07 ENCOUNTER — OFFICE VISIT (OUTPATIENT)
Dept: FAMILY MEDICINE CLINIC | Facility: CLINIC | Age: 74
End: 2024-11-07

## 2024-11-07 VITALS
SYSTOLIC BLOOD PRESSURE: 112 MMHG | HEIGHT: 66 IN | DIASTOLIC BLOOD PRESSURE: 62 MMHG | BODY MASS INDEX: 24.53 KG/M2 | WEIGHT: 152.6 LBS | HEART RATE: 68 BPM | TEMPERATURE: 98.3 F | OXYGEN SATURATION: 97 %

## 2024-11-07 DIAGNOSIS — E11.9 ENCOUNTER FOR DIABETIC FOOT EXAM (HCC): ICD-10-CM

## 2024-11-07 DIAGNOSIS — D50.0 IRON DEFICIENCY ANEMIA DUE TO CHRONIC BLOOD LOSS: ICD-10-CM

## 2024-11-07 DIAGNOSIS — E11.42 CONTROLLED TYPE 2 DIABETES MELLITUS WITH DIABETIC POLYNEUROPATHY, WITH LONG-TERM CURRENT USE OF INSULIN (HCC): ICD-10-CM

## 2024-11-07 DIAGNOSIS — I25.10 CORONARY ARTERY DISEASE DUE TO LIPID RICH PLAQUE: ICD-10-CM

## 2024-11-07 DIAGNOSIS — I10 ESSENTIAL HYPERTENSION: ICD-10-CM

## 2024-11-07 DIAGNOSIS — E11.42 CONTROLLED TYPE 2 DIABETES MELLITUS WITH DIABETIC POLYNEUROPATHY, WITH LONG-TERM CURRENT USE OF INSULIN (HCC): Primary | ICD-10-CM

## 2024-11-07 DIAGNOSIS — Z79.4 CONTROLLED TYPE 2 DIABETES MELLITUS WITH DIABETIC POLYNEUROPATHY, WITH LONG-TERM CURRENT USE OF INSULIN (HCC): Primary | ICD-10-CM

## 2024-11-07 DIAGNOSIS — J44.9 CHRONIC OBSTRUCTIVE PULMONARY DISEASE, UNSPECIFIED COPD TYPE (HCC): ICD-10-CM

## 2024-11-07 DIAGNOSIS — I25.83 CORONARY ARTERY DISEASE DUE TO LIPID RICH PLAQUE: ICD-10-CM

## 2024-11-07 DIAGNOSIS — K74.5 BILIARY CIRRHOSIS (HCC): ICD-10-CM

## 2024-11-07 DIAGNOSIS — Z79.4 CONTROLLED TYPE 2 DIABETES MELLITUS WITH DIABETIC POLYNEUROPATHY, WITH LONG-TERM CURRENT USE OF INSULIN (HCC): ICD-10-CM

## 2024-11-07 LAB
ALBUMIN SERPL-MCNC: 3.4 G/DL (ref 3.2–4.6)
ALBUMIN/GLOB SERPL: 1 (ref 1–1.9)
ALP SERPL-CCNC: 65 U/L (ref 40–129)
ALT SERPL-CCNC: 18 U/L (ref 8–55)
ANION GAP SERPL CALC-SCNC: 10 MMOL/L (ref 7–16)
AST SERPL-CCNC: 28 U/L (ref 15–37)
BASOPHILS # BLD: 0.1 K/UL (ref 0–0.2)
BASOPHILS NFR BLD: 2 % (ref 0–2)
BILIRUB SERPL-MCNC: 0.4 MG/DL (ref 0–1.2)
BUN SERPL-MCNC: 11 MG/DL (ref 8–23)
CALCIUM SERPL-MCNC: 9.5 MG/DL (ref 8.8–10.2)
CHLORIDE SERPL-SCNC: 106 MMOL/L (ref 98–107)
CHOLEST SERPL-MCNC: 103 MG/DL (ref 0–200)
CO2 SERPL-SCNC: 27 MMOL/L (ref 20–29)
CREAT SERPL-MCNC: 0.79 MG/DL (ref 0.8–1.3)
CREAT UR-MCNC: 92.6 MG/DL (ref 39–259)
DIFFERENTIAL METHOD BLD: ABNORMAL
EOSINOPHIL # BLD: 0.2 K/UL (ref 0–0.8)
EOSINOPHIL NFR BLD: 4 % (ref 0.5–7.8)
ERYTHROCYTE [DISTWIDTH] IN BLOOD BY AUTOMATED COUNT: 16.2 % (ref 11.9–14.6)
FERRITIN SERPL-MCNC: 67 NG/ML (ref 8–388)
GLOBULIN SER CALC-MCNC: 3.5 G/DL (ref 2.3–3.5)
GLUCOSE SERPL-MCNC: 185 MG/DL (ref 70–99)
HCT VFR BLD AUTO: 41.1 % (ref 41.1–50.3)
HDLC SERPL-MCNC: 43 MG/DL (ref 40–60)
HDLC SERPL: 2.4 (ref 0–5)
HGB BLD-MCNC: 13.1 G/DL (ref 13.6–17.2)
IMM GRANULOCYTES # BLD AUTO: 0 K/UL (ref 0–0.5)
IMM GRANULOCYTES NFR BLD AUTO: 0 % (ref 0–5)
IRON SATN MFR SERPL: 13 % (ref 20–50)
IRON SERPL-MCNC: 43 UG/DL (ref 35–100)
LDLC SERPL CALC-MCNC: 33 MG/DL (ref 0–100)
LYMPHOCYTES # BLD: 0.4 K/UL (ref 0.5–4.6)
LYMPHOCYTES NFR BLD: 10 % (ref 13–44)
MCH RBC QN AUTO: 29.2 PG (ref 26.1–32.9)
MCHC RBC AUTO-ENTMCNC: 31.9 G/DL (ref 31.4–35)
MCV RBC AUTO: 91.7 FL (ref 82–102)
MICROALBUMIN UR-MCNC: <1.2 MG/DL (ref 0–20)
MICROALBUMIN/CREAT UR-RTO: NORMAL MG/G (ref 0–30)
MONOCYTES # BLD: 0.5 K/UL (ref 0.1–1.3)
MONOCYTES NFR BLD: 13 % (ref 4–12)
NEUTS SEG # BLD: 2.8 K/UL (ref 1.7–8.2)
NEUTS SEG NFR BLD: 72 % (ref 43–78)
NRBC # BLD: 0 K/UL (ref 0–0.2)
PLATELET # BLD AUTO: 84 K/UL (ref 150–450)
PMV BLD AUTO: 12.5 FL (ref 9.4–12.3)
POTASSIUM SERPL-SCNC: 4.1 MMOL/L (ref 3.5–5.1)
PROT SERPL-MCNC: 6.9 G/DL (ref 6.3–8.2)
RBC # BLD AUTO: 4.48 M/UL (ref 4.23–5.6)
SODIUM SERPL-SCNC: 142 MMOL/L (ref 136–145)
TIBC SERPL-MCNC: 340 UG/DL (ref 240–450)
TRIGL SERPL-MCNC: 137 MG/DL (ref 0–150)
TSH W FREE THYROID IF ABNORMAL: 1.72 UIU/ML (ref 0.27–4.2)
UIBC SERPL-MCNC: 297 UG/DL (ref 112–347)
VLDLC SERPL CALC-MCNC: 27 MG/DL (ref 6–23)
WBC # BLD AUTO: 3.9 K/UL (ref 4.3–11.1)

## 2024-11-07 RX ORDER — ASPIRIN 81 MG/1
81 TABLET, CHEWABLE ORAL
COMMUNITY
Start: 2024-08-16

## 2024-11-07 ASSESSMENT — PATIENT HEALTH QUESTIONNAIRE - PHQ9
SUM OF ALL RESPONSES TO PHQ QUESTIONS 1-9: 0
SUM OF ALL RESPONSES TO PHQ QUESTIONS 1-9: 0
1. LITTLE INTEREST OR PLEASURE IN DOING THINGS: NOT AT ALL
SUM OF ALL RESPONSES TO PHQ QUESTIONS 1-9: 0
2. FEELING DOWN, DEPRESSED OR HOPELESS: NOT AT ALL
SUM OF ALL RESPONSES TO PHQ QUESTIONS 1-9: 0
SUM OF ALL RESPONSES TO PHQ9 QUESTIONS 1 & 2: 0

## 2024-11-07 ASSESSMENT — ENCOUNTER SYMPTOMS
WHEEZING: 0
DIARRHEA: 0
CHEST TIGHTNESS: 0
CONSTIPATION: 0
SHORTNESS OF BREATH: 0

## 2024-11-07 NOTE — ASSESSMENT & PLAN NOTE
Established with pulmonary.  Recent weight loss.  Consider checking lung CT.  No longer meets criteria for lung cancer screening.

## 2024-11-07 NOTE — ASSESSMENT & PLAN NOTE
Established with cardiology.  No concerns today.  Denies worsening chest pain or shortness of breath.

## 2024-11-07 NOTE — ASSESSMENT & PLAN NOTE
[facility].  Complicated by varices. Last liver ultrasound showed no acute changes.

## 2024-11-07 NOTE — PROGRESS NOTES
results within one week.  If you have not heard regarding results in over a week, please contact office.  You can also review results on [employer].           Mello Sanchez MD

## 2024-11-11 DIAGNOSIS — Z79.4 CONTROLLED TYPE 2 DIABETES MELLITUS WITH DIABETIC POLYNEUROPATHY, WITH LONG-TERM CURRENT USE OF INSULIN (HCC): ICD-10-CM

## 2024-11-11 DIAGNOSIS — E11.42 CONTROLLED TYPE 2 DIABETES MELLITUS WITH DIABETIC POLYNEUROPATHY, WITH LONG-TERM CURRENT USE OF INSULIN (HCC): ICD-10-CM

## 2024-11-11 LAB
EST. AVERAGE GLUCOSE BLD GHB EST-MCNC: 154 MG/DL
HBA1C MFR BLD: 7 % (ref 0–5.6)

## 2024-11-12 ENCOUNTER — OFFICE VISIT (OUTPATIENT)
Age: 74
End: 2024-11-12

## 2024-11-12 VITALS
BODY MASS INDEX: 24.59 KG/M2 | HEART RATE: 72 BPM | WEIGHT: 153 LBS | SYSTOLIC BLOOD PRESSURE: 96 MMHG | HEIGHT: 66 IN | DIASTOLIC BLOOD PRESSURE: 64 MMHG

## 2024-11-12 DIAGNOSIS — E08.65 DIABETES MELLITUS DUE TO UNDERLYING CONDITION WITH HYPERGLYCEMIA, WITH LONG-TERM CURRENT USE OF INSULIN (HCC): Primary | ICD-10-CM

## 2024-11-12 DIAGNOSIS — I77.9 CAROTID ARTERY DISEASE, UNSPECIFIED LATERALITY (HCC): ICD-10-CM

## 2024-11-12 DIAGNOSIS — I10 ESSENTIAL HYPERTENSION: ICD-10-CM

## 2024-11-12 DIAGNOSIS — E78.5 HYPERLIPIDEMIA, UNSPECIFIED HYPERLIPIDEMIA TYPE: ICD-10-CM

## 2024-11-12 DIAGNOSIS — Z79.4 DIABETES MELLITUS DUE TO UNDERLYING CONDITION WITH HYPERGLYCEMIA, WITH LONG-TERM CURRENT USE OF INSULIN (HCC): Primary | ICD-10-CM

## 2024-11-12 DIAGNOSIS — I25.10 ASCVD (ARTERIOSCLEROTIC CARDIOVASCULAR DISEASE): ICD-10-CM

## 2024-11-12 NOTE — PROGRESS NOTES
[facility], [address], [address]  [place] [address]  PHONE: [phone]    SUBJECTIVE:   Ihsan Domingo is a 74 y.o. male 1950   seen for a follow up visit regarding the following:     Chief Complaint   Patient presents with    Coronary Artery Disease    Follow-up         History of Present Illness  The patient is a 74-year-old male who presents for evaluation of multiple medical concerns. He is accompanied by his wife.    He reports feeling well overall but experiences some bone soreness. He does not experience any chest pain or pressure. He does have shortness of breath while walking, which has not changed since his last appointment.    His wife mentions that he has undergone several upper endoscopies due to liver cirrhosis and the presence of two clots in his liver. He has been advised to avoid NSAIDs and continue with nadolol. He also had some varices banded. Recent blood work done by Dr. Sanchez showed excellent results.    He has been seeing a nurse practitioner in pulmonary for the past 2 to 3 years due to emphysema, likely a result of his smoking history. His last appointment was in 04/2024. He uses inhalers for his emphysema and a CPAP machine for obstructive sleep apnea.    His current medications include albuterol, vitamin C, aspirin 81 mg daily, Lipitor 10 mg daily, Jardiance, iron, gabapentin 100 mg, long-acting insulin glargine, NovoLog 12 units with meals, Carafate 1 g twice daily as needed, and Flomax. He does not take extra insulin when his blood sugar is higher than normal before meals, maintaining a consistent dose of 12 units each time. His wife notes that his platelets were in the low 70s at his last check.        Interval history:   Cath done 2020 due to sx  simliar to last sent  HGb stable.          catheterization was in 1999.  Patient presented to review stress test results.      He was originally seen in 2016.  He has a previous history of stenting

## 2025-05-21 ENCOUNTER — OFFICE VISIT (OUTPATIENT)
Dept: PULMONOLOGY | Age: 75
End: 2025-05-21
Payer: OTHER GOVERNMENT

## 2025-05-21 ENCOUNTER — HOSPITAL ENCOUNTER (OUTPATIENT)
Dept: GENERAL RADIOLOGY | Age: 75
Discharge: HOME | End: 2025-05-23
Payer: OTHER GOVERNMENT

## 2025-05-21 VITALS
BODY MASS INDEX: 24.72 KG/M2 | OXYGEN SATURATION: 96 % | HEART RATE: 63 BPM | DIASTOLIC BLOOD PRESSURE: 66 MMHG | SYSTOLIC BLOOD PRESSURE: 118 MMHG | WEIGHT: 153.8 LBS | RESPIRATION RATE: 18 BRPM | TEMPERATURE: 97.3 F | HEIGHT: 66 IN

## 2025-05-21 DIAGNOSIS — R94.2 DECREASED DIFFUSION CAPACITY: ICD-10-CM

## 2025-05-21 DIAGNOSIS — R05.3 CHRONIC COUGH: ICD-10-CM

## 2025-05-21 DIAGNOSIS — J43.9 PULMONARY EMPHYSEMA, UNSPECIFIED EMPHYSEMA TYPE (HCC): Primary | ICD-10-CM

## 2025-05-21 DIAGNOSIS — G47.33 OBSTRUCTIVE SLEEP APNEA: ICD-10-CM

## 2025-05-21 PROCEDURE — 3078F DIAST BP <80 MM HG: CPT | Performed by: STUDENT IN AN ORGANIZED HEALTH CARE EDUCATION/TRAINING PROGRAM

## 2025-05-21 PROCEDURE — 99214 OFFICE O/P EST MOD 30 MIN: CPT | Performed by: STUDENT IN AN ORGANIZED HEALTH CARE EDUCATION/TRAINING PROGRAM

## 2025-05-21 PROCEDURE — G2211 COMPLEX E/M VISIT ADD ON: HCPCS | Performed by: STUDENT IN AN ORGANIZED HEALTH CARE EDUCATION/TRAINING PROGRAM

## 2025-05-21 PROCEDURE — 1123F ACP DISCUSS/DSCN MKR DOCD: CPT | Performed by: STUDENT IN AN ORGANIZED HEALTH CARE EDUCATION/TRAINING PROGRAM

## 2025-05-21 PROCEDURE — 71046 X-RAY EXAM CHEST 2 VIEWS: CPT

## 2025-05-21 PROCEDURE — 3074F SYST BP LT 130 MM HG: CPT | Performed by: STUDENT IN AN ORGANIZED HEALTH CARE EDUCATION/TRAINING PROGRAM

## 2025-05-21 RX ORDER — FUROSEMIDE 20 MG/1
20 TABLET ORAL DAILY
COMMUNITY
Start: 2025-02-26

## 2025-05-21 RX ORDER — ALBUTEROL SULFATE 90 UG/1
2 INHALANT RESPIRATORY (INHALATION) EVERY 4 HOURS PRN
Qty: 3 EACH | Refills: 3 | Status: SHIPPED | OUTPATIENT
Start: 2025-05-21

## 2025-05-21 NOTE — PROGRESS NOTES
Name:  Ihsan Domingo  YOB: 1950   MRN: [id number]      Office Visit: 5/21/2025        ASSESSMENT AND PLAN:  (Medical Decision Making)    Impression: 75 y.o. male w/ a hx of cirrhosis w/ esophageal varices on lasix followed by GI, JERILYN on CPAP, HTN, HLD, GERD, T2DM, chonic back pain, CAD s/p MI s/p NASIMA in 1999, BPH, former smoking (60 pack/yrs, quit 1997), isolated mild reduction in DLCO felt to represent mild COPD/emphysema (FEV1 normal range all pfts). Here to follow up on his pulmonary issues.     1. Pulmonary emphysema, unspecified emphysema type (HCC)   2. Decreased diffusion capacity  No obstruction on prior pfts, but some isolated reduction in diffusion capacity - he specifically was not anemic at the time of the 2020 cPFT testing. Cannot tell a difference when taking inhalers, isn't sure albuterol helps. He has some exercise limitation due to SOB, but isn't interested in pulmonary rehab.    - Trial off of incruse to see if pt can tell a difference   - Cont albuterol prn     3. Chronic cough - Present for the past few years. Unclear etiology. On a PPI for GERD, on gabapentin for neuropathic pain already as well. 2020 CT Chest has some scattered micronodules that are calcified and have a benign appearance. Quit smoking many years ago.   - XR CHEST PA LAT (2 VIEWS); Future    4. Obstructive sleep apnea- on CPAP, reports good compliance.    - remains on CPAP--[facility]    Return in about 1 year (around 5/21/2026) for cough and reduced DLCO, with Queenie or Kevin.    Darwin Brown MD  ____    HISTORY OF PRESENT ILLNESS:    Mr. Ihsan Domingo is a 75 y.o. male w/ a hx of cirrhosis w/ esophageal varices on lasix followed by GI, JERILYN on CPAP, HTN, HLD, GERD, T2DM, chonic back pain, CAD s/p MI s/p NASIMA in 1999, BPH, former smoking (60 pack/yrs, quit 1997), isolated mild reduction in DLCO felt to represent mild COPD/emphysema (FEV1 normal range all pfts). Here to follow up on

## 2025-05-23 ENCOUNTER — RESULTS FOLLOW-UP (OUTPATIENT)
Dept: PULMONOLOGY | Age: 75
End: 2025-05-23

## 2025-07-31 ENCOUNTER — TELEPHONE (OUTPATIENT)
Dept: PULMONOLOGY | Age: 75
End: 2025-07-31

## (undated) DEVICE — BIPOLAR ELECTROHEMOSTASIS CATHETER: Brand: GOLD PROBE

## (undated) DEVICE — SYRINGE MED 3ML CLR PLAS STD N CTRL LUERLOCK TIP DISP

## (undated) DEVICE — CONNECTOR TBNG OD5-7MM O2 END DISP

## (undated) DEVICE — NEEDLE INJ 25GA P5MM SHFT L230CM SHTH DIA2.5MM S STL TEF

## (undated) DEVICE — MULTIPLE BAND LIGATOR: Brand: SPEEDBAND SUPERVIEW SUPER 7

## (undated) DEVICE — CANNULA NSL ORAL AD FOR CAPNOFLEX CO2 O2 AIRLFE

## (undated) DEVICE — CONTAINER PREFIL FRMLN 40ML --

## (undated) DEVICE — FORCEPS BX L240CM JAW DIA2.8MM L CAP W/ NDL MIC MESH TOOTH

## (undated) DEVICE — REM POLYHESIVE ADULT PATIENT RETURN ELECTRODE: Brand: VALLEYLAB

## (undated) DEVICE — SNARE POLYP SM W13MMXL240CM SHTH DIA2.4MM OVL FLX DISP

## (undated) DEVICE — KENDALL RADIOLUCENT FOAM MONITORING ELECTRODE RECTANGULAR SHAPE: Brand: KENDALL

## (undated) DEVICE — BLOCK BITE AD 60FR W/ VELC STRP ADDRESSES MOST PT AND

## (undated) DEVICE — NEEDLE SYR 18GA L1.5IN RED PLAS HUB S STL BLNT FILL W/O

## (undated) DEVICE — CLIP MED L235CM L2.8MM 11MM OPN HEMSTAT FIX RESOL

## (undated) DEVICE — SYR 5ML 1/5 GRAD LL NSAF LF --

## (undated) DEVICE — SYRINGE MED 5ML STD CLR PLAS LUERLOCK TIP N CTRL DISP

## (undated) DEVICE — CLIP INT L235CM WRK CHAN DIA2.8MM OPN 11MM LCK MECHANISM MR

## (undated) DEVICE — CONTAINER FORMALIN PFILLED 10% NBF 40ML

## (undated) DEVICE — NDL PRT INJ NSAF BLNT 18GX1.5 --

## (undated) DEVICE — CLIPPING DEVICE: Brand: RESOLUTION CLIP

## (undated) DEVICE — GAUZE,SPONGE,4"X4",12PLY,WOVEN,NS,LF: Brand: MEDLINE

## (undated) DEVICE — SYR 3ML LL TIP 1/10ML GRAD --